# Patient Record
Sex: MALE | Race: WHITE | NOT HISPANIC OR LATINO | Employment: FULL TIME | URBAN - METROPOLITAN AREA
[De-identification: names, ages, dates, MRNs, and addresses within clinical notes are randomized per-mention and may not be internally consistent; named-entity substitution may affect disease eponyms.]

---

## 2021-05-21 ENCOUNTER — OFFICE VISIT (OUTPATIENT)
Dept: URGENT CARE | Facility: CLINIC | Age: 56
End: 2021-05-21
Payer: COMMERCIAL

## 2021-05-21 VITALS
TEMPERATURE: 99 F | BODY MASS INDEX: 24.34 KG/M2 | HEART RATE: 73 BPM | HEIGHT: 70 IN | WEIGHT: 170 LBS | OXYGEN SATURATION: 98 % | RESPIRATION RATE: 18 BRPM

## 2021-05-21 DIAGNOSIS — H18.822 CORNEAL ABRASION OF LEFT EYE DUE TO CONTACT LENS: Primary | ICD-10-CM

## 2021-05-21 PROCEDURE — 99203 OFFICE O/P NEW LOW 30 MIN: CPT | Performed by: NURSE PRACTITIONER

## 2021-05-21 RX ORDER — TOBRAMYCIN AND DEXAMETHASONE 3; 1 MG/ML; MG/ML
1 SUSPENSION/ DROPS OPHTHALMIC
Qty: 5 ML | Refills: 0 | Status: SHIPPED | OUTPATIENT
Start: 2021-05-21 | End: 2021-08-31 | Stop reason: HOSPADM

## 2021-05-21 RX ORDER — METOPROLOL SUCCINATE 50 MG/1
1 TABLET, EXTENDED RELEASE ORAL DAILY
COMMUNITY
Start: 2014-03-10 | End: 2021-08-31 | Stop reason: HOSPADM

## 2021-05-21 RX ORDER — LISINOPRIL 40 MG/1
1 TABLET ORAL DAILY
COMMUNITY
Start: 2013-05-14 | End: 2021-08-31 | Stop reason: HOSPADM

## 2021-05-21 RX ORDER — HYDROCHLOROTHIAZIDE 25 MG/1
1 TABLET ORAL DAILY
COMMUNITY
Start: 2013-05-13 | End: 2021-08-31 | Stop reason: HOSPADM

## 2021-05-21 RX ORDER — TOBRAMYCIN AND DEXAMETHASONE 3; 1 MG/ML; MG/ML
1 SUSPENSION/ DROPS OPHTHALMIC
Qty: 5 ML | Refills: 0 | Status: SHIPPED | OUTPATIENT
Start: 2021-05-21 | End: 2021-05-21

## 2021-05-21 NOTE — PATIENT INSTRUCTIONS
Use the eye drops as ordered for 7-10 days  Keep your contacts out at least the first 7 days and until your eye feels better  If the discomfort is not resolved, you should follow-up with your eye doctor  Corneal Abrasion   AMBULATORY CARE:   A corneal abrasion  is a scratch on the cornea of your eye  The cornea is the clear layer that covers the front of your eye  A small scratch may heal in 1 to 2 days  Deeper or larger scratches may take longer to heal  Do not touch or rub your eye while it is healing  Common signs and symptoms:   · Pain    · More tears than usual    · Redness    · A feeling that you have something in your eye    · Blurred vision    · Sensitivity to bright light    · Headache    Call your healthcare provider or ophthalmologist if:   · Your eye pain or vision gets worse  · You have yellow or green drainage from your eye  · You have questions or concerns about your condition or care  Treatment for a corneal abrasion  may include antibiotic eyedrops or ointment to help prevent an eye infection  You may also be given eyedrops to decrease pain  Care for your eyes:   · Get regular eye exams  Get your eyes checked at least every year  · Eat healthy foods  Fresh fruits and vegetables that are rich in vitamins A and C may help with your vision  Foods such as sweet potatoes, apricots, and carrots are rich in good nutrients for the eyes  · Take care of your contacts or glasses  Store, clean, and use your contacts or glasses as directed  Replace your glasses or contact lenses as often as your healthcare provider suggests  · Decrease eye strain  Rest your eyes, especially after you read or sew for long periods of time  Get plenty of sleep at night  Use lights that reduce glare in your home, school, or workplace  · Wear dark sunglasses  This will help prevent pain and light sensitivity  Make sure the sunglasses have UVA and UVB protection   This will protect your eyes when you go outside  · Use eyedrops safely  If your treatment plan includes eyedrops, it is important to use them as directed  Your provider may give you detailed instructions to follow  The eyedrops may also come with safety instructions  Follow all instructions to help prevent an infection  Do not touch the tip of the bottle to your eye  Germs from your eye can spread to the medicine bottle  Prevent corneal abrasions:   · Remove your contact lenses if your eyes feel dry or irritated  · Wash your hands if you need to touch your eyes or your face  · Trim your child's fingernails so he or she cannot scratch his or her eye  · Wear protective eyewear when you work with chemicals, wood, dust, or metal     · Wear protective eyewear when you play sports  · Do not wear your contacts for longer than you should  · Do not wear colored lenses or lenses with shapes on them  These lenses may cause eye damage and vision loss  · Do not wear glitter makeup  Glitter can easily get into your eyes and under contact lenses  · Do not sleep with your contacts in  Follow up with your healthcare provider as directed:  Write down your questions so you remember to ask them during your visits  © Copyright [facility] [address] Information is for End User's use only and may not be sold, redistributed or otherwise used for commercial purposes  All illustrations and images included in CareNotes® are the copyrighted property of [employer]  or [employer]  The above information is an  only  It is not intended as medical advice for individual conditions or treatments  Talk to your doctor, nurse or pharmacist before following any medical regimen to see if it is safe and effective for you

## 2021-05-23 NOTE — PROGRESS NOTES
[facility]        NAME: Shana Sebastian is a 54 y o  male  : 1965    MRN: [id number]  DATE: May 23, 2021  TIME: 3:40 PM      Assessment and Plan     Corneal abrasion of left eye due to contact lens [H18 822]  1  Corneal abrasion of left eye due to contact lens  tobramycin-dexamethasone (TOBRADEX) ophthalmic suspension    DISCONTINUED: tobramycin-dexamethasone (TOBRADEX) ophthalmic suspension         Patient Instructions     Patient Instructions     Use the eye drops as ordered for 7-10 days  Keep your contacts out at least the first 7 days and until your eye feels better  If the discomfort is not resolved, you should follow-up with your eye doctor  Corneal Abrasion   AMBULATORY CARE:   A corneal abrasion  is a scratch on the cornea of your eye  The cornea is the clear layer that covers the front of your eye  A small scratch may heal in 1 to 2 days  Deeper or larger scratches may take longer to heal  Do not touch or rub your eye while it is healing  Common signs and symptoms:   · Pain    · More tears than usual    · Redness    · A feeling that you have something in your eye    · Blurred vision    · Sensitivity to bright light    · Headache    Call your healthcare provider or ophthalmologist if:   · Your eye pain or vision gets worse  · You have yellow or green drainage from your eye  · You have questions or concerns about your condition or care  Treatment for a corneal abrasion  may include antibiotic eyedrops or ointment to help prevent an eye infection  You may also be given eyedrops to decrease pain  Care for your eyes:   · Get regular eye exams  Get your eyes checked at least every year  · Eat healthy foods  Fresh fruits and vegetables that are rich in vitamins A and C may help with your vision  Foods such as sweet potatoes, apricots, and carrots are rich in good nutrients for the eyes  · Take care of your contacts or glasses    Store, clean, and use your contacts or glasses as directed  Replace your glasses or contact lenses as often as your healthcare provider suggests  · Decrease eye strain  Rest your eyes, especially after you read or sew for long periods of time  Get plenty of sleep at night  Use lights that reduce glare in your home, school, or workplace  · Wear dark sunglasses  This will help prevent pain and light sensitivity  Make sure the sunglasses have UVA and UVB protection  This will protect your eyes when you go outside  · Use eyedrops safely  If your treatment plan includes eyedrops, it is important to use them as directed  Your provider may give you detailed instructions to follow  The eyedrops may also come with safety instructions  Follow all instructions to help prevent an infection  Do not touch the tip of the bottle to your eye  Germs from your eye can spread to the medicine bottle  Prevent corneal abrasions:   · Remove your contact lenses if your eyes feel dry or irritated  · Wash your hands if you need to touch your eyes or your face  · Trim your child's fingernails so he or she cannot scratch his or her eye  · Wear protective eyewear when you work with chemicals, wood, dust, or metal     · Wear protective eyewear when you play sports  · Do not wear your contacts for longer than you should  · Do not wear colored lenses or lenses with shapes on them  These lenses may cause eye damage and vision loss  · Do not wear glitter makeup  Glitter can easily get into your eyes and under contact lenses  · Do not sleep with your contacts in  Follow up with your healthcare provider as directed:  Write down your questions so you remember to ask them during your visits  © Copyright [address] Information is for End User's use only and may not be sold, redistributed or otherwise used for commercial purposes   All illustrations and images included in CareNotes® are the copyrighted property of [employer]  or [employer]  The above information is an  only  It is not intended as medical advice for individual conditions or treatments  Talk to your doctor, nurse or pharmacist before following any medical regimen to see if it is safe and effective for you  Follow up with PCP in 3-5 days  Proceed to  ER if symptoms worsen  Chief Complaint     Chief Complaint   Patient presents with    Eye Problem     left eye painful and swollen for 2 days, difficulty getting contact out of eye         History of Present Illness     Patient reports left eye discomfort, pain, watering, trouble removing contact lenses x 2 days  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  Nothing got in his eye that he knows of  Patient declines BP and has not taken his BP meds in several years  He refuses BP for nurse and myself  Does not have a current PCP and declines list of area PCPs  Risks of untreated hypertension discussed in depth with patient  While wearing contacts, left eye 20/25, right eye 20/25, both eyes 20/25  Review of Systems     Review of Systems   Eyes: Positive for pain and discharge (watering)  Negative for photophobia, redness, itching and visual disturbance  All other systems reviewed and are negative          Current Medications       Current Outpatient Medications:     hydrochlorothiazide (HYDRODIURIL) 25 mg tablet, Take 1 tablet by mouth daily, Disp: , Rfl:     lisinopril (ZESTRIL) 40 mg tablet, Take 1 tablet by mouth daily, Disp: , Rfl:     metoprolol succinate (TOPROL-XL) 50 mg 24 hr tablet, Take 1 tablet by mouth daily, Disp: , Rfl:     tobramycin-dexamethasone (TOBRADEX) ophthalmic suspension, Administer 1 drop into the left eye every 4 (four) hours while awake for 7 days, Disp: 5 mL, Rfl: 0    Current Allergies     Allergies as of 05/21/2021 - Reviewed 05/21/2021   Allergen Reaction Noted    Penicillins Other (See Comments) 02/20/2013              The following portions of the patient's history were reviewed and updated as appropriate: allergies, current medications, past family history, past medical history, past social history, past surgical history and problem list      History reviewed  No pertinent past medical history  History reviewed  No pertinent surgical history  History reviewed  No pertinent family history  Medications have been verified  Objective     Pulse 73   Temp 99 °F (37 2 °C) (Tympanic)   Resp 18   Ht 5' 10" (1 778 m)   Wt 77 1 kg (170 lb)   SpO2 98%   BMI 24 39 kg/m²   No LMP for male patient  Physical Exam     Physical Exam  Vitals signs and nursing note reviewed  Constitutional:       General: He is not in acute distress  Appearance: Normal appearance  He is well-developed  He is not ill-appearing, toxic-appearing or diaphoretic  HENT:      Head: Normocephalic and atraumatic  Eyes:      General: Lids are normal  Lids are everted, no foreign bodies appreciated  Vision grossly intact  Gaze aligned appropriately  No visual field deficit  Left eye: Discharge (watery, tears) present  Extraocular Movements: Extraocular movements intact  Conjunctiva/sclera:      Left eye: Left conjunctiva is injected (slight)  Chemosis present  No exudate or hemorrhage  Pupils: Pupils are equal, round, and reactive to light  Left eye: Corneal abrasion present  Neck:      Musculoskeletal: Normal range of motion and neck supple  Pulmonary:      Effort: Pulmonary effort is normal  No respiratory distress  Abdominal:      General: There is no distension  Palpations: Abdomen is soft  Musculoskeletal: Normal range of motion  Skin:     General: Skin is warm and dry  Capillary Refill: Capillary refill takes less than 2 seconds  Neurological:      General: No focal deficit present  Mental Status: He is alert and oriented to person, place, and time     Psychiatric:         Mood and Affect: Mood normal  Behavior: Behavior normal          Thought Content:  Thought content normal          Judgment: Judgment normal

## 2021-08-28 ENCOUNTER — HOSPITAL ENCOUNTER (EMERGENCY)
Facility: HOSPITAL | Age: 56
End: 2021-08-29
Attending: INTERNAL MEDICINE | Admitting: INTERNAL MEDICINE
Payer: COMMERCIAL

## 2021-08-28 DIAGNOSIS — R33.9 URINARY RETENTION: Primary | ICD-10-CM

## 2021-08-28 DIAGNOSIS — N39.0 UTI (URINARY TRACT INFECTION): ICD-10-CM

## 2021-08-28 DIAGNOSIS — R31.0 GROSS HEMATURIA: ICD-10-CM

## 2021-08-28 LAB
ALBUMIN SERPL BCP-MCNC: 4.4 G/DL (ref 3.5–5.7)
ALP SERPL-CCNC: 54 U/L (ref 40–150)
ALT SERPL W P-5'-P-CCNC: 15 U/L (ref 7–52)
ANION GAP SERPL CALCULATED.3IONS-SCNC: 9 MMOL/L (ref 4–13)
AST SERPL W P-5'-P-CCNC: 22 U/L (ref 13–39)
BACTERIA UR QL AUTO: ABNORMAL /HPF
BASOPHILS # BLD AUTO: 0.1 THOUSANDS/ΜL (ref 0–0.1)
BASOPHILS NFR BLD AUTO: 1 % (ref 0–2)
BILIRUB SERPL-MCNC: 0.5 MG/DL (ref 0.2–1)
BILIRUB UR QL STRIP: ABNORMAL
BUN SERPL-MCNC: 18 MG/DL (ref 7–25)
CALCIUM SERPL-MCNC: 8.9 MG/DL (ref 8.6–10.5)
CHLORIDE SERPL-SCNC: 102 MMOL/L (ref 98–107)
CLARITY UR: ABNORMAL
CO2 SERPL-SCNC: 25 MMOL/L (ref 21–31)
COLOR UR: ABNORMAL
CREAT SERPL-MCNC: 1.08 MG/DL (ref 0.7–1.3)
EOSINOPHIL # BLD AUTO: 0.2 THOUSAND/ΜL (ref 0–0.61)
EOSINOPHIL NFR BLD AUTO: 2 % (ref 0–5)
ERYTHROCYTE [DISTWIDTH] IN BLOOD BY AUTOMATED COUNT: 14.1 % (ref 11.5–14.5)
GFR SERPL CREATININE-BSD FRML MDRD: 77 ML/MIN/1.73SQ M
GLUCOSE SERPL-MCNC: 93 MG/DL (ref 65–99)
GLUCOSE UR STRIP-MCNC: ABNORMAL MG/DL
HCT VFR BLD AUTO: 45.4 % (ref 42–47)
HGB BLD-MCNC: 15.4 G/DL (ref 14–18)
HGB UR QL STRIP.AUTO: ABNORMAL
KETONES UR STRIP-MCNC: ABNORMAL MG/DL
LEUKOCYTE ESTERASE UR QL STRIP: ABNORMAL
LYMPHOCYTES # BLD AUTO: 2.5 THOUSANDS/ΜL (ref 0.6–4.47)
LYMPHOCYTES NFR BLD AUTO: 17 % (ref 21–51)
MCH RBC QN AUTO: 30.1 PG (ref 26–34)
MCHC RBC AUTO-ENTMCNC: 34 G/DL (ref 31–37)
MCV RBC AUTO: 89 FL (ref 81–99)
MONOCYTES # BLD AUTO: 0.9 THOUSAND/ΜL (ref 0.17–1.22)
MONOCYTES NFR BLD AUTO: 6 % (ref 2–12)
NEUTROPHILS # BLD AUTO: 11 THOUSANDS/ΜL (ref 1.4–6.5)
NEUTS SEG NFR BLD AUTO: 74 % (ref 42–75)
NITRITE UR QL STRIP: ABNORMAL
NON-SQ EPI CELLS URNS QL MICRO: ABNORMAL /HPF
OTHER STN SPEC: ABNORMAL
PH UR STRIP.AUTO: 6.5 [PH]
PLATELET # BLD AUTO: 287 THOUSANDS/UL (ref 149–390)
PMV BLD AUTO: 7.4 FL (ref 8.6–11.7)
POTASSIUM SERPL-SCNC: 3.9 MMOL/L (ref 3.5–5.5)
PROT SERPL-MCNC: 6.8 G/DL (ref 6.4–8.9)
PROT UR STRIP-MCNC: ABNORMAL MG/DL
RBC # BLD AUTO: 5.12 MILLION/UL (ref 4.3–5.9)
RBC #/AREA URNS AUTO: ABNORMAL /HPF
SODIUM SERPL-SCNC: 136 MMOL/L (ref 134–143)
SP GR UR STRIP.AUTO: 1.01 (ref 1–1.03)
UROBILINOGEN UR QL STRIP.AUTO: ABNORMAL E.U./DL
WBC # BLD AUTO: 14.9 THOUSAND/UL (ref 4.8–10.8)
WBC #/AREA URNS AUTO: ABNORMAL /HPF

## 2021-08-28 PROCEDURE — 85025 COMPLETE CBC W/AUTO DIFF WBC: CPT | Performed by: INTERNAL MEDICINE

## 2021-08-28 PROCEDURE — 99284 EMERGENCY DEPT VISIT MOD MDM: CPT

## 2021-08-28 PROCEDURE — 80053 COMPREHEN METABOLIC PANEL: CPT | Performed by: INTERNAL MEDICINE

## 2021-08-28 PROCEDURE — 99284 EMERGENCY DEPT VISIT MOD MDM: CPT | Performed by: INTERNAL MEDICINE

## 2021-08-28 PROCEDURE — 36415 COLL VENOUS BLD VENIPUNCTURE: CPT | Performed by: INTERNAL MEDICINE

## 2021-08-28 PROCEDURE — 87086 URINE CULTURE/COLONY COUNT: CPT | Performed by: INTERNAL MEDICINE

## 2021-08-28 PROCEDURE — 81001 URINALYSIS AUTO W/SCOPE: CPT | Performed by: INTERNAL MEDICINE

## 2021-08-28 RX ORDER — SULFAMETHOXAZOLE AND TRIMETHOPRIM 800; 160 MG/1; MG/1
1 TABLET ORAL 2 TIMES DAILY
Qty: 14 TABLET | Refills: 0 | Status: SHIPPED | OUTPATIENT
Start: 2021-08-28 | End: 2021-08-31 | Stop reason: HOSPADM

## 2021-08-28 RX ORDER — TAMSULOSIN HYDROCHLORIDE 0.4 MG/1
0.4 CAPSULE ORAL ONCE
Status: COMPLETED | OUTPATIENT
Start: 2021-08-28 | End: 2021-08-28

## 2021-08-28 RX ORDER — SULFAMETHOXAZOLE AND TRIMETHOPRIM 800; 160 MG/1; MG/1
1 TABLET ORAL ONCE
Status: COMPLETED | OUTPATIENT
Start: 2021-08-28 | End: 2021-08-28

## 2021-08-28 RX ORDER — TAMSULOSIN HYDROCHLORIDE 0.4 MG/1
0.4 CAPSULE ORAL
Qty: 7 CAPSULE | Refills: 0 | Status: SHIPPED | OUTPATIENT
Start: 2021-08-28 | End: 2021-08-31 | Stop reason: HOSPADM

## 2021-08-28 RX ORDER — TAMSULOSIN HYDROCHLORIDE 0.4 MG/1
0.4 CAPSULE ORAL
Qty: 7 CAPSULE | Refills: 0 | Status: SHIPPED | OUTPATIENT
Start: 2021-08-28 | End: 2021-11-04

## 2021-08-28 RX ADMIN — SULFAMETHOXAZOLE AND TRIMETHOPRIM 1 TABLET: 800; 160 TABLET ORAL at 22:35

## 2021-08-28 RX ADMIN — TAMSULOSIN HYDROCHLORIDE 0.4 MG: 0.4 CAPSULE ORAL at 22:35

## 2021-08-28 NOTE — Clinical Note
Case was discussed withPA  and the patient's admission status was agreed to be to the service of Leslee Pagan consulted

## 2021-08-29 ENCOUNTER — HOSPITAL ENCOUNTER (INPATIENT)
Facility: HOSPITAL | Age: 56
LOS: 2 days | Discharge: HOME HEALTH | DRG: 669 | End: 2021-08-31
Attending: INTERNAL MEDICINE | Admitting: INTERNAL MEDICINE
Payer: COMMERCIAL

## 2021-08-29 ENCOUNTER — APPOINTMENT (INPATIENT)
Dept: RADIOLOGY | Facility: HOSPITAL | Age: 56
DRG: 669 | End: 2021-08-29
Payer: COMMERCIAL

## 2021-08-29 VITALS
OXYGEN SATURATION: 98 % | RESPIRATION RATE: 17 BRPM | HEART RATE: 65 BPM | SYSTOLIC BLOOD PRESSURE: 150 MMHG | TEMPERATURE: 97.8 F | DIASTOLIC BLOOD PRESSURE: 66 MMHG

## 2021-08-29 DIAGNOSIS — D49.4 BLADDER TUMOR: ICD-10-CM

## 2021-08-29 DIAGNOSIS — C67.0 MALIGNANT NEOPLASM OF TRIGONE OF URINARY BLADDER (HCC): ICD-10-CM

## 2021-08-29 DIAGNOSIS — R33.8 ACUTE URINARY RETENTION: Primary | ICD-10-CM

## 2021-08-29 PROBLEM — R31.0 GROSS HEMATURIA: Status: ACTIVE | Noted: 2021-08-29

## 2021-08-29 PROBLEM — R03.0 ELEVATED BLOOD PRESSURE READING: Status: ACTIVE | Noted: 2021-08-29

## 2021-08-29 PROBLEM — IMO0001 SMOKING: Status: ACTIVE | Noted: 2021-08-29

## 2021-08-29 PROBLEM — R00.0 TACHYCARDIA: Status: ACTIVE | Noted: 2021-08-29

## 2021-08-29 PROBLEM — R65.10 SIRS OF NON-INFECTIOUS ORIGIN W/O ACUTE ORGAN DYSFUNCTION (HCC): Status: ACTIVE | Noted: 2021-08-29

## 2021-08-29 PROBLEM — F17.200 SMOKING: Status: ACTIVE | Noted: 2021-08-29

## 2021-08-29 PROBLEM — N39.0 URINARY TRACT INFECTION: Status: ACTIVE | Noted: 2021-08-29

## 2021-08-29 PROBLEM — I16.0 HYPERTENSIVE URGENCY: Status: ACTIVE | Noted: 2021-08-29

## 2021-08-29 LAB
ALBUMIN SERPL BCP-MCNC: 3.8 G/DL (ref 3.5–5.7)
ALP SERPL-CCNC: 47 U/L (ref 40–150)
ALT SERPL W P-5'-P-CCNC: 14 U/L (ref 7–52)
ANION GAP SERPL CALCULATED.3IONS-SCNC: 7 MMOL/L (ref 4–13)
AST SERPL W P-5'-P-CCNC: 18 U/L (ref 13–39)
BILIRUB SERPL-MCNC: 0.4 MG/DL (ref 0.2–1)
BUN SERPL-MCNC: 25 MG/DL (ref 7–25)
CALCIUM SERPL-MCNC: 8.2 MG/DL (ref 8.6–10.5)
CHLORIDE SERPL-SCNC: 104 MMOL/L (ref 98–107)
CO2 SERPL-SCNC: 23 MMOL/L (ref 21–31)
CREAT SERPL-MCNC: 0.88 MG/DL (ref 0.7–1.3)
ERYTHROCYTE [DISTWIDTH] IN BLOOD BY AUTOMATED COUNT: 14.5 % (ref 11.5–14.5)
GFR SERPL CREATININE-BSD FRML MDRD: 97 ML/MIN/1.73SQ M
GLUCOSE SERPL-MCNC: 98 MG/DL (ref 65–99)
HCT VFR BLD AUTO: 41.5 % (ref 42–47)
HCT VFR BLD AUTO: 44.3 % (ref 36.5–49.3)
HGB BLD-MCNC: 13.9 G/DL (ref 14–18)
HGB BLD-MCNC: 15.2 G/DL (ref 12–17)
MCH RBC QN AUTO: 29.9 PG (ref 26–34)
MCHC RBC AUTO-ENTMCNC: 33.6 G/DL (ref 31–37)
MCV RBC AUTO: 89 FL (ref 81–99)
PLATELET # BLD AUTO: 222 THOUSANDS/UL (ref 149–390)
PMV BLD AUTO: 7.3 FL (ref 8.6–11.7)
POTASSIUM SERPL-SCNC: 3.8 MMOL/L (ref 3.5–5.5)
PROT SERPL-MCNC: 6.3 G/DL (ref 6.4–8.9)
RBC # BLD AUTO: 4.65 MILLION/UL (ref 4.3–5.9)
SODIUM SERPL-SCNC: 134 MMOL/L (ref 134–143)
WBC # BLD AUTO: 12.6 THOUSAND/UL (ref 4.8–10.8)

## 2021-08-29 PROCEDURE — 36415 COLL VENOUS BLD VENIPUNCTURE: CPT | Performed by: NURSE PRACTITIONER

## 2021-08-29 PROCEDURE — 74178 CT ABD&PLV WO CNTR FLWD CNTR: CPT

## 2021-08-29 PROCEDURE — 51702 INSERT TEMP BLADDER CATH: CPT | Performed by: PHYSICIAN ASSISTANT

## 2021-08-29 PROCEDURE — 0TBB8ZZ EXCISION OF BLADDER, VIA NATURAL OR ARTIFICIAL OPENING ENDOSCOPIC: ICD-10-PCS | Performed by: UROLOGY

## 2021-08-29 PROCEDURE — 99254 IP/OBS CNSLTJ NEW/EST MOD 60: CPT | Performed by: UROLOGY

## 2021-08-29 PROCEDURE — G0379 DIRECT REFER HOSPITAL OBSERV: HCPCS

## 2021-08-29 PROCEDURE — G1004 CDSM NDSC: HCPCS

## 2021-08-29 PROCEDURE — 80053 COMPREHEN METABOLIC PANEL: CPT | Performed by: NURSE PRACTITIONER

## 2021-08-29 PROCEDURE — 0T2BX0Z CHANGE DRAINAGE DEVICE IN BLADDER, EXTERNAL APPROACH: ICD-10-PCS | Performed by: UROLOGY

## 2021-08-29 PROCEDURE — 85027 COMPLETE CBC AUTOMATED: CPT | Performed by: NURSE PRACTITIONER

## 2021-08-29 PROCEDURE — 85014 HEMATOCRIT: CPT | Performed by: STUDENT IN AN ORGANIZED HEALTH CARE EDUCATION/TRAINING PROGRAM

## 2021-08-29 PROCEDURE — 85018 HEMOGLOBIN: CPT | Performed by: STUDENT IN AN ORGANIZED HEALTH CARE EDUCATION/TRAINING PROGRAM

## 2021-08-29 PROCEDURE — 0TJB8ZZ INSPECTION OF BLADDER, VIA NATURAL OR ARTIFICIAL OPENING ENDOSCOPIC: ICD-10-PCS | Performed by: UROLOGY

## 2021-08-29 RX ORDER — NICOTINE 21 MG/24HR
21 PATCH, TRANSDERMAL 24 HOURS TRANSDERMAL DAILY
Status: DISCONTINUED | OUTPATIENT
Start: 2021-08-29 | End: 2021-08-31 | Stop reason: HOSPADM

## 2021-08-29 RX ORDER — NICOTINE 21 MG/24HR
21 PATCH, TRANSDERMAL 24 HOURS TRANSDERMAL ONCE
Status: DISCONTINUED | OUTPATIENT
Start: 2021-08-29 | End: 2021-08-29 | Stop reason: HOSPADM

## 2021-08-29 RX ORDER — LISINOPRIL 20 MG/1
40 TABLET ORAL DAILY
Status: DISCONTINUED | OUTPATIENT
Start: 2021-08-29 | End: 2021-08-29 | Stop reason: HOSPADM

## 2021-08-29 RX ORDER — TAMSULOSIN HYDROCHLORIDE 0.4 MG/1
0.4 CAPSULE ORAL
Status: DISCONTINUED | OUTPATIENT
Start: 2021-08-29 | End: 2021-08-31 | Stop reason: HOSPADM

## 2021-08-29 RX ORDER — HYDROCHLOROTHIAZIDE 25 MG/1
25 TABLET ORAL DAILY
Status: DISCONTINUED | OUTPATIENT
Start: 2021-08-29 | End: 2021-08-29 | Stop reason: HOSPADM

## 2021-08-29 RX ORDER — HYDROMORPHONE HCL/PF 1 MG/ML
1 SYRINGE (ML) INJECTION ONCE
Status: COMPLETED | OUTPATIENT
Start: 2021-08-29 | End: 2021-08-29

## 2021-08-29 RX ORDER — OXYCODONE HYDROCHLORIDE 5 MG/1
5 TABLET ORAL EVERY 6 HOURS PRN
Status: DISCONTINUED | OUTPATIENT
Start: 2021-08-29 | End: 2021-08-29

## 2021-08-29 RX ORDER — TOBRAMYCIN AND DEXAMETHASONE 3; 1 MG/ML; MG/ML
1 SUSPENSION/ DROPS OPHTHALMIC
Status: DISCONTINUED | OUTPATIENT
Start: 2021-08-29 | End: 2021-08-29 | Stop reason: HOSPADM

## 2021-08-29 RX ORDER — OXYCODONE HYDROCHLORIDE 10 MG/1
10 TABLET ORAL EVERY 6 HOURS PRN
Status: DISCONTINUED | OUTPATIENT
Start: 2021-08-29 | End: 2021-08-31 | Stop reason: HOSPADM

## 2021-08-29 RX ORDER — LIDOCAINE HYDROCHLORIDE 20 MG/ML
1 JELLY TOPICAL ONCE
Status: COMPLETED | OUTPATIENT
Start: 2021-08-29 | End: 2021-08-29

## 2021-08-29 RX ORDER — OXYCODONE HYDROCHLORIDE 5 MG/1
5 TABLET ORAL EVERY 6 HOURS PRN
Status: DISCONTINUED | OUTPATIENT
Start: 2021-08-29 | End: 2021-08-31 | Stop reason: HOSPADM

## 2021-08-29 RX ORDER — METOPROLOL SUCCINATE 50 MG/1
50 TABLET, EXTENDED RELEASE ORAL DAILY
Status: DISCONTINUED | OUTPATIENT
Start: 2021-08-29 | End: 2021-08-29 | Stop reason: HOSPADM

## 2021-08-29 RX ORDER — METOPROLOL SUCCINATE 50 MG/1
50 TABLET, EXTENDED RELEASE ORAL DAILY
Status: DISCONTINUED | OUTPATIENT
Start: 2021-08-29 | End: 2021-08-29

## 2021-08-29 RX ADMIN — LIDOCAINE HYDROCHLORIDE 1 APPLICATION: 20 JELLY TOPICAL at 18:49

## 2021-08-29 RX ADMIN — TAMSULOSIN HYDROCHLORIDE 0.4 MG: 0.4 CAPSULE ORAL at 16:53

## 2021-08-29 RX ADMIN — CEFTRIAXONE 1000 MG: 1 INJECTION, POWDER, FOR SOLUTION INTRAMUSCULAR; INTRAVENOUS at 13:23

## 2021-08-29 RX ADMIN — NICOTINE 21 MG: 21 PATCH, EXTENDED RELEASE TRANSDERMAL at 00:48

## 2021-08-29 RX ADMIN — OXYCODONE HYDROCHLORIDE 10 MG: 10 TABLET ORAL at 18:47

## 2021-08-29 RX ADMIN — HYDROMORPHONE HYDROCHLORIDE 1 MG: 1 INJECTION, SOLUTION INTRAMUSCULAR; INTRAVENOUS; SUBCUTANEOUS at 02:53

## 2021-08-29 RX ADMIN — IOHEXOL 100 ML: 350 INJECTION, SOLUTION INTRAVENOUS at 11:13

## 2021-08-29 RX ADMIN — NICOTINE 21 MG: 21 PATCH, EXTENDED RELEASE TRANSDERMAL at 09:00

## 2021-08-29 NOTE — ASSESSMENT & PLAN NOTE
· Present on admission as evidence by systolic blood pressure of 199  · Denies an prior history however notably takes antihypertensives   · Continue PTA medication regimen   · Likely elevated in the ED secondary to acute pain in the setting of urinary retention  · Ensure adequate pain control  · Monitor with routine vitals for now

## 2021-08-29 NOTE — ASSESSMENT & PLAN NOTE
Patient has blood pressures in the 190s over 100s consultation, pulse in the 100s  Based on patient's home medications, he has history of hypertension  Plan  Pain control with Dilaudid  P r n   Labetalol and hydralazine

## 2021-08-29 NOTE — PROCEDURES
Called to exchange three way moreland that had clotted off and was not able to run CBI despite manual irrigation  Pt asked for pain medication prior to procedure  Removed previous catheter and placed urojet to numb area  Using standard sterile technique, placed 3 way moreland, urine returned back and balloon inflated with 30 ml of sterile water  Irrigated moreland and removed several large blood clots, irrigated moreland until urine pink tinged and then CBI restarted  Pt tolerated procedure but had some pain   Pt for surgery in am

## 2021-08-29 NOTE — CONSULTS
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, enhancing and suspicious for TCC  [place] Heavy tobacco abuse history  Urine does not appear infected  He is on Rocephin  Plan:   TURBT tomorrow- I told pt this is most likely bladder CA, and why the operation is needed, how it is done and the risks of bleeding, infection, need for additonal procedures, damage to bladder, possible need for moreland afterwards    History of Present Illness   Attending: Mariely Love MD  Reason for Consult / Principal Problem:  Gross hematuria, urinary retention  HPI: Hung Bro is a 54y o  year old male transferred from [facility] yesterday evening-he presented in the evening complaining of urinary retention  He said he had not been able urinate since 3:00 a m  In the afternoon  He had several beers yesterday and when he tried to urinate only some blood would,  He has noticed blood in his urine intermittently for the past 3 years and he had some gross hematuria yesterday  Has not been to a doctor in years  No known urologic history  He smokes 2 packs cigarettes per day, and drinks a 12 pack on weekends  He has hypertension  He has been afebrile but hypertensive  Slightly tachycardic with heart rate 109 on presentation to the emergency department  Urinalysis showed innumerable red blood cells, 10-20 white blood cells per high-powered field  No bacteria seen  Creatinine normal at 1 0 rate  White blood count elevated 14,900, platelets [id number], hemoglobin normal at 15 4 grams/deciliter  He had Moreland catheter placed in the emergency department there and had gross hematuria which was clotting off the catheter    He has had no imaging up until now- I ordered a CT scan and it shows normal kidneys, no lymphadenopathy, but an enhancing 5 4cm bladder mass right posterior wall, suspicious for cancer- I personally reviewed the films         Inpatient consult to Urology  Consult performed by: Adriano Dunn MD  Consult ordered by: Juju Dudley DO          Review of Systems   Genitourinary: Positive for difficulty urinating and hematuria  Historical Information   No past medical history on file  No past surgical history on file  Social History   Social History     Substance and Sexual Activity   Alcohol Use Yes     @DRUGHX  E-Cigarette/Vaping     E-Cigarette/Vaping Substances     Social History     Tobacco Use   Smoking Status Never Smoker   Smokeless Tobacco Never Used     Family History: non-contributory    Meds/Allergies   all current active meds have been reviewed, current meds:   Current Facility-Administered Medications   Medication Dose Route Frequency    cefTRIAXone (ROCEPHIN) 1,000 mg in dextrose 5 % 50 mL IVPB  1,000 mg Intravenous Q24H    nicotine (NICODERM CQ) 21 mg/24 hr TD 24 hr patch 21 mg  21 mg Transdermal Daily    oxyCODONE (ROXICODONE) immediate release tablet 10 mg  10 mg Oral Q6H PRN    oxyCODONE (ROXICODONE) IR tablet 5 mg  5 mg Oral Q6H PRN    tamsulosin (FLOMAX) capsule 0 4 mg  0 4 mg Oral Daily With Dinner    and PTA meds:   Prior to Admission Medications   Prescriptions Last Dose Informant Patient Reported?  Taking?   hydrochlorothiazide (HYDRODIURIL) 25 mg tablet   Yes No   Sig: Take 1 tablet by mouth daily   lisinopril (ZESTRIL) 40 mg tablet   Yes No   Sig: Take 1 tablet by mouth daily   metoprolol succinate (TOPROL-XL) 50 mg 24 hr tablet   Yes No   Sig: Take 1 tablet by mouth daily   sulfamethoxazole-trimethoprim (BACTRIM DS) 800-160 mg per tablet   No No   Sig: Take 1 tablet by mouth 2 (two) times a day for 7 days smx-tmp DS (BACTRIM) 800-160 mg tabs (1tab q12 D10)   sulfamethoxazole-trimethoprim (BACTRIM DS) 800-160 mg per tablet   No No   Sig: Take 1 tablet by mouth 2 (two) times a day for 7 days smx-tmp DS (BACTRIM) 800-160 mg tabs (1tab q12 D10)   tamsulosin (FLOMAX) 0 4 mg   No No   Sig: Take 1 capsule (0 4 mg total) by mouth daily with dinner   tamsulosin (FLOMAX) 0 4 mg   No No   Sig: Take 1 capsule (0 4 mg total) by mouth daily with dinner   tobramycin-dexamethasone (TOBRADEX) ophthalmic suspension   No No   Sig: Administer 1 drop into the left eye every 4 (four) hours while awake for 7 days      Facility-Administered Medications: None     Allergies   Allergen Reactions    Penicillins Other (See Comments)       Objective   Vitals: Blood pressure 138/79, pulse 73, temperature 98 2 °F (36 8 °C), temperature source Oral, resp  rate 16, height 5' 10" (1 778 m), weight 76 7 kg (169 lb 1 5 oz), SpO2 97 %  No intake/output data recorded  Invasive Devices     Peripheral Intravenous Line            Peripheral IV 08/28/21 Left Antecubital <1 day          Drain            Urethral Catheter Three way 24 Fr  <1 day                Physical Exam  Vitals reviewed  Constitutional:       Appearance: Normal appearance  He is normal weight  HENT:      Head: Normocephalic and atraumatic  Eyes:      Extraocular Movements: Extraocular movements intact  Pulmonary:      Effort: Pulmonary effort is normal    Musculoskeletal:         General: Normal range of motion  Cervical back: Normal range of motion  Skin:     Coloration: Skin is not jaundiced or pale  Neurological:      General: No focal deficit present  Mental Status: He is alert and oriented to person, place, and time  Psychiatric:         Mood and Affect: Mood normal          Behavior: Behavior normal          Thought Content:  Thought content normal          Judgment: Judgment normal          Lab Results:   CBC:   Lab Results   Component Value Date    WBC 12 60 (H) 08/29/2021    HGB 13 9 (L) 08/29/2021    HCT 41 5 (L) 08/29/2021    MCV 89 08/29/2021     08/29/2021    MCH 29 9 08/29/2021    MCHC 33 6 08/29/2021    RDW 14 5 08/29/2021    MPV 7 3 (L) 08/29/2021     CMP:   Lab Results   Component Value Date    SODIUM 134 08/29/2021     08/29/2021    CO2 23 08/29/2021    BUN 25 08/29/2021    CREATININE 0 88 08/29/2021    CALCIUM 8 2 (L) 08/29/2021    AST 18 08/29/2021    ALT 14 08/29/2021    ALKPHOS 47 08/29/2021    EGFR 97 08/29/2021     Urinalysis:   Lab Results   Component Value Date    COLORU Red (A) 08/28/2021    CLARITYU Cloudy (A) 08/28/2021    SPECGRAV 1 010 08/28/2021    PHUR 6 5 08/28/2021    LEUKOCYTESUR 2+ (A) 08/28/2021    NITRITE Interference- unable to analyze 08/28/2021    GLUCOSEU Trace (A) 08/28/2021    KETONESU 15 (1+) (A) 08/28/2021    BILIRUBINUR Interference- unable to analyze (A) 08/28/2021    BLOODU 3+ (A) 08/28/2021     Urine Culture: No results found for: URINECX  Imaging Studies: Nothing available yet  EKG, Pathology, and Other Studies: Nothing pertinent  VTE Prophylaxis: Sequential compression device (Venodyne)     Code Status: Level 1 - Full Code  Advance Directive and Living Will:      Power of :    POLST:      ounseling / Coordination of Care  Total floor / unit time spent today 30 minutes  Greater than 50% of total time was spent with the patient and / or family counseling and / or coordination of care  A description of the counseling / coordination of care: reviewing CT, deciding on treatment

## 2021-08-29 NOTE — ASSESSMENT & PLAN NOTE
Patient is complaining of urinary urgency and retention  Tachycardic in 100s on presentation  No fever or tachypnea  Innumerable rbc's and 10-20 wbc's on urine microscopy   Leukocytosis of 14 9    Plan  Follow up urine cultures  Continue Empiric IV Ceftriaxone  See assessment and plan for urinary retention

## 2021-08-29 NOTE — ASSESSMENT & PLAN NOTE
Patient reports having intermittent gross hematuria over the past 3 years  Patient producing bright red urine today, clotted off Pedro catheter  Patient has extensive smoking history  Hemoglobin 15 2       Plan  See assessment plan for urinary retention

## 2021-08-29 NOTE — H&P
INTERNAL MEDICINE RESIDENCY ADMISSION H&P     Name: Antonio Marks   Age & Sex: 54 y o  male   MRN: [id number]  Unit/Bed#: [facility] 307-01   Encounter: [id number]  Primary Care Provider: No primary care provider on file  Code Status: Level 1 - Full Code  Admission Status: INPATIENT    Disposition: Patient requires Med/Surg    Admit to team: SOD Team B     ASSESSMENT/PLAN     Principal Problem:    Acute urinary retention  Active Problems:    Urinary tract infection    Gross hematuria    Smoking      Smoking  Assessment & Plan  Patient smokes 2 packs per day  Continue 21 mg nicotine patch    Gross hematuria  Assessment & Plan  Patient reports having intermittent gross hematuria over the past 3 years  Patient producing bright red urine today, clotted off Pedro catheter  Patient has extensive smoking history  Hemoglobin 15 2  Plan  Urology consult  Follow-up CT scan  Pedro catheterization and bladder irrigation  See assessment plan for urinary retention    Urinary tract infection  Assessment & Plan  Patient is complaining of urinary urgency and retention  Tachycardic in 100s on presentation  No fever or tachypnea  Innumerable rbc's and 10-20 wbc's on urine microscopy  Leukocytosis of 14 9  Given this patient's pyuria and leukocytosis, urinary tract infection is likely  Plan  Urine culture  Patient given a dose of Bactrim in the ED  Empiric IV Ceftriaxone  See assessment and plan for urinary retention    Hypertensive urgency  Assessment & Plan  Patient has blood pressures in the 190s over 100s consultation, pulse in the 100s  Based on patient's home medications, he has history of hypertension  Plan  Pain control with Dilaudid  P r n  Labetalol and hydralazine  Continue home HCTZ, lisinopril, and metoprolol    * Acute urinary retention  Assessment & Plan  43-year-old male with history of hypertension complaining of urinary retention starting 8/28 afternoon    Patient has been experiencing strong urge to urinate but only puts out a small amount of urine  Patient spontaneously urinating bright red urine today during attempts to place Pedro catheter  Pedro catheter clotted off by bloody urine  Patient has had intermittent hematuria over the past 3 years, extensive smoking history  In the ED, Patient is afebrile, tachycardic in the 100s with blood pressure in the 190s over 100s  No fevers, chills, rigors, CVA tenderness  U/A: Innumerable RBCs on urine microscopy, 10-20 WBC  Leukocytosis of 14 9  Hemoglobin 15 2    Plan  · Urology consult, appreciate recommendations   · Follow-up CT scan  · NPO prior to possible urologic intervention  · Bladder scan  · Flomax  · Pedro catheterization and bladder irrigation      VTE Pharmacologic Prophylaxis: Sequential compression device (Venodyne) , gross hematuria holding pharmacologic prophylaxis at this time  VTE Mechanical Prophylaxis: sequential compression device    CHIEF COMPLAINT   No chief complaint on file  HISTORY OF PRESENT ILLNESS     This is a 80-year-old male with history of hypertension and smoking 2 packs day complaining of urinary retention that started on the afternoon of 8/28  Reports experiencing strong urge to urinate, but when he attempts to urinate only a small amount of blood comes out  He has noted intermittent bloody urine over the past 3 years, and he has not seen a doctor in several years  He denies any fevers, chills, CVA tenderness, or rigors  With worsening painful urinary retention, he presented to [address] [place], [place] [address]Th [facility] ED  In the ED he was found to be tachycardic in the 100s and hypertensive in the 190s over 100s  Urine microscopy demonstrates innumerable RBCs and 10-20 wbc's  His WBC is 12 6; HGB 15 2  He is afebrile  He was given a dose of Bactrim in the ED  While attempting Pedro catheter placement, the patient was noted to have spontaneous bright red urine  After Pedro catheter was placed, was clotted off then replaced  He was given Dilaudid for pain, and given his home antihypertensives:  Metoprolol, lisinopril, and hydrochlorothiazide  He was transferred to [facility] for urology consultation  REVIEW OF SYSTEMS       Review of Systems   Constitutional: Negative for chills and fever  HENT: Negative for ear pain and sore throat  Eyes: Negative for pain and visual disturbance  Respiratory: Negative for cough and shortness of breath  Cardiovascular: Negative for chest pain and palpitations  Gastrointestinal: Negative for abdominal pain and vomiting  Genitourinary: Positive for difficulty urinating, hematuria and urgency  Negative for dysuria and flank pain  Musculoskeletal: Negative for arthralgias and back pain  Skin: Negative for color change and rash  Neurological: Negative for seizures and syncope  All other systems reviewed and are negative  OBJECTIVE     Vitals:    21 0700 21 0853   BP: 152/69 138/79   BP Location: Right arm Left arm   Pulse: 96 73   Resp: 16    Temp: 98 2 °F (36 8 °C)    TempSrc: Oral    SpO2: 97%    Weight: 76 7 kg (169 lb 1 5 oz)    Height: 5' 10" (1 778 m)       Temperature:   Temp (24hrs), Av °F (36 7 °C), Min:97 8 °F (36 6 °C), Max:98 2 °F (36 8 °C)    Temperature: 98 2 °F (36 8 °C)  Intake & Output:  I/O     None        Weights:   IBW (Ideal Body Weight): 73 kg    Body mass index is 24 26 kg/m²  Weight (last 2 days)     Date/Time   Weight    21 0700   76 7 (169 09)              Physical Exam  Vitals and nursing note reviewed  Constitutional:       Appearance: He is well-developed  Comments: Patient is visibly uncomfortable   HENT:      Head: Normocephalic and atraumatic  Nose: Nose normal       Mouth/Throat:      Mouth: Mucous membranes are moist       Pharynx: Oropharynx is clear  Eyes:      Extraocular Movements: Extraocular movements intact        Conjunctiva/sclera: Conjunctivae normal       Pupils: Pupils are equal, round, and reactive to light  Cardiovascular:      Rate and Rhythm: Normal rate and regular rhythm  Pulses: Normal pulses  Heart sounds: Normal heart sounds  No murmur heard  Pulmonary:      Effort: Pulmonary effort is normal  No respiratory distress  Breath sounds: Normal breath sounds  Abdominal:      Palpations: Abdomen is soft  Tenderness: There is no abdominal tenderness  There is no right CVA tenderness or left CVA tenderness  Genitourinary:     Comments: Pedro catheter in place  Musculoskeletal:      Cervical back: Neck supple  Right lower leg: No edema  Left lower leg: No edema  Skin:     General: Skin is warm and dry  Capillary Refill: Capillary refill takes less than 2 seconds  Neurological:      General: No focal deficit present  Mental Status: He is alert and oriented to person, place, and time  Psychiatric:         Mood and Affect: Mood normal          Behavior: Behavior normal          Thought Content: Thought content normal       Comments: Patient is visibly anxious       PAST MEDICAL HISTORY   No past medical history on file  PAST SURGICAL HISTORY   No past surgical history on file  SOCIAL & FAMILY HISTORY     Social History     Substance and Sexual Activity   Alcohol Use Yes     Substance and Sexual Activity   Alcohol Use Yes        Substance and Sexual Activity   Drug Use Not Currently     Social History     Tobacco Use   Smoking Status Never Smoker   Smokeless Tobacco Never Used     No family history on file  LABORATORY DATA     Labs: I have personally reviewed pertinent reports      Results from last 7 days   Lab Units 08/29/21  0915 08/29/21  0450 08/28/21  2112   WBC Thousand/uL  --  12 60* 14 90*   HEMOGLOBIN g/dL 15 2 13 9* 15 4   HEMATOCRIT % 44 3 41 5* 45 4   PLATELETS Thousands/uL  --  222 287   NEUTROS PCT %  --   --  74   MONOS PCT %  --   --  6      Results from last 7 days   Lab Units 08/29/21  0450 08/28/21  2112   POTASSIUM mmol/L 3 8 3 9   CHLORIDE mmol/L 104 102   CO2 mmol/L 23 25   BUN mg/dL 25 18   CREATININE mg/dL 0 88 1 08   CALCIUM mg/dL 8 2* 8 9   ALK PHOS U/L 47 54   ALT U/L 14 15   AST U/L 18 22                          Micro:  No results found for: BLOODCX, URINECX, WOUNDCULT, SPUTUMCULTUR  IMAGING & DIAGNOSTIC TESTS     Imaging: I have personally reviewed pertinent reports  No results found  EKG, Pathology, and Other Studies: I have personally reviewed pertinent reports  ALLERGIES     Allergies   Allergen Reactions    Penicillins Other (See Comments)     MEDICATIONS PRIOR TO ARRIVAL     Prior to Admission medications    Medication Sig Start Date End Date Taking?  Authorizing Provider   hydrochlorothiazide (HYDRODIURIL) 25 mg tablet Take 1 tablet by mouth daily 5/13/13   Historical Provider, MD   lisinopril (ZESTRIL) 40 mg tablet Take 1 tablet by mouth daily 5/14/13   Historical Provider, MD   metoprolol succinate (TOPROL-XL) 50 mg 24 hr tablet Take 1 tablet by mouth daily 3/10/14   Historical Provider, MD   sulfamethoxazole-trimethoprim (BACTRIM DS) 800-160 mg per tablet Take 1 tablet by mouth 2 (two) times a day for 7 days smx-tmp DS (BACTRIM) 800-160 mg tabs (1tab q12 D10) 8/28/21 9/4/21  Jesus Rao MD   sulfamethoxazole-trimethoprim (BACTRIM DS) 800-160 mg per tablet Take 1 tablet by mouth 2 (two) times a day for 7 days smx-tmp DS (BACTRIM) 800-160 mg tabs (1tab q12 D10) 8/28/21 9/4/21  Jesus Rao MD   tamsulosin [facility]) 0 4 mg Take 1 capsule (0 4 mg total) by mouth daily with dinner 8/28/21   Jesus Rao MD   tamsulosin [facility]) 0 4 mg Take 1 capsule (0 4 mg total) by mouth daily with dinner 8/28/21   Jesus Rao MD   tobramycin-dexamethasone [facility]KA) ophthalmic suspension Administer 1 drop into the left eye every 4 (four) hours while awake for 7 days 5/21/21 5/28/21  MARY Ham     MEDICATIONS ADMINISTERED IN LAST 24 HOURS     Medication Administration - last 24 hours from 08/28/2021 1103 to 08/29/2021 1103       Date/Time Order Dose Route Action Action by     08/29/2021 0900 nicotine (NICODERM CQ) 21 mg/24 hr TD 24 hr patch 21 mg 21 mg Transdermal Medication Applied Per Casas RN        CURRENT MEDICATIONS     Current Facility-Administered Medications   Medication Dose Route Frequency Provider Last Rate    cefTRIAXone  1,000 mg Intravenous Q24H Ned Wil, DO      nicotine  21 mg Transdermal Daily Lawrence Vollaro, DO      oxyCODONE  10 mg Oral Q6H PRN Ned Wil, DO      oxyCODONE  5 mg Oral Q6H PRN Gardner Wil, DO      tamsulosin  0 4 mg Oral Daily With 5 [address]              Admission Time  I spent 45 admitting the patient  This involved direct patient contact where I performed a full history and physical, reviewing previous records, and reviewing laboratory and other diagnostic studies  Portions of the record may have been created with voice recognition software  Occasional wrong word or "sound a like" substitutions may have occurred due to the inherent limitations of voice recognition software    Read the chart carefully and recognize, using context, where substitutions have occurred     ==  Cynthia Babin MD  [address]  Internal Medicine Residency PGY-1

## 2021-08-29 NOTE — ASSESSMENT & PLAN NOTE
· Present on admission as evidence by tachycardia and leukocytosis  · Tachycardia resolved status post catheterization  · Leukocytosis possibly reactive  · Will hold off on antibiotic therapy at this time  · UA with leukocytes however primarily with red blood cells and no bacteria seen

## 2021-08-29 NOTE — DISCHARGE INSTRUCTIONS
Recommended drinking plenty of water  Cranberry juice to sit if I the urine  Following up with Urology on Monday  Also following up with his wife's PCP who was at [facility]

## 2021-08-29 NOTE — EMTALA/ACUTE CARE TRANSFER
[facility]  [address] [id number] [phone]  Dept: [phone]      EMTALA TRANSFER CONSENT    NAME Ina Mackay                                         1965                              MRN [id number]    I have been informed of my rights regarding examination, treatment, and transfer   by Dr Chelly Valdivia MD    Benefits: Specialized equipment and/or services available at the receiving facility (Include comment)________________________    Risks: Potential for delay in receiving treatment      Consent for Transfer:  I acknowledge that my medical condition has been evaluated and explained to me by the emergency department physician or other qualified medical person and/or my attending physician, who has recommended that I be transferred to the service of  Accepting Physician: Supa Kevin at [address] Name, [address] : [facility]   The above potential benefits of such transfer, the potential risks associated with such transfer, and the probable risks of not being transferred have been explained to me, and I fully understand them  The doctor has explained that, in my case, the benefits of transfer outweigh the risks  I agree to be transferred  I authorize the performance of emergency medical procedures and treatments upon me in both transit and upon arrival at the receiving facility  Additionally, I authorize the release of any and all medical records to the receiving facility and request they be transported with me, if possible  I understand that the safest mode of transportation during a medical emergency is an ambulance and that the Hospital advocates the use of this mode of transport  Risks of traveling to the receiving facility by car, including absence of medical control, life sustaining equipment, such as oxygen, and medical personnel has been explained to me and I fully understand them      ([place] BELOW)  [  ]  I consent to the stated transfer and to be transported by ambulance/helicopter  [  ]  I consent to the stated transfer, but refuse transportation by ambulance and accept full responsibility for my transportation by car  I understand the risks of non-ambulance transfers and I exonerate the Hospital and its staff from any deterioration in my condition that results from this refusal     X___________________________________________    DATE  21  TIME________  Signature of patient or legally responsible individual signing on patient behalf           RELATIONSHIP TO PATIENT_________________________          Provider Certification    NAME Jorge Alberto Christian                                         1965                              MRN [id number]    A medical screening exam was performed on the above named patient  Based on the examination:    Condition Necessitating Transfer The primary encounter diagnosis was Urinary retention  Diagnoses of Gross hematuria and UTI (urinary tract infection) were also pertinent to this visit      Patient Condition: The patient has been stabilized such that within reasonable medical probability, no material deterioration of the patient condition or the condition of the unborn child(dahlia) is likely to result from the transfer    Reason for Transfer: Level of Care needed not available at this facility (Urology )    Transfer Requirements: [facility] [address]    · Space available and qualified personnel available for treatment as acknowledged by    · Agreed to accept transfer and to provide appropriate medical treatment as acknowledged by       Jeff  · Appropriate medical records of the examination and treatment of the patient are provided at the time of transfer   [address], [address] _______  · Transfer will be performed by qualified personnel from [facility]  and appropriate transfer equipment as required, including the use of necessary and appropriate life support measures  Provider Certification: I have examined the patient and explained the following risks and benefits of being transferred/refusing transfer to the patient/family:  General risk, such as traffic hazards, adverse weather conditions, rough terrain or turbulence, possible failure of equipment (including vehicle or aircraft), or consequences of actions of persons outside the control of the transport personnel      Based on these reasonable risks and benefits to the patient and/or the unborn child(dahlia), and based upon the information available at the time of the patients examination, I certify that the medical benefits reasonably to be expected from the provision of appropriate medical treatments at another medical facility outweigh the increasing risks, if any, to the individuals medical condition, and in the case of labor to the unborn child, from effecting the transfer      X____________________________________________ DATE 08/29/21        TIME_______      ORIGINAL - SEND TO MEDICAL RECORDS   COPY - SEND WITH PATIENT DURING TRANSFER

## 2021-08-29 NOTE — ED NOTES
While attempting to place the [place] pt began to spontaniously urinate bright red urine  No clots were noted in the urine  Provider informed  Updated post void bladder scan charted        [facility]  08/28/21 [id number]

## 2021-08-29 NOTE — ED PROVIDER NOTES
History  Chief Complaint   Patient presents with    Urinary Retention     Patient reports trouble urinating since 3pm today  Patient reports blood in his urine on and off for the last 3 years  63-year-old male accompanied by his wife presents emergency room with chief complaint of urinary retention     The patient states he has not been able urinate since 3-330 this afternoon   The patient states he has had several beers today feels he has a tremendous urge to urinate when he does try to urinate only some blood presents  This is not the 1st occurrence for this patient for this issue however he has never sought medical attention for this patient has noted blood in his urine intermittently for the past 3 years roughly  He has not been to a doctor in years  As far as he knows he has no medical problems  He is a smoker he smokes about 2 packs cigarettes per day, and drinks a 12 pack on the weekends but denies alcohol use during the week  Denies any fever chills rigors CVA tenderness  He has had no chest pain chest pressure shortness of breath lightheadedness dizziness  Prior to Admission Medications   Prescriptions Last Dose Informant Patient Reported? Taking?   hydrochlorothiazide (HYDRODIURIL) 25 mg tablet   Yes No   Sig: Take 1 tablet by mouth daily   lisinopril (ZESTRIL) 40 mg tablet   Yes No   Sig: Take 1 tablet by mouth daily   metoprolol succinate (TOPROL-XL) 50 mg 24 hr tablet   Yes No   Sig: Take 1 tablet by mouth daily   tobramycin-dexamethasone (TOBRADEX) ophthalmic suspension   No No   Sig: Administer 1 drop into the left eye every 4 (four) hours while awake for 7 days      Facility-Administered Medications: None       History reviewed  No pertinent past medical history  History reviewed  No pertinent surgical history  History reviewed  No pertinent family history  I have reviewed and agree with the history as documented      E-Cigarette/Vaping     E-Cigarette/Vaping Substances Social History     Tobacco Use    Smoking status: Never Smoker    Smokeless tobacco: Never Used   Substance Use Topics    Alcohol use: Yes    Drug use: Not Currently       Review of Systems   Constitutional: Negative  Respiratory: Negative  Cardiovascular: Negative  Gastrointestinal: Negative  Genitourinary: Positive for decreased urine volume, difficulty urinating and hematuria  Musculoskeletal: Negative  Skin: Negative  Neurological: Negative  Hematological: Negative  Psychiatric/Behavioral: The patient is nervous/anxious  Physical Exam  Physical Exam  Vitals and nursing note reviewed  Exam conducted with a chaperone present  Constitutional:       General: He is not in acute distress  Appearance: Normal appearance  He is not ill-appearing, toxic-appearing or diaphoretic  Comments: Patient non distressed beers obviously uncomfortable   HENT:      Head: Normocephalic and atraumatic  Eyes:      Extraocular Movements: Extraocular movements intact  Cardiovascular:      Rate and Rhythm: Normal rate and regular rhythm  Pulses: Normal pulses  Heart sounds: Normal heart sounds  Pulmonary:      Effort: Pulmonary effort is normal       Breath sounds: Normal breath sounds  Abdominal:      General: Abdomen is flat  Bowel sounds are normal       Palpations: Abdomen is soft  Genitourinary:     Penis: Normal        Testes: Normal    Musculoskeletal:         General: Normal range of motion  Cervical back: Normal range of motion and neck supple  Skin:     General: Skin is warm and dry  Capillary Refill: Capillary refill takes less than 2 seconds  Neurological:      General: No focal deficit present  Mental Status: He is alert and oriented to person, place, and time  Psychiatric:         Mood and Affect: Mood normal          Thought Content:  Thought content normal          Judgment: Judgment normal          Vital Signs  ED Triage Vitals [08/28/21 2047]   Temperature Pulse Respirations Blood Pressure SpO2   97 8 °F (36 6 °C) (!) 109 18 (!) 199/103 98 %      Temp Source Heart Rate Source Patient Position - Orthostatic VS BP Location FiO2 (%)   Temporal -- Lying Left arm --      Pain Score       --           Vitals:    08/28/21 2047   BP: (!) 199/103   Pulse: (!) 109   Patient Position - Orthostatic VS: Lying         Visual Acuity      ED Medications  Medications   sulfamethoxazole-trimethoprim (BACTRIM DS) 800-160 mg per tablet 1 tablet (1 tablet Oral Given 8/28/21 2235)   tamsulosin (FLOMAX) capsule 0 4 mg (0 4 mg Oral Given 8/28/21 2235)       Diagnostic Studies  Results Reviewed     Procedure Component Value Units Date/Time    Urine Microscopic [[id number]]  (Abnormal) Collected: 08/28/21 2147    Lab Status: Final result Specimen: Urine, Clean Catch Updated: 08/28/21 2213     RBC, UA Innumerable /hpf      WBC, UA 10-20 /hpf      Epithelial Cells Occasional /hpf      Bacteria, UA None Seen /hpf      OTHER OBSERVATIONS WBCs Clumped    Urine culture [[id number]] Collected: 08/28/21 2147    Lab Status:  In process Specimen: Urine, Clean Catch Updated: 08/28/21 2212    UA w Reflex to Microscopic w Reflex to Culture [[id number]]  (Abnormal) Collected: 08/28/21 2147    Lab Status: Final result Specimen: Urine, Clean Catch Updated: 08/28/21 2206     Color, UA Red     Clarity, UA Cloudy     Specific Buckner, UA 1 010     pH, UA 6 5     Leukocytes, UA 2+     Nitrite, UA Interference- unable to analyze     Protein, UA       Interference- unable to analyze     mg/dl     Glucose, UA Trace mg/dl      Ketones, UA 15 (1+) mg/dl      Urobilinogen, UA       Interference-unable to analyze     E U /dl     Bilirubin, UA Interference- unable to analyze     Blood, UA 3+    Comprehensive metabolic panel [[id number]] Collected: 08/28/21 2112    Lab Status: Final result Specimen: Blood from Arm, Left Updated: 08/28/21 2138     Sodium 136 mmol/L      Potassium 3 9 mmol/L Chloride 102 mmol/L      CO2 25 mmol/L      ANION GAP 9 mmol/L      BUN 18 mg/dL      Creatinine 1 08 mg/dL      Glucose 93 mg/dL      Calcium 8 9 mg/dL      AST 22 U/L      ALT 15 U/L      Alkaline Phosphatase 54 U/L      Total Protein 6 8 g/dL      Albumin 4 4 g/dL      Total Bilirubin 0 50 mg/dL      eGFR 77 ml/min/1 73sq m     Narrative:      National Kidney Disease Foundation guidelines for Chronic Kidney Disease (CKD):     Stage 1 with normal or high GFR (GFR > 90 mL/min/1 73 square meters)    Stage 2 Mild CKD (GFR = 60-89 mL/min/1 73 square meters)    Stage 3A Moderate CKD (GFR = 45-59 mL/min/1 73 square meters)    Stage 3B Moderate CKD (GFR = 30-44 mL/min/1 73 square meters)    Stage 4 Severe CKD (GFR = 15-29 mL/min/1 73 square meters)    Stage 5 End Stage CKD (GFR <15 mL/min/1 73 square meters)  Note: GFR calculation is accurate only with a steady state creatinine    CBC and differential [[id number]]  (Abnormal) Collected: 08/28/21 2112    Lab Status: Final result Specimen: Blood from Arm, Left Updated: 08/28/21 2125     WBC 14 90 Thousand/uL      RBC 5 12 Million/uL      Hemoglobin 15 4 g/dL      Hematocrit 45 4 %      MCV 89 fL      MCH 30 1 pg      MCHC 34 0 g/dL      RDW 14 1 %      MPV 7 4 fL      Platelets 599 Thousands/uL      Neutrophils Relative 74 %      Lymphocytes Relative 17 %      Monocytes Relative 6 %      Eosinophils Relative 2 %      Basophils Relative 1 %      Neutrophils Absolute 11 00 Thousands/µL      Lymphocytes Absolute 2 50 Thousands/µL      Monocytes Absolute 0 90 Thousand/µL      Eosinophils Absolute 0 20 Thousand/µL      Basophils Absolute 0 10 Thousands/µL                  No orders to display              Procedures  Procedures         ED Course                             SBIRT 22yo+      Most Recent Value   SBIRT (22 yo +)   In order to provide better care to our patients, we are screening all of our patients for alcohol and drug use   Would it be okay to ask you these screening questions? Yes Filed at: 08/28/2021 2117   Initial Alcohol Screen: US AUDIT-C    1  How often do you have a drink containing alcohol?  0 Filed at: 08/28/2021 2117   2  How many drinks containing alcohol do you have on a typical day you are drinking? 0 Filed at: 08/28/2021 2117   3a  Male UNDER 65: How often do you have five or more drinks on one occasion? 0 Filed at: 08/28/2021 2117   3b  FEMALE Any Age, or MALE 65+: How often do you have 4 or more drinks on one occassion? 0 Filed at: 08/28/2021 2117   Audit-C Score  0 Filed at: 08/28/2021 2117   HOANG: How many times in the past year have you    Used an illegal drug or used a prescription medication for non-medical reasons? Never Filed at: 08/28/2021 2117                    MDM  Number of Diagnoses or Management Options  Gross hematuria: established and worsening  Urinary retention: established and worsening  UTI (urinary tract infection): new and requires workup  Diagnosis management comments: Patient originally refused Pedro catheter  Was being discharged for follow-up as an outpatient however I a request that the patient wait and see if he could urinate before went home and drink some water  After about 45 minutes he cannot drink watery agree to Pedro catheterization  He had gross hematuria which was clotting off the Pedro catheter had to be replaced  I discussed the case with Dr Yasmine Pagan and patient to be admitted and Dr Yasmine Pagan on constant consult        Disposition  Final diagnoses:   Urinary retention   Gross hematuria   UTI (urinary tract infection)     Time reflects when diagnosis was documented in both MDM as applicable and the Disposition within this note     Time User Action Codes Description Comment    8/28/2021 10:57 PM Cam Lizet Add [R33 9] Urinary retention     8/28/2021 10:57 PM Cam Lizet Add [R31 0] Gross hematuria     8/28/2021 10:57 PM Cam Lizet Add [N39 0] UTI (urinary tract infection)       ED Disposition ED Disposition Condition Date/Time Comment    Discharge Stable Sat Aug 28, 2021 10:57 PM Miguel Romero discharge to home/self care  Follow-up Information     Follow up With Specialties Details Why Jorge Sauceda MD Urology In 2 days  [address] [place] [address]  [phone]            Patient's Medications   Discharge Prescriptions    SULFAMETHOXAZOLE-TRIMETHOPRIM (BACTRIM DS) 800-160 MG PER TABLET    Take 1 tablet by mouth 2 (two) times a day for 7 days smx-tmp DS (BACTRIM) 800-160 mg tabs (1tab q12 D10)       Start Date: 8/28/2021 End Date: 9/4/2021       Order Dose: 1 tablet       Quantity: 14 tablet    Refills: 0    SULFAMETHOXAZOLE-TRIMETHOPRIM (BACTRIM DS) 800-160 MG PER TABLET    Take 1 tablet by mouth 2 (two) times a day for 7 days smx-tmp DS (BACTRIM) 800-160 mg tabs (1tab q12 D10)       Start Date: 8/28/2021 End Date: 9/4/2021       Order Dose: 1 tablet       Quantity: 14 tablet    Refills: 0    TAMSULOSIN (FLOMAX) 0 4 MG    Take 1 capsule (0 4 mg total) by mouth daily with dinner       Start Date: 8/28/2021 End Date: --       Order Dose: 0 4 mg       Quantity: 7 capsule    Refills: 0    TAMSULOSIN (FLOMAX) 0 4 MG    Take 1 capsule (0 4 mg total) by mouth daily with dinner       Start Date: 8/28/2021 End Date: --       Order Dose: 0 4 mg       Quantity: 7 capsule    Refills: 0     No discharge procedures on file      PDMP Review     None          ED Provider  Electronically Signed by           David Hsu MD  08/28/21 2592       David Hsu MD  08/29/21 2262

## 2021-08-29 NOTE — PROGRESS NOTES
INTERNAL MEDICINE RESIDENCY SENIOR ADMISSION NOTE     Name: Jossie Proctor   Age & Sex: 54 y o  male   MRN: [id number]  Unit/Bed#: [facility] 307-01   Encounter: [id number]  Primary Care Provider: No primary care provider on file  Admit to team: SOD Team B     Patient seen and examined  Reviewed H&P per Dr Felisha Cabello   Agree with the assessment and plan with any exception/addition as noted below:    Principal Problem:    Acute urinary retention  Active Problems:    Urinary tract infection    Gross hematuria    Smoking    Patient presented to [facility] with gross hematuria, acute urinary retention  CT imaging shows clot retention due to 5 4 cm right posterior wall bladder tumor, suspicious for bladder cancer  OR tomorrow with urology, NPO midnight, continue Pedro catheterization with CBI  Vital stable      Code Status: Level 1 - Full Code  Admission Status: INPATIENT   Disposition: Patient requires Med/Surg  Expected Length of Stay:  Greater than [facility] [place] [address] Internal Medicine PGY-2

## 2021-08-29 NOTE — ASSESSMENT & PLAN NOTE
Background:  Presented to the ED with inability to urinate  Did require Pedro catheter placement in the emergency department with significant amount of hematuria  He did report history of hematuria over the past 3 years however    · Likely in the setting of BPH  · ED staff spoke to [address] [place] [address] who will evaluate patient on 08/29 formally  · CBI in the meantime with 3 way Pedro placed  · Flomax ordered  · Monitor hemoglobin serially  · Intake and output  · Urology consulted

## 2021-08-29 NOTE — ED NOTES
[facility] P3 room 307  [id number] with Kathy Elias  Accepting Dr Dillan Lo  Report to [id number]  08/29/21 6706

## 2021-08-29 NOTE — ASSESSMENT & PLAN NOTE
80-year-old male with history of hypertension complaining of urinary retention starting 8/28 afternoon  Patient had been experiencing strong urge to urinate but only putting out a small amount of urine  Pedro catheter was placed and has been draining pink urine  Catheter has had to be removed, flushed, and replaced 3x due to clotting  CT scan found 5 4cm right posterior wall bladder tumor, enhancing and suspicious for TCC     TURBT POD #1    Plan  · Continue Flomax  · Continue Pedro catheterization and bladder irrigation  · Appreciate consult from urology

## 2021-08-29 NOTE — ASSESSMENT & PLAN NOTE
· Present on admission as evidence by heart rate of 109  · Likely secondary to acute pain in the setting of urinary retention  · Resolved with heart rate of 87 following catheterization  · Monitor with routine vitals

## 2021-08-30 ENCOUNTER — ANESTHESIA EVENT (INPATIENT)
Dept: PERIOP | Facility: HOSPITAL | Age: 56
DRG: 669 | End: 2021-08-30
Payer: COMMERCIAL

## 2021-08-30 ENCOUNTER — ANESTHESIA (INPATIENT)
Dept: PERIOP | Facility: HOSPITAL | Age: 56
DRG: 669 | End: 2021-08-30
Payer: COMMERCIAL

## 2021-08-30 PROBLEM — N32.89 BLADDER MASS: Status: ACTIVE | Noted: 2021-08-30

## 2021-08-30 LAB
BACTERIA UR CULT: NORMAL
ERYTHROCYTE [DISTWIDTH] IN BLOOD BY AUTOMATED COUNT: 13.9 % (ref 11.6–15.1)
HCT VFR BLD AUTO: 42.5 % (ref 36.5–49.3)
HGB BLD-MCNC: 14.4 G/DL (ref 12–17)
MCH RBC QN AUTO: 30.7 PG (ref 26.8–34.3)
MCHC RBC AUTO-ENTMCNC: 33.9 G/DL (ref 31.4–37.4)
MCV RBC AUTO: 91 FL (ref 82–98)
PLATELET # BLD AUTO: 209 THOUSANDS/UL (ref 149–390)
PMV BLD AUTO: 9.3 FL (ref 8.9–12.7)
RBC # BLD AUTO: 4.69 MILLION/UL (ref 3.88–5.62)
WBC # BLD AUTO: 13.55 THOUSAND/UL (ref 4.31–10.16)

## 2021-08-30 PROCEDURE — 88307 TISSUE EXAM BY PATHOLOGIST: CPT | Performed by: PATHOLOGY

## 2021-08-30 PROCEDURE — 85027 COMPLETE CBC AUTOMATED: CPT | Performed by: UROLOGY

## 2021-08-30 PROCEDURE — 52240 CYSTOSCOPY AND TREATMENT: CPT | Performed by: UROLOGY

## 2021-08-30 RX ORDER — PROMETHAZINE HYDROCHLORIDE 25 MG/ML
25 INJECTION, SOLUTION INTRAMUSCULAR; INTRAVENOUS ONCE AS NEEDED
Status: DISCONTINUED | OUTPATIENT
Start: 2021-08-30 | End: 2021-08-30 | Stop reason: HOSPADM

## 2021-08-30 RX ORDER — MIDAZOLAM HYDROCHLORIDE 2 MG/2ML
INJECTION, SOLUTION INTRAMUSCULAR; INTRAVENOUS AS NEEDED
Status: DISCONTINUED | OUTPATIENT
Start: 2021-08-30 | End: 2021-08-30

## 2021-08-30 RX ORDER — ONDANSETRON 2 MG/ML
INJECTION INTRAMUSCULAR; INTRAVENOUS AS NEEDED
Status: DISCONTINUED | OUTPATIENT
Start: 2021-08-30 | End: 2021-08-30

## 2021-08-30 RX ORDER — PHENAZOPYRIDINE HYDROCHLORIDE 100 MG/1
200 TABLET, FILM COATED ORAL EVERY 8 HOURS PRN
Status: DISCONTINUED | OUTPATIENT
Start: 2021-08-30 | End: 2021-08-31 | Stop reason: HOSPADM

## 2021-08-30 RX ORDER — SODIUM CHLORIDE, SODIUM LACTATE, POTASSIUM CHLORIDE, CALCIUM CHLORIDE 600; 310; 30; 20 MG/100ML; MG/100ML; MG/100ML; MG/100ML
INJECTION, SOLUTION INTRAVENOUS CONTINUOUS PRN
Status: DISCONTINUED | OUTPATIENT
Start: 2021-08-30 | End: 2021-08-30

## 2021-08-30 RX ORDER — MAGNESIUM HYDROXIDE 1200 MG/15ML
LIQUID ORAL AS NEEDED
Status: DISCONTINUED | OUTPATIENT
Start: 2021-08-30 | End: 2021-08-30 | Stop reason: HOSPADM

## 2021-08-30 RX ORDER — MAGNESIUM HYDROXIDE 1200 MG/15ML
3000 LIQUID ORAL CONTINUOUS
Status: DISCONTINUED | OUTPATIENT
Start: 2021-08-30 | End: 2021-08-31 | Stop reason: HOSPADM

## 2021-08-30 RX ORDER — GLYCINE 1.5 G/100ML
SOLUTION IRRIGATION AS NEEDED
Status: DISCONTINUED | OUTPATIENT
Start: 2021-08-30 | End: 2021-08-30 | Stop reason: HOSPADM

## 2021-08-30 RX ORDER — OXYBUTYNIN CHLORIDE 5 MG/1
5 TABLET ORAL 3 TIMES DAILY PRN
Status: DISCONTINUED | OUTPATIENT
Start: 2021-08-30 | End: 2021-08-31 | Stop reason: HOSPADM

## 2021-08-30 RX ORDER — DEXAMETHASONE SODIUM PHOSPHATE 10 MG/ML
INJECTION, SOLUTION INTRAMUSCULAR; INTRAVENOUS AS NEEDED
Status: DISCONTINUED | OUTPATIENT
Start: 2021-08-30 | End: 2021-08-30

## 2021-08-30 RX ORDER — KETAMINE HCL IN NACL, ISO-OSM 100MG/10ML
SYRINGE (ML) INJECTION AS NEEDED
Status: DISCONTINUED | OUTPATIENT
Start: 2021-08-30 | End: 2021-08-30

## 2021-08-30 RX ORDER — GLYCOPYRROLATE 0.2 MG/ML
INJECTION INTRAMUSCULAR; INTRAVENOUS AS NEEDED
Status: DISCONTINUED | OUTPATIENT
Start: 2021-08-30 | End: 2021-08-30

## 2021-08-30 RX ORDER — ALBUTEROL SULFATE 2.5 MG/3ML
2.5 SOLUTION RESPIRATORY (INHALATION) ONCE AS NEEDED
Status: DISCONTINUED | OUTPATIENT
Start: 2021-08-30 | End: 2021-08-30 | Stop reason: HOSPADM

## 2021-08-30 RX ORDER — NEOSTIGMINE METHYLSULFATE 1 MG/ML
INJECTION INTRAVENOUS AS NEEDED
Status: DISCONTINUED | OUTPATIENT
Start: 2021-08-30 | End: 2021-08-30

## 2021-08-30 RX ORDER — PROPOFOL 10 MG/ML
INJECTION, EMULSION INTRAVENOUS AS NEEDED
Status: DISCONTINUED | OUTPATIENT
Start: 2021-08-30 | End: 2021-08-30

## 2021-08-30 RX ORDER — SODIUM CHLORIDE, SODIUM LACTATE, POTASSIUM CHLORIDE, CALCIUM CHLORIDE 600; 310; 30; 20 MG/100ML; MG/100ML; MG/100ML; MG/100ML
50 INJECTION, SOLUTION INTRAVENOUS CONTINUOUS
Status: DISCONTINUED | OUTPATIENT
Start: 2021-08-30 | End: 2021-08-31 | Stop reason: HOSPADM

## 2021-08-30 RX ORDER — CEFTRIAXONE 1 G/1
INJECTION, POWDER, FOR SOLUTION INTRAMUSCULAR; INTRAVENOUS AS NEEDED
Status: DISCONTINUED | OUTPATIENT
Start: 2021-08-30 | End: 2021-08-30

## 2021-08-30 RX ORDER — FENTANYL CITRATE 50 UG/ML
INJECTION, SOLUTION INTRAMUSCULAR; INTRAVENOUS AS NEEDED
Status: DISCONTINUED | OUTPATIENT
Start: 2021-08-30 | End: 2021-08-30

## 2021-08-30 RX ORDER — HYDROMORPHONE HCL/PF 1 MG/ML
0.5 SYRINGE (ML) INJECTION
Status: DISCONTINUED | OUTPATIENT
Start: 2021-08-30 | End: 2021-08-30 | Stop reason: HOSPADM

## 2021-08-30 RX ORDER — ROCURONIUM BROMIDE 10 MG/ML
INJECTION, SOLUTION INTRAVENOUS AS NEEDED
Status: DISCONTINUED | OUTPATIENT
Start: 2021-08-30 | End: 2021-08-30

## 2021-08-30 RX ADMIN — DEXAMETHASONE SODIUM PHOSPHATE 5 MG: 10 INJECTION, SOLUTION INTRAMUSCULAR; INTRAVENOUS at 10:25

## 2021-08-30 RX ADMIN — FENTANYL CITRATE 25 MCG: 50 INJECTION INTRAMUSCULAR; INTRAVENOUS at 10:58

## 2021-08-30 RX ADMIN — NICOTINE 21 MG: 21 PATCH, EXTENDED RELEASE TRANSDERMAL at 13:10

## 2021-08-30 RX ADMIN — SODIUM CHLORIDE, SODIUM LACTATE, POTASSIUM CHLORIDE, AND CALCIUM CHLORIDE: .6; .31; .03; .02 INJECTION, SOLUTION INTRAVENOUS at 10:19

## 2021-08-30 RX ADMIN — ROCURONIUM BROMIDE 15 MG: 50 INJECTION, SOLUTION INTRAVENOUS at 11:04

## 2021-08-30 RX ADMIN — Medication 20 MG: at 10:40

## 2021-08-30 RX ADMIN — GLYCOPYRROLATE 0.8 MG: 0.2 INJECTION, SOLUTION INTRAMUSCULAR; INTRAVENOUS at 11:29

## 2021-08-30 RX ADMIN — SODIUM CHLORIDE FOR IRRIGATION 3000 ML: 0.9 SOLUTION IRRIGATION at 13:11

## 2021-08-30 RX ADMIN — CEFTRIAXONE 1000 MG: 1 INJECTION, POWDER, FOR SOLUTION INTRAMUSCULAR; INTRAVENOUS at 10:28

## 2021-08-30 RX ADMIN — FENTANYL CITRATE 50 MCG: 50 INJECTION INTRAMUSCULAR; INTRAVENOUS at 10:24

## 2021-08-30 RX ADMIN — PROPOFOL 50 MG: 10 INJECTION, EMULSION INTRAVENOUS at 10:26

## 2021-08-30 RX ADMIN — ONDANSETRON 4 MG: 2 INJECTION INTRAMUSCULAR; INTRAVENOUS at 11:29

## 2021-08-30 RX ADMIN — PROPOFOL 100 MG: 10 INJECTION, EMULSION INTRAVENOUS at 10:25

## 2021-08-30 RX ADMIN — PROPOFOL 50 MG: 10 INJECTION, EMULSION INTRAVENOUS at 10:27

## 2021-08-30 RX ADMIN — HYDROMORPHONE HYDROCHLORIDE 0.5 MG: 1 INJECTION, SOLUTION INTRAMUSCULAR; INTRAVENOUS; SUBCUTANEOUS at 12:11

## 2021-08-30 RX ADMIN — FENTANYL CITRATE 25 MCG: 50 INJECTION INTRAMUSCULAR; INTRAVENOUS at 10:55

## 2021-08-30 RX ADMIN — MIDAZOLAM 2 MG: 1 INJECTION INTRAMUSCULAR; INTRAVENOUS at 10:19

## 2021-08-30 RX ADMIN — CEFTRIAXONE 1000 MG: 1 INJECTION, POWDER, FOR SOLUTION INTRAMUSCULAR; INTRAVENOUS at 13:16

## 2021-08-30 RX ADMIN — NEOSTIGMINE METHYLSULFATE 4 MG: 1 INJECTION INTRAVENOUS at 11:29

## 2021-08-30 RX ADMIN — TAMSULOSIN HYDROCHLORIDE 0.4 MG: 0.4 CAPSULE ORAL at 17:14

## 2021-08-30 RX ADMIN — SODIUM CHLORIDE, SODIUM LACTATE, POTASSIUM CHLORIDE, AND CALCIUM CHLORIDE 50 ML/HR: .6; .31; .03; .02 INJECTION, SOLUTION INTRAVENOUS at 17:15

## 2021-08-30 NOTE — ANESTHESIA POSTPROCEDURE EVALUATION
Post-Op Assessment Note    CV Status:  Stable    Pain management: adequate  Multimodal analgesia used between 6 hours prior to anesthesia start to PACU discharge    Mental Status:  Alert and awake   Hydration Status:  Euvolemic   PONV Controlled:  Controlled   Airway Patency:  Patent      Post Op Vitals Reviewed: Yes            There were no known complications for this encounter      BP   148/78   Temp   36 3C   Pulse  79   Resp   14   SpO2   96% RA

## 2021-08-30 NOTE — CASE MANAGEMENT
CM met with the Pt, explained CM program/CM role  LOS- 1 day   Bundle- No    Unplanned readmission color- (8, green low)  30 Day readmit- No      Pt lives with his wife who is a CNA in a 1STH with 3STE to enter  Pt's bedroom is located on the 1st level  Pt has no assigned caregiver in the home  Pt is independent  Pt drives  Pt does not have PCP at this time, and declined [employer] card when offered  Pt's pharmacy provider is [facility]/Phone: [phone] Fax: [phone]  Pt is able to afford all medications  Pt has no hx of HHC or IP rehab at [facility]  Pt does not use any DME  Pt has no MH issues or D&A  Pt is employed  Pt's Medical POA is his wife, Nunu Michelle [phone] (H)  Pt has no LW completed at this time  Pt's wife will transport via car at DC  DC plan is tbd- pending therapy recs  CM will continue to follow the Pt until medically cleared for DC  CM reviewed d/c planning process including the following: identifying help at home, patient preference for d/c planning needs, Discharge Lounge, [facility] Meds to Bed program, availability of treatment team to discuss questions or concerns patient and/or family may have regarding understanding medications and recognizing signs and symptoms once discharged  CM also encouraged patient to follow up with all recommended appointments after discharge  Patient advised of importance for patient and family to participate in managing patients medical well being

## 2021-08-30 NOTE — QUICK NOTE
Very large bladder tumor resected from trigone and inferior posterior wall  Also had a fairly sizable tumor of the anterior wall that was resected  All in all about 8-9 cm of tumor  There was perforation of the right lateral trigone  He has a 25 [employer] hematuria catheter to CBI  He will need to keep the catheter in for at least a week  Further treatment based on pathology report

## 2021-08-30 NOTE — QUICK NOTE
In the recovery room, I noticed an enlargement of the patient's right medial clavicle near the manubrium  I palpated this and there is a firm mass there  The patient says it has not been there for years but maybe in the past couple of months    For this reason I have ordered a CT scan of the chest with contrast

## 2021-08-30 NOTE — ASSESSMENT & PLAN NOTE
S/p TURBT w/ urology on 8/30/2021   · 8-9 cm of mass was removed  There was perforation of the right lateral trigone  · Pedro catheter will need to stay in place for at least 1 week per urology  · Pending biopsy results  · Will follow up with urology outpatient

## 2021-08-30 NOTE — PROGRESS NOTES
INTERNAL MEDICINE RESIDENCY PROGRESS NOTE     Name: Eva Ling   Age & Sex: 54 y o  male   MRN: [id number]  Unit/Bed#: OR POOL   Encounter: [id number]  Team: SOD Team B     PATIENT INFORMATION     Name: Eva Ling   Age & Sex: 54 y o  male   MRN: [id number]  Hospital Stay Days: 1    ASSESSMENT/PLAN     Principal Problem:    Acute urinary retention  Active Problems:    Urinary tract infection    Gross hematuria    Smoking    Bladder mass      Bladder mass  Assessment & Plan  · S/p TURBT w/ urology on 8/30/2021   · 8-9 cm of mass was removed  There was perforation of the right lateral trigone  · Pedro catheter will need to stay in place for at least 1 week per urology  · Pending biopsy results  · Will follow up with urology outpatient  Smoking  Assessment & Plan  Patient smokes 2 packs per day  Continue 21 mg nicotine patch    Gross hematuria  Assessment & Plan  Patient reports having intermittent gross hematuria over the past 3 years  Patient producing bright red urine today, clotted off Pedro catheter  Patient has extensive smoking history  Hemoglobin 15 2  Plan  See assessment plan for urinary retention    Urinary tract infection  Assessment & Plan  Patient is complaining of urinary urgency and retention  Tachycardic in 100s on presentation  No fever or tachypnea  Innumerable rbc's and 10-20 wbc's on urine microscopy  Leukocytosis of 14 9    Plan  Follow up urine cultures  Continue Empiric IV Ceftriaxone  See assessment and plan for urinary retention    Hypertensive urgency  Assessment & Plan  Patient has blood pressures in the 190s over 100s consultation, pulse in the 100s  Based on patient's home medications, he has history of hypertension  Plan  Pain control with Dilaudid  P r n   Labetalol and hydralazine  Continue home HCTZ, lisinopril, and metoprolol    * Acute urinary retention  Assessment & Plan  68-year-old male with history of hypertension complaining of urinary retention starting  afternoon  Patient had been experiencing strong urge to urinate but only putting out a small amount of urine  Pedro catheter was placed and has been draining pink urine  Catheter has had to be removed, flushed, and replaced 3x due to clotting  CT scan found 5 4cm right posterior wall bladder tumor, enhancing and suspicious for TCC  Plan  · TURBT POD #0  · Continue Flomax  · Continue Pedro catheterization and bladder irrigation      Disposition: Inpatient pending above; possible D/C tomorrow    SUBJECTIVE     Patient seen and examined  No acute events overnight  Patient continues to have hematuria  He has no complaints otherwise  12 point system ROS reviewed and negative unless stated otherwise above    OBJECTIVE     Vitals:    21 1509 21 2000 21 2347 21 0700   BP: 142/67  134/63 155/73   BP Location: Left arm   Left arm   Pulse: 62  66 60   Resp: 16  18 18   Temp: 98 2 °F (36 8 °C)  98 3 °F (36 8 °C) 98 °F (36 7 °C)   TempSrc: Oral   Oral   SpO2: 98% 98% 98% 95%   Weight:       Height:          Temperature:   Temp (24hrs), Av 2 °F (36 8 °C), Min:98 °F (36 7 °C), Max:98 3 °F (36 8 °C)    Temperature: 98 °F (36 7 °C)  Intake & Output:  I/O       701 -  07 -  -  07    P  O   118     IV Piggyback  50     Total Intake(mL/kg)  168 (2 2)     Urine (mL/kg/hr)   1200 (3 3)    Total Output   1200    Net  -188 -1200               Weights:   IBW (Ideal Body Weight): 73 kg    Body mass index is 24 26 kg/m²  Weight (last 2 days)     Date/Time   Weight    21 0700   76 7 (169 09)            Physical Exam  Constitutional:       Appearance: Normal appearance  HENT:      Head: Normocephalic and atraumatic  Nose: Nose normal       Mouth/Throat:      Mouth: Mucous membranes are moist       Pharynx: Oropharynx is clear  Eyes:      Extraocular Movements: Extraocular movements intact        Conjunctiva/sclera: Conjunctivae normal    Cardiovascular:      Rate and Rhythm: Normal rate and regular rhythm  Heart sounds: No murmur heard  No friction rub  No gallop  Pulmonary:      Effort: Pulmonary effort is normal  No respiratory distress  Breath sounds: Normal breath sounds  No stridor  No wheezing, rhonchi or rales  Chest:      Chest wall: No tenderness  Abdominal:      General: Bowel sounds are normal  There is no distension  Palpations: Abdomen is soft  There is no mass  Tenderness: There is no abdominal tenderness  There is no guarding or rebound  Hernia: No hernia is present  Musculoskeletal:         General: Normal range of motion  Cervical back: Normal range of motion  Skin:     General: Skin is warm and dry  Neurological:      General: No focal deficit present  Mental Status: He is alert and oriented to person, place, and time  Psychiatric:         Mood and Affect: Mood normal        LABORATORY DATA     Labs: I have personally reviewed pertinent reports  Results from last 7 days   Lab Units 08/29/21  0915 08/29/21  0450 08/28/21  2112   WBC Thousand/uL  --  12 60* 14 90*   HEMOGLOBIN g/dL 15 2 13 9* 15 4   HEMATOCRIT % 44 3 41 5* 45 4   PLATELETS Thousands/uL  --  222 287   NEUTROS PCT %  --   --  74   MONOS PCT %  --   --  6      Results from last 7 days   Lab Units 08/29/21  0450 08/28/21  2112   POTASSIUM mmol/L 3 8 3 9   CHLORIDE mmol/L 104 102   CO2 mmol/L 23 25   BUN mg/dL 25 18   CREATININE mg/dL 0 88 1 08   CALCIUM mg/dL 8 2* 8 9   ALK PHOS U/L 47 54   ALT U/L 14 15   AST U/L 18 22                            IMAGING & DIAGNOSTIC TESTING     Radiology Results: I have personally reviewed pertinent reports    CT abdomen pelvis w wo contrast    Result Date: 8/29/2021  Impression: No solid renal mass No evidence of ureteric or urinary tract obstruction 5 4 cm filling defect noted within the urinary bladder with suggestion of enhancement suspicious for a bladder mass, correlation with cystoscopy suggested No pelvic sidewall lymphadenopathy Significant finding notification has been created Workstation performed: [id number]     Other Diagnostic Testing: I have personally reviewed pertinent reports  ACTIVE MEDICATIONS     Current Facility-Administered Medications   Medication Dose Route Frequency    albuterol inhalation solution 2 5 mg  2 5 mg Nebulization Once PRN    cefTRIAXone (ROCEPHIN) 1,000 mg in dextrose 5 % 50 mL IVPB  1,000 mg Intravenous Q24H    HYDROmorphone (DILAUDID) injection 0 5 mg  0 5 mg Intravenous Q10 Min PRN    ipratropium (ATROVENT) 0 02 % inhalation solution 0 5 mg  0 5 mg Nebulization Once PRN    lactated ringers infusion  50 mL/hr Intravenous Continuous    nicotine (NICODERM CQ) 21 mg/24 hr TD 24 hr patch 21 mg  21 mg Transdermal Daily    oxybutynin (DITROPAN) tablet 5 mg  5 mg Oral TID PRN    oxyCODONE (ROXICODONE) immediate release tablet 10 mg  10 mg Oral Q6H PRN    oxyCODONE (ROXICODONE) IR tablet 5 mg  5 mg Oral Q6H PRN    phenazopyridine (PYRIDIUM) tablet 200 mg  200 mg Oral Q8H PRN    promethazine (PHENERGAN) injection 25 mg  25 mg Intravenous Once PRN    tamsulosin (FLOMAX) capsule 0 4 mg  0 4 mg Oral Daily With Dinner       Portions of the record may have been created with voice recognition software  Occasional wrong word or "sound a like" substitutions may have occurred due to the inherent limitations of voice recognition software    Read the chart carefully and recognize, using context, where substitutions have occurred   ==  Donato Ward MD  [facility]  Internal Medicine Residency PGY-3

## 2021-08-30 NOTE — OP NOTE
OPERATIVE REPORT  PATIENT NAME: Antonio Marks    :  1965  MRN: [id number]  Pt Location: [facility] CYSTO ROOM 01    SURGERY DATE: 2021    Surgeon(s) and Role:     * Franklin Finnegan MD - Primary    Preop Diagnosis:  Bladder tumor [D49 4]    Post-Op Diagnosis Codes: * Bladder tumor [D49 4]    Procedure(s) (LRB):  TRANSURETHRAL RESECTION OF BLADDER TUMOR (TURBT) (N/A)  CYSTOSCOPY (N/A)-approximately 9 cm of tumor resected, anterior wall and trigone    Specimen(s):  ID Type Source Tests Collected by Time Destination   1 : Bladder Tumor, trigone, anterior wal Tissue Urinary Bladder TISSUE EXAM Franklin Finnegan MD 2021 1046        Estimated Blood Loss:   Minimal    Drains:  Urethral Catheter Three way 24 Fr  (Active)   Reasons to continue Urinary Catheter  Post-operative urological requirements 21 1215   Site Assessment Clean;Skin intact; Patent 21 1215   Collection Container Standard drainage bag 21 1215   Securement Method Securing device (Describe) 21 1215   Irrigant Normal saline 21 1215   Number of days: 0       Anesthesia Type:   General    Operative Indications:  Bladder tumor [D49 4]  9 cm total, trigone and anterior wall, gross hematuria with clot retention    Operative Findings:  Very large papillary tumors, the largest in the center trigone extending to the posterior wall-about 7 cm for the trigonal tumor, and 2 cm for the anterior wall tumor  Both resected deeply, with perforation of the trigone into the perivesical space  Complications:   None    Procedure and Technique:  31-year-old man with 5 years of gross hematuria who does not like to see doctors  He also was found to have severe hypertension  He has been admitted with urinary retention and gross hematuria Pedro catheter has been placed  CT scan was ordered by me which shows normal kidneys but a large bladder mass    Because of this I discussed this with the patient and given that he has heavy smoker I told him he most likely has bladder cancer and he needs TURBT to stop the bleeding and to treat the disease  The risks of bleeding infection damage to the urinary tract possible perforation and need for open surgery and need for additional procedures were explained he gives informed consent  Patient was brought to the operating room identified properly  LMA was induced the patient was prepped and draped in dorsal lithotomy position usual fashion  Time-out was performed  Cystoscopy was carried out with a 22 Turkmen cystoscopy sheath and 30 degree lens  Urethra was normal without stricture  The prostate was non occlusive but did have some friable vessels and some of the tissue looked a little suspicious  No laura tumor in the prostatic urethra  In the bladder itself, there were 2 very large lesions, 1 approximately 7 cm in the trigone in the posterior wall inferior portion itself, then a 2 cm tumor in the anterior wall  These were papillary lesions  There was a small amount of clot  Using the 32 Turkmen resectoscope sheath, I accessed the bladder with the visual obturator then exchanged this for the resectoscope  I performed complete resection of both tumors deeply, and perivesical fat was seen in the right trigone  The right orifice did appear to be intact at the end of the procedure and the left did not appear to be involved either  Hemostasis was achieved electrocautery an Ellik evacuator was used to evacuate the fragments  Due to the extensiveness of the extension in the perforation in the right trigone, decided leave a catheter so I left a 24 Turkmen rib hematuria catheter 3 way and this was connected to CBI  The irrigant was clear at the end of the procedure     I was present for the entire procedure and A qualified resident physician was not available    Patient Disposition:  PACU  and extubated and stable    SIGNATURE: Sriram Cain MD  DATE: August 30, 2021  TIME: 1:13 PM

## 2021-08-30 NOTE — PROGRESS NOTES
INTERNAL MEDICINE RESIDENCY PROGRESS NOTE     Name: Orion Todd   Age & Sex: 54 y o  male   MRN: [id number]  Unit/Bed#: OR POOL   Encounter: [id number]  Team: SOD Team B     PATIENT INFORMATION     Name: Orion Todd   Age & Sex: 54 y o  male   MRN: [id number]  Hospital Stay Days: 1    ASSESSMENT/PLAN     Principal Problem:    Acute urinary retention  Active Problems:    Urinary tract infection    Gross hematuria    Smoking      Smoking  Assessment & Plan  Patient smokes 2 packs per day  Continue 21 mg nicotine patch    Gross hematuria  Assessment & Plan  Patient reports having intermittent gross hematuria over the past 3 years  Patient producing bright red urine today, clotted off Pedro catheter  Patient has extensive smoking history  Hemoglobin 15 2  Plan  See assessment plan for urinary retention    Urinary tract infection  Assessment & Plan  Patient is complaining of urinary urgency and retention  Tachycardic in 100s on presentation  No fever or tachypnea  Innumerable rbc's and 10-20 wbc's on urine microscopy  Leukocytosis of 14 9    Plan  Follow up urine cultures  Continue Empiric IV Ceftriaxone  See assessment and plan for urinary retention    Hypertensive urgency  Assessment & Plan  Patient has blood pressures in the 190s over 100s consultation, pulse in the 100s  Based on patient's home medications, he has history of hypertension  Plan  Pain control with Dilaudid  P r n  Labetalol and hydralazine  Continue home HCTZ, lisinopril, and metoprolol    * Acute urinary retention  Assessment & Plan  59-year-old male with history of hypertension complaining of urinary retention starting 8/28 afternoon  Patient had been experiencing strong urge to urinate but only putting out a small amount of urine  Pedro catheter was placed and has been draining pink urine  Catheter has had to be removed, flushed, and replaced 3x due to clotting   CT scan found 5 4cm right posterior wall bladder tumor, enhancing and suspicious for TCC  Plan  · TURBT scheduled for today  · NPO prior to surgery  · Continue Flomax  · Continue Moreland catheterization and bladder irrigation      Disposition: Surgical procedure and observation  SUBJECTIVE     Patient seen and examined  No acute events overnight  Patient endorsed feeling well other than having had moreland catheter replaced due to blood clotting  Patient asked about having a new nicotine patch placed as it was approaching 24 hours since last new patch  Patient denied nausea, vomiting, diarrhea; has not had bowel movement since admission  OBJECTIVE     Vitals:    21 1509 21 2347 21 0700   BP: 142/67  134/63 155/73   BP Location: Left arm   Left arm   Pulse: 62  66 60   Resp: 16  18 18   Temp: 98 2 °F (36 8 °C)  98 3 °F (36 8 °C) 98 °F (36 7 °C)   TempSrc: Oral   Oral   SpO2: 98% 98% 98% 95%   Weight:       Height:          Temperature:   Temp (24hrs), Av 2 °F (36 8 °C), Min:98 °F (36 7 °C), Max:98 3 °F (36 8 °C)    Temperature: 98 °F (36 7 °C)  Intake & Output:  I/O       701 -  07 07 -  07 -  07    P  O   118     IV Piggyback  50     Total Intake(mL/kg)  168 (2 2)     Urine (mL/kg/hr)   1200 (4)    Total Output   1200    Net  -188 -1200               Weights:   IBW (Ideal Body Weight): 73 kg    Body mass index is 24 26 kg/m²  Weight (last 2 days)     Date/Time   Weight    21 0700   76 7 (169 09)            Physical Exam  Constitutional:       Appearance: Normal appearance  HENT:      Head: Normocephalic and atraumatic  Mouth/Throat:      Mouth: Mucous membranes are moist       Pharynx: Oropharynx is clear  Cardiovascular:      Rate and Rhythm: Normal rate and regular rhythm  Heart sounds: Normal heart sounds  Pulmonary:      Effort: Pulmonary effort is normal       Breath sounds: Normal breath sounds  Abdominal:      General: Abdomen is flat  Palpations: Abdomen is soft  Tenderness: There is no abdominal tenderness  There is no right CVA tenderness or left CVA tenderness  Musculoskeletal:      Right lower leg: No edema  Left lower leg: No edema  Feet:      Right foot:      Skin integrity: Skin integrity normal       Left foot:      Skin integrity: Skin integrity normal    Neurological:      Mental Status: He is alert and oriented to person, place, and time  Psychiatric:         Mood and Affect: Mood normal         LABORATORY DATA     Labs: I have personally reviewed pertinent reports  Results from last 7 days   Lab Units 08/29/21  0915 08/29/21  0450 08/28/21  2112   WBC Thousand/uL  --  12 60* 14 90*   HEMOGLOBIN g/dL 15 2 13 9* 15 4   HEMATOCRIT % 44 3 41 5* 45 4   PLATELETS Thousands/uL  --  222 287   NEUTROS PCT %  --   --  74   MONOS PCT %  --   --  6      Results from last 7 days   Lab Units 08/29/21  0450 08/28/21  2112   POTASSIUM mmol/L 3 8 3 9   CHLORIDE mmol/L 104 102   CO2 mmol/L 23 25   BUN mg/dL 25 18   CREATININE mg/dL 0 88 1 08   CALCIUM mg/dL 8 2* 8 9   ALK PHOS U/L 47 54   ALT U/L 14 15   AST U/L 18 22                            IMAGING & DIAGNOSTIC TESTING     Radiology Results: I have personally reviewed pertinent reports  CT abdomen pelvis w wo contrast    Result Date: 8/29/2021  Impression: No solid renal mass No evidence of ureteric or urinary tract obstruction 5 4 cm filling defect noted within the urinary bladder with suggestion of enhancement suspicious for a bladder mass, correlation with cystoscopy suggested No pelvic sidewall lymphadenopathy Significant finding notification has been created Workstation performed: [id number]     Other Diagnostic Testing: I have personally reviewed pertinent reports      ACTIVE MEDICATIONS     Current Facility-Administered Medications   Medication Dose Route Frequency    [MAR Hold] cefTRIAXone (ROCEPHIN) 1,000 mg in dextrose 5 % 50 mL IVPB  1,000 mg Intravenous Q24H    [MAR Hold] nicotine (NICODERM CQ) 21 mg/24 hr TD 24 hr patch 21 mg  21 mg Transdermal Daily    [MAR Hold] oxyCODONE (ROXICODONE) immediate release tablet 10 mg  10 mg Oral Q6H PRN    [MAR Hold] oxyCODONE (ROXICODONE) IR tablet 5 mg  5 mg Oral Q6H PRN    sterile water irrigation solution    PRN    [MAR Hold] tamsulosin (FLOMAX) capsule 0 4 mg  0 4 mg Oral Daily With Dinner     Facility-Administered Medications Ordered in Other Encounters   Medication Dose Route Frequency    cefTRIAXone (ROCEPHIN) injection   Intravenous PRN    dexamethasone (PF) (DECADRON) injection   Intravenous PRN    fentanyl citrate (PF) 100 MCG/2ML   Intravenous PRN    Ketamine HCl   Intravenous PRN    lactated ringers infusion   Intravenous Continuous PRN    midazolam (VERSED) injection   Intravenous PRN    phenylephrine bolus from bag   Intravenous PRN    propofol (DIPRIVAN) 200 MG/20ML bolus injection   Intravenous PRN    ROCuronium (ZEMURON) injection   Intravenous PRN       Portions of the record may have been created with voice recognition software  Occasional wrong word or "sound a like" substitutions may have occurred due to the inherent limitations of voice recognition software    Read the chart carefully and recognize, using context, where substitutions have occurred   ==  [facility] [address]  Internal Medicine Residency MS3

## 2021-08-30 NOTE — ANESTHESIA PREPROCEDURE EVALUATION
Procedure:  TRANSURETHRAL RESECTION OF BLADDER TUMOR (TURBT) (N/A Bladder)    Relevant Problems   CARDIO   (+) Hypertensive urgency      PULMONARY   (+) Smoking      He smokes 2 packs cigarettes per day, and drinks a 12 pack on weekends  Physical Exam    Airway    Mallampati score: III  TM Distance: >3 FB  Neck ROM: full     Dental   Comment: Poor denition  Multiple loose teeth,     Cardiovascular  Rhythm: regular, Rate: normal,     Pulmonary  Breath sounds clear to auscultation,     Other Findings      NPO since 8pm  Drank water at 7am    Anesthesia Plan  ASA Score- 3     Anesthesia Type- general with ASA Monitors  Additional Monitors:   Airway Plan: ETT and LMA  Comment: Given that the patient has multiple loose teeth we discussed the possibility of the teeth becoming dislodged in the airway which would require a bronchoscopy  He acknowldeges he could wake up with teeth missing  All questions were addressed  Plan Factors-Exercise tolerance (METS): <4 METS  Chart reviewed  Patient summary reviewed  Patient is a current smoker  Patient did not smoke on day of surgery  Induction- intravenous  Postoperative Plan- Plan for postoperative opioid use  Informed Consent- Anesthetic plan and risks discussed with patient  I personally reviewed this patient with the CRNA  Discussed and agreed on the Anesthesia Plan with the CRNA  Zoila Britton

## 2021-08-31 ENCOUNTER — APPOINTMENT (INPATIENT)
Dept: RADIOLOGY | Facility: HOSPITAL | Age: 56
DRG: 669 | End: 2021-08-31
Payer: COMMERCIAL

## 2021-08-31 ENCOUNTER — TELEPHONE (OUTPATIENT)
Dept: OTHER | Facility: HOSPITAL | Age: 56
End: 2021-08-31

## 2021-08-31 VITALS
RESPIRATION RATE: 20 BRPM | DIASTOLIC BLOOD PRESSURE: 77 MMHG | HEIGHT: 70 IN | OXYGEN SATURATION: 99 % | WEIGHT: 169.09 LBS | HEART RATE: 57 BPM | SYSTOLIC BLOOD PRESSURE: 120 MMHG | TEMPERATURE: 97.5 F | BODY MASS INDEX: 24.21 KG/M2

## 2021-08-31 LAB
ANION GAP SERPL CALCULATED.3IONS-SCNC: 5 MMOL/L (ref 4–13)
BUN SERPL-MCNC: 16 MG/DL (ref 5–25)
CALCIUM SERPL-MCNC: 8.1 MG/DL (ref 8.3–10.1)
CHLORIDE SERPL-SCNC: 108 MMOL/L (ref 100–108)
CO2 SERPL-SCNC: 27 MMOL/L (ref 21–32)
CREAT SERPL-MCNC: 0.75 MG/DL (ref 0.6–1.3)
GFR SERPL CREATININE-BSD FRML MDRD: 103 ML/MIN/1.73SQ M
GLUCOSE SERPL-MCNC: 98 MG/DL (ref 65–140)
POTASSIUM SERPL-SCNC: 4.4 MMOL/L (ref 3.5–5.3)
SODIUM SERPL-SCNC: 140 MMOL/L (ref 136–145)

## 2021-08-31 PROCEDURE — 99232 SBSQ HOSP IP/OBS MODERATE 35: CPT | Performed by: PHYSICIAN ASSISTANT

## 2021-08-31 PROCEDURE — G1004 CDSM NDSC: HCPCS

## 2021-08-31 PROCEDURE — 71260 CT THORAX DX C+: CPT

## 2021-08-31 PROCEDURE — 80048 BASIC METABOLIC PNL TOTAL CA: CPT | Performed by: STUDENT IN AN ORGANIZED HEALTH CARE EDUCATION/TRAINING PROGRAM

## 2021-08-31 RX ORDER — OXYBUTYNIN CHLORIDE 5 MG/1
5 TABLET ORAL 3 TIMES DAILY PRN
Qty: 30 TABLET | Refills: 0 | Status: SHIPPED | OUTPATIENT
Start: 2021-08-31 | End: 2021-08-31

## 2021-08-31 RX ORDER — OXYBUTYNIN CHLORIDE 5 MG/1
5 TABLET ORAL 3 TIMES DAILY PRN
Qty: 30 TABLET | Refills: 0 | Status: SHIPPED | OUTPATIENT
Start: 2021-08-31 | End: 2021-11-04

## 2021-08-31 RX ADMIN — IOHEXOL 85 ML: 350 INJECTION, SOLUTION INTRAVENOUS at 11:43

## 2021-08-31 RX ADMIN — NICOTINE 21 MG: 21 PATCH, EXTENDED RELEASE TRANSDERMAL at 09:49

## 2021-08-31 RX ADMIN — CEFTRIAXONE 1000 MG: 1 INJECTION, POWDER, FOR SOLUTION INTRAMUSCULAR; INTRAVENOUS at 12:01

## 2021-08-31 NOTE — UTILIZATION REVIEW
Initial Clinical Review    Admission: Date/Time/Statement:   Admission Orders (From admission, onward)     Ordered        08/29/21 0825  Inpatient Admission  Once                   Orders Placed This Encounter   Procedures    Inpatient Admission     Standing Status:   Standing     Number of Occurrences:   1     Order Specific Question:   Level of Care     Answer:   Med Surg [16]     Order Specific Question:   Estimated length of stay     Answer:   More than 2 Midnights     Order Specific Question:   Certification     Answer:   I certify that inpatient services are medically necessary for this patient for a duration of greater than two midnights  See H&P and MD Progress Notes for additional information about the patient's course of treatment  Initial Presentation: 55 y/o male ADMITTED INPATIENT to M/S/ unit with ACUTE URINARY RETENTION, UTI AND GROSS HEMATURIA  Pt initially presented to [facility] ED with c/o urinary retention starting afternoon of 8/28  Reports experiencing strong urge to urinate, but when he attempts to urinate only a small amount of blood comes out  He has noted intermittent bloody urine over the past 3 years, and he has not seen a doctor in several years  IN [facility] ED he was noted to tachycardic and hypertensive in the 190's over 100's  UA (+) innumerable RBC's and 10-20 WBC's  WBC 12 6, Hgb 15 2  Bactrin given in ED  While attempting Pedro cath placement, the patient was noted to have spontaneous bright red urine  After Pedro catheter was placed, was clotted off then replaced  He was given Dilaudid for pain, and given his home antihypertensives:  Metoprolol, lisinopril, and hydrochlorothiazide and tx'd to [facility] for Urology eval and further tx  Continue pain control  Npo  Bladder scan  Flomax  Pedro cath with cont bladder irrigation  CT pending  Urology consulted with plan for TURBT tomorrow        OPERATIVE REPORT  SURGERY DATE: 8/30/2021  Procedure(s) (LRB):  TRANSURETHRAL RESECTION OF BLADDER TUMOR (TURBT) (N/A)  CYSTOSCOPY (N/A)-approximately 9 cm of tumor resected, anterior wall and trigone  Anesthesia Type:   General   Operative Findings:  Very large papillary tumors, the largest in the center trigone extending to the posterior wall-about 7 cm for the trigonal tumor, and 2 cm for the anterior wall tumor  Both resected deeply, with perforation of the trigone into the perivesical space  8/30  S/p TURBT -- moreland cath to stay in place at least 1 wk per urology  Hgb 15 2  f/u Ucx  Continue IV ceftriaxone  Analgesics prn  Continue home BP meds  Flomax  Reg diet  SCD's    OOB      ED Triage Vitals   Temperature Pulse Respirations Blood Pressure SpO2   08/29/21 0700 08/29/21 0700 08/29/21 0700 08/29/21 0700 08/29/21 0700   98 2 °F (36 8 °C) 96 16 152/69 97 %      Temp Source Heart Rate Source Patient Position - Orthostatic VS BP Location FiO2 (%)   08/29/21 0700 08/29/21 0700 08/29/21 0700 08/29/21 0700 --   Oral Monitor Lying Right arm       Pain Score       08/29/21 0855       No Pain          Wt Readings from Last 1 Encounters:   08/29/21 76 7 kg (169 lb 1 5 oz)     Additional Vital Signs:   Date/Time  Temp  Pulse  Resp  BP  MAP (mmHg)  SpO2  O2 Flow Rate (L/min)  O2 Device  Patient Position - Orthostatic VS   08/30/21 1500  98 1 °F (36 7 °C)  77  16  122/65  --  --  --  --  --   08/30/21 1241  97 8 °F (36 6 °C)  65  18  142/85  100  --  --  --  Lying   08/30/21 1215  99 °F (37 2 °C)  70  17  --  --  95 %  --  None (Room air)  --   08/30/21 1200  --  70  17  119/76  --  95 %  --  --  --   08/30/21 1145  --  84  20  123/85  --  97 %  --  None (Room air)  --   08/30/21 1142  99 1 °F (37 3 °C)  85  20  146/72  --  96 %  6 L/min  Simple mask  --   08/30/21 0700  98 °F (36 7 °C)  60  18  155/73  105  95 %  --  None (Room air)  Lying   08/29/21 2347  98 3 °F (36 8 °C)  66  18  134/63  90  98 %  --  --  --   08/29/21 2000  --  --  --  --  --  98 %  --  None (Room air)  --   08/29/21 1509  98 2 °F (36 8 °C)  62  16  142/67  95  98 %  --  None (Room air)  Lying   08/29/21 0853  --  73  --  138/79  102  --  --  --  Lying   08/29/21 0700  98 2 °F (36 8 °C)  96  16  152/69  99  97 %  --  None (Room air)  Lying       Pertinent Labs/Diagnostic Test Results:   CT A/P 8/29 --   No solid renal mass   No evidence of ureteric or urinary tract obstruction   5 4 cm filling defect noted within the urinary bladder with suggestion of enhancement suspicious for a bladder mass, correlation with cystoscopy suggested   No pelvic sidewall lymphadenopathy   Significant finding notification has been created       Results from last 7 days   Lab Units 08/30/21  1604 08/29/21  0915 08/29/21  0450 08/28/21  2112   WBC Thousand/uL 13 55*  --  12 60* 14 90*   HEMOGLOBIN g/dL 14 4 15 2 13 9* 15 4   HEMATOCRIT % 42 5 44 3 41 5* 45 4   PLATELETS Thousands/uL 209  --  222 287   NEUTROS ABS Thousands/µL  --   --   --  11 00*     Results from last 7 days   Lab Units 08/29/21  0450 08/28/21  2112   SODIUM mmol/L 134 136   POTASSIUM mmol/L 3 8 3 9   CHLORIDE mmol/L 104 102   CO2 mmol/L 23 25   ANION GAP mmol/L 7 9   BUN mg/dL 25 18   CREATININE mg/dL 0 88 1 08   EGFR ml/min/1 73sq m 97 77   CALCIUM mg/dL 8 2* 8 9     Results from last 7 days   Lab Units 08/29/21  0450 08/28/21  2112   AST U/L 18 22   ALT U/L 14 15   ALK PHOS U/L 47 54   TOTAL PROTEIN g/dL 6 3* 6 8   ALBUMIN g/dL 3 8 4 4   TOTAL BILIRUBIN mg/dL 0 40 0 50     Results from last 7 days   Lab Units 08/29/21  0450 08/28/21 2112   GLUCOSE RANDOM mg/dL 98 93     Results from last 7 days   Lab Units 08/28/21 2147   CLARITY UA  Cloudy*   COLOR UA  Red*   SPEC GRAV UA  1 010   PH UA  6 5   GLUCOSE UA mg/dl Trace*   KETONES UA mg/dl 15 (1+)*   BLOOD UA  3+*   PROTEIN UA mg/dl Interference- unable to analyze   NITRITE UA  Interference- unable to analyze   BILIRUBIN UA  Interference- unable to analyze*   UROBILINOGEN UA E U /dl Interference-unable to analyze   LEUKOCYTES UA  2+*   WBC UA /hpf 10-20*   RBC UA /hpf Innumerable*   BACTERIA UA /hpf None Seen   EPITHELIAL CELLS WET PREP /hpf Occasional     Results from last 7 days   Lab Units 08/28/21 2147   URINE CULTURE  No Growth <1000 cfu/mL       History reviewed  No pertinent past medical history  Present on Admission:   Acute urinary retention   Urinary tract infection   Gross hematuria   Smoking   Bladder mass      Admitting Diagnosis: Acute urinary retention [R33 8]  Age/Sex: 54 y o  male  Admission Orders:  Scheduled Medications:  cefTRIAXone, 1,000 mg, Intravenous, Q24H  nicotine, 21 mg, Transdermal, Daily  tamsulosin, 0 4 mg, Oral, Daily With Dinner    Continuous IV Infusions:  lactated ringers, 50 mL/hr, Intravenous, Continuous  sodium chloride, 3,000 mL, Irrigation, Continuous    PRN Meds:  oxybutynin, 5 mg, Oral, TID PRN  oxyCODONE, 10 mg, Oral, Q6H PRN 8/29 x1  oxyCODONE, 5 mg, Oral, Q6H PRN  phenazopyridine, 200 mg, Oral, Q8H PRN        Network Utilization Review Department  ATTENTION: Please call with any questions or concerns to [phone] and carefully listen to the prompts so that you are directed to the right person  All voicemails are confidential   Avelina Cowden all requests for admission clinical reviews, approved or denied determinations and any other requests to dedicated fax number below belonging to the campus where the patient is receiving treatment   List of dedicated fax numbers for the Facilities:  [address] DENIALS (Administrative/Medical Necessity) [phone]   [address] (Maternity/NICU/Pediatrics) [phone]   [address] [phone]   [address] Brigido Lawrence 0[id number] [facility] [phone] 187 [facility] Enrique Keshav Milligan [address] [place] [address] [phone]

## 2021-08-31 NOTE — TELEPHONE ENCOUNTER
53 y/o male s/p TURBT with Dr Lan Verdugo  He will require moreland catheter for 1 week  Moreland removal in am  Next Tuesday preferably  he will also require a follow up appointment with MD to discuss pathology in 2 weeks  Patient lives in [place] but can travel  Please help coordinate  Thank you       Roger Love PA-C

## 2021-08-31 NOTE — DISCHARGE SUMMARY
INTERNAL MEDICINE RESIDENCY DISCHARGE SUMMARY     Jossie Proctor   54 y o  male  MRN: [id number]  Room/Bed: [facility] 307/[facility] 307-01     [address] [place] [address]   Encounter: [id number]    Principal Problem:    Acute urinary retention  Active Problems:    Urinary tract infection    Gross hematuria    Smoking    Bladder mass      Bladder mass  Assessment & Plan  S/p TURBT w/ urology on 8/30/2021   · 8-9 cm of mass was removed  There was perforation of the right lateral trigone  · Pedro catheter will need to stay in place for at least 1 week per urology  · Pending biopsy results  · Will follow up with urology outpatient  Smoking  Assessment & Plan  Patient smokes 2 packs per day  Continue 21 mg nicotine patch    Gross hematuria  Assessment & Plan  Patient reports having intermittent gross hematuria over the past 3 years  Patient producing bright red urine today, clotted off Pedro catheter  Patient has extensive smoking history  Hemoglobin 15 2  Plan  See assessment plan for urinary retention    Urinary tract infection  Assessment & Plan  Patient is complaining of urinary urgency and retention  Tachycardic in 100s on presentation  No fever or tachypnea  Innumerable rbc's and 10-20 wbc's on urine microscopy  Leukocytosis of 14 9    Plan  Follow up urine cultures  Continue Empiric IV Ceftriaxone  See assessment and plan for urinary retention    Hypertensive urgency  Assessment & Plan  Patient has blood pressures in the 190s over 100s consultation, pulse in the 100s  Based on patient's home medications, he has history of hypertension  Plan  Pain control with Dilaudid  P r n  Labetalol and hydralazine      * Acute urinary retention  Assessment & Plan  63-year-old male with history of hypertension complaining of urinary retention starting 8/28 afternoon  Patient had been experiencing strong urge to urinate but only putting out a small amount of urine    Pedro catheter was placed and has been draining pink urine  Catheter has had to be removed, flushed, and replaced 3x due to clotting  CT scan found 5 4cm right posterior wall bladder tumor, enhancing and suspicious for TCC  TURBT POD #1    Plan  · Continue Flomax  · Continue Pedro catheterization and bladder irrigation  · Appreciate consult from urology        [address] [place] [address] Ave     19-year-old male with past medical history significant for smoking 2 packs a day for 20 years and poor dentition presented to [facility] Emergency Department 8/28 with a 6 hour history of an inability to pass urine in spite of feeling the need to go  When uresis was attempted only blood passed  Empiric antibiotics Bactrim and IV ceftriaxone were initiated with concern for infection  8/29 CT demonstrated a 5 4 cm filling defect suspicious for a mass on the posterior bladder wall  8/30 transurethral resection of bladder tumor was performed without incident  A 7 cm trigonal mass and a 2 cm anterior wall mass were resected and sent for pathological analysis  These were apparently papillary lesions  The patient is stable post surgery  A postop physical exam demonstrated an incidental mass at the right sternoclavicular joint  CT will be performed for further investigation  The patient is currently medically stable and will follow-up as outpatient  Review of Systems   Cardiovascular: Negative  Gastrointestinal: Negative  Genitourinary: Negative  Musculoskeletal: Negative  Neurological: Negative  Physical Exam  Vitals and nursing note reviewed  Constitutional:       General: He is not in acute distress  Appearance: Normal appearance  He is normal weight  He is not ill-appearing, toxic-appearing or diaphoretic  HENT:      Head: Normocephalic  Nose: Nose normal    Eyes:      General: No scleral icterus  Right eye: No discharge  Left eye: No discharge  Cardiovascular:      Rate and Rhythm: Normal rate and regular rhythm  Pulses: Normal pulses  Heart sounds: Normal heart sounds  No murmur heard  No friction rub  No gallop  Pulmonary:      Effort: Pulmonary effort is normal  No respiratory distress  Breath sounds: Normal breath sounds  No stridor  No wheezing, rhonchi or rales  Abdominal:      General: Abdomen is flat  Bowel sounds are normal  There is no distension  Palpations: Abdomen is soft  Tenderness: There is no abdominal tenderness  There is no guarding  Skin:     General: Skin is warm and dry  Coloration: Skin is not jaundiced  Neurological:      Mental Status: He is alert  Psychiatric:         Mood and Affect: Mood normal          Behavior: Behavior normal                  DISCHARGE INFORMATION     PCP at Discharge: none on file    Admitting Provider: Kadi Du MD  Admission Date: 8/29/2021    Discharge Provider: Kadi Du MD  Discharge Date: 8/31/2021    [facility]  Discharge Condition: good  Discharge with Lines: no    Discharge Diet: regular diet  Activity Restrictions: none  Test Results Pending at Discharge:  Pathology report for resected bladder masses, radiology report of CT chest    Discharge Diagnoses:  Principal Problem:    Acute urinary retention  Active Problems:    Urinary tract infection    Gross hematuria    Smoking    Bladder mass  Resolved Problems:    * No resolved hospital problems   *      Consulting Providers:      Diagnostic & Therapeutic Procedures Performed:  CT abdomen pelvis w wo contrast    Result Date: 8/29/2021  Impression: No solid renal mass No evidence of ureteric or urinary tract obstruction 5 4 cm filling defect noted within the urinary bladder with suggestion of enhancement suspicious for a bladder mass, correlation with cystoscopy suggested No pelvic sidewall lymphadenopathy Significant finding notification has been created Workstation performed: [id number]       Code Status: Level 1 - Full Code  Advance Directive & Living Will: <no information>  Power of :    POLST:      Medications:  Current Discharge Medication List        Current Discharge Medication List        Current Discharge Medication List      CONTINUE these medications which have NOT CHANGED    Details   hydrochlorothiazide (HYDRODIURIL) 25 mg tablet Take 1 tablet by mouth daily      lisinopril (ZESTRIL) 40 mg tablet Take 1 tablet by mouth daily      metoprolol succinate (TOPROL-XL) 50 mg 24 hr tablet Take 1 tablet by mouth daily      !! sulfamethoxazole-trimethoprim (BACTRIM DS) 800-160 mg per tablet Take 1 tablet by mouth 2 (two) times a day for 7 days smx-tmp DS (BACTRIM) 800-160 mg tabs (1tab q12 D10)  Qty: 14 tablet, Refills: 0    Associated Diagnoses: Urinary retention; Gross hematuria; UTI (urinary tract infection)      !! sulfamethoxazole-trimethoprim (BACTRIM DS) 800-160 mg per tablet Take 1 tablet by mouth 2 (two) times a day for 7 days smx-tmp DS (BACTRIM) 800-160 mg tabs (1tab q12 D10)  Qty: 14 tablet, Refills: 0    Associated Diagnoses: Urinary retention; Gross hematuria; UTI (urinary tract infection)      ! ! tamsulosin (FLOMAX) 0 4 mg Take 1 capsule (0 4 mg total) by mouth daily with dinner  Qty: 7 capsule, Refills: 0    Associated Diagnoses: Urinary retention; Gross hematuria; UTI (urinary tract infection)      ! ! tamsulosin (FLOMAX) 0 4 mg Take 1 capsule (0 4 mg total) by mouth daily with dinner  Qty: 7 capsule, Refills: 0    Associated Diagnoses: Urinary retention; Gross hematuria; UTI (urinary tract infection)      tobramycin-dexamethasone (TOBRADEX) ophthalmic suspension Administer 1 drop into the left eye every 4 (four) hours while awake for 7 days  Qty: 5 mL, Refills: 0    Comments: May substitute plain tobramycin is the tobradex is cost prohibitive (but they appear to have the same $10 copay from my end of things)  Associated Diagnoses: Corneal abrasion of left eye due to contact lens       !! - Potential duplicate medications found  Please discuss with provider  Allergies: Allergies   Allergen Reactions    Penicillins Other (See Comments)       FOLLOW-UP     PCP Outpatient Follow-up:  Patient given information for primary care practice and advised to schedule an appointment soon    Consulting Providers Follow-up:  Follow up with Urology     Active Issues Requiring Follow-up:   Analysis and pathology report for resected bladder masses and radiographic findings of CT    Discharge Statement:   I spent 15 minutes minutes discharging the patient  This time was spent on the day of discharge  I had direct contact with the patient on the day of discharge  Additional documentation is required if more than 30 minutes were spent on discharge  Portions of the record may have been created with voice recognition software  Occasional wrong word or "sound a like" substitutions may have occurred due to the inherent limitations of voice recognition software    Read the chart carefully and recognize, using context, where substitutions have occurred     ==  [address] [employer] student year 4

## 2021-08-31 NOTE — CASE MANAGEMENT
Updated Handoff note  Pt is discharging home today w wife  Wife is HHA and support for PT/PT  No needs from CM indicated  Pt will transport via car w/ wife

## 2021-08-31 NOTE — DISCHARGE INSTR - AVS FIRST PAGE
Please follow up with urology  Follow up with PCP in 1-2 weeks    Please call [facility] to find a PCP in your area     You can also call [facility] for a PCP

## 2021-08-31 NOTE — PROGRESS NOTES
Progress Note - urology  Roosevelt Bush 54 y o  male MRN: [id number]  Unit/Bed#: [facility] 307-01 Encounter: [id number]    Assessment & Plan:    Bladder mass:  -postop day 1 transurethral resection of bladder tumor with Dr Christian Vega, progressing well  -patient to have outpatient follow-up to discuss pathology  -physical examination revealed an enlargement of the patient's right medial clavicle near the manubrium,  attending ordered CT of chest with contrast for evaluation  -urethral Pedro catheter in place attached to CBI running pink colored urine  Manual irrigation revealed light pink color  CBI clamped  Will re-evaluate later this afternoon/early morning   -patient is currently clear for discharge at this time  He may follow-up with CT imaging and pathology   -Pedro catheter remain in place for a week  Urology to set up follow-up  Subjective/Objective   Chief Complaint:  None    Subjective:   Patient currently reporting that he is doing well  He denies any nausea, vomiting, fevers, chills or pain  Denies any abdominal pain or flank pain  His urethral Pedro catheter is in place denying any spasms or any discomfort at this time  Patient is currently asking when he would be able to go home  Objective:     Blood pressure 120/77, pulse 57, temperature 97 5 °F (36 4 °C), temperature source Oral, resp  rate 20, height 5' 10" (1 778 m), weight 76 7 kg (169 lb 1 5 oz), SpO2 99 %  ,Body mass index is 24 26 kg/m²  Intake/Output Summary (Last 24 hours) at 8/31/2021 0943  Last data filed at 8/31/2021 0501  Gross per 24 hour   Intake 1294 33 ml   Output 15146 ml   Net -95864 67 ml       Invasive Devices     Peripheral Intravenous Line            Peripheral IV 08/28/21 Left Antecubital 2 days    Peripheral IV 08/30/21 Dorsal (posterior); Right Forearm <1 day          Drain            Urethral Catheter Three way 24 Fr  <1 day              Physical Exam  Constitutional:       General: He is not in acute distress  Appearance: He is normal weight  He is not ill-appearing, toxic-appearing or diaphoretic  HENT:      Head: Normocephalic and atraumatic  Right Ear: External ear normal       Left Ear: External ear normal       Nose: Nose normal       Mouth/Throat:      Pharynx: Oropharynx is clear  Eyes:      General: No scleral icterus  Conjunctiva/sclera: Conjunctivae normal    Neck:      Comments: Palpable mass on right clavicle  Cardiovascular:      Rate and Rhythm: Normal rate  Pulses: Normal pulses  Heart sounds: No murmur heard  No friction rub  No gallop  Pulmonary:      Effort: Pulmonary effort is normal  No respiratory distress  Breath sounds: No wheezing, rhonchi or rales  Abdominal:      General: Bowel sounds are normal  There is no distension  Palpations: Abdomen is soft  Tenderness: There is no abdominal tenderness  There is no right CVA tenderness or left CVA tenderness  Genitourinary:     Comments: Urethral Pedro catheter in place draining light pink colored urine attached to CBI  Manual irrigation reveals light pink urine  Musculoskeletal:         General: Normal range of motion  Skin:     General: Skin is warm and dry  Neurological:      General: No focal deficit present  Mental Status: He is alert and oriented to person, place, and time  Psychiatric:         Mood and Affect: Mood normal          Behavior: Behavior normal          Thought Content: Thought content normal          Judgment: Judgment normal          Lab, Imaging and other studies:I have personally reviewed pertinent lab results      Lab Results   Component Value Date    WBC 13 55 (H) 08/30/2021    HGB 14 4 08/30/2021    HCT 42 5 08/30/2021    MCV 91 08/30/2021     08/30/2021     Lab Results   Component Value Date    SODIUM 140 08/31/2021    K 4 4 08/31/2021     08/31/2021    CO2 27 08/31/2021    BUN 16 08/31/2021    CREATININE 0 75 08/31/2021    GLUC 98 08/31/2021 CALCIUM 8 1 (L) 08/31/2021       VTE Pharmacologic Prophylaxis: Reason for no pharmacologic prophylaxis Recent T you are BT TURBT**  VTE Mechanical Prophylaxis: sequential compression device     Sandi Ramírez PA-C

## 2021-09-01 ENCOUNTER — TELEPHONE (OUTPATIENT)
Dept: UROLOGY | Facility: MEDICAL CENTER | Age: 56
End: 2021-09-01

## 2021-09-01 ENCOUNTER — TELEPHONE (OUTPATIENT)
Dept: UROLOGY | Facility: CLINIC | Age: 56
End: 2021-09-01

## 2021-09-01 DIAGNOSIS — Z91.89 PULMONARY NODULE LESS THAN 1 CM IN DIAMETER WITH MODERATE TO HIGH RISK FOR MALIGNANT NEOPLASM: Primary | ICD-10-CM

## 2021-09-01 DIAGNOSIS — C67.0 MALIGNANT NEOPLASM OF TRIGONE OF URINARY BLADDER (HCC): ICD-10-CM

## 2021-09-01 DIAGNOSIS — R91.1 PULMONARY NODULE LESS THAN 1 CM IN DIAMETER WITH MODERATE TO HIGH RISK FOR MALIGNANT NEOPLASM: Primary | ICD-10-CM

## 2021-09-01 DIAGNOSIS — C67.3 MALIGNANT NEOPLASM OF ANTERIOR WALL OF URINARY BLADDER (HCC): ICD-10-CM

## 2021-09-01 RX ORDER — LEVOFLOXACIN 5 MG/ML
750 INJECTION, SOLUTION INTRAVENOUS ONCE
Status: CANCELLED | OUTPATIENT
Start: 2021-09-01 | End: 2021-09-01

## 2021-09-01 NOTE — TELEPHONE ENCOUNTER
I called the patient and I reviewed his pathology with him  He had approximately 9 cm tumor total, with about 7 cm tumor of the trigone and a 2 cm anterior wall tumor  According to the pathology, muscle was present in the specimen and was uninvolved with tumor  He has high-grade papillary TCC  There was a trigonal perforation during the case  He had a firm mass on his right clavicular junction with the manubrium so I obtained a CT scan which shows no bony mass, but he does have a 3 mm nodule in his lung and radiology suggested a 3 month CT scan follow-up for this  We will set him up for that  In the form of chest abdomen and pelvis CT  Also we will place a case request for cystoscopy and bladder biopsies in the operating room  This is to make sure everything is cleaned up and there was no further tumor, at least superficially due to the extent of the tumor, and then the patient will likely need BCG or mitomycin  Pedro catheter come out next Monday  Office staff has been contacted to make the arrangements

## 2021-09-01 NOTE — TELEPHONE ENCOUNTER
Pt's wife calling in regards to medications,she questioning if Bactrim is to be continued and Oxybutynin could not be dispensed until 9/8 please review and contact her directly

## 2021-09-01 NOTE — TELEPHONE ENCOUNTER
----- Message from Chuy Ramos MD sent at 9/1/2021  1:56 PM EDT -----  Please call pt to elizabeth moreland early Monday AM, and Tyrese Cortez please set him up for cysto, bladder biopsies in [facility]

## 2021-09-01 NOTE — TELEPHONE ENCOUNTER
Patient returned call  Please call him back  If he does not answer phone    Call his wife at [phone]

## 2021-09-01 NOTE — TELEPHONE ENCOUNTER
Called and spoke with patient  Patient scheduled 09/07/21 at 9:00 am for moreland removal  Address given to patient

## 2021-09-01 NOTE — TELEPHONE ENCOUNTER
Call placed to patient to schedule him for Pedro removal  No answer and voicemail box not currently set up  Will attempt later

## 2021-09-03 NOTE — TELEPHONE ENCOUNTER
Called patient to schedule Appointment for surgery and patient refused to schedule until after stent is removed on 09/07/2021  I will call back after his visit  to schedule

## 2021-09-05 ENCOUNTER — HOSPITAL ENCOUNTER (OUTPATIENT)
Facility: HOSPITAL | Age: 56
Setting detail: OBSERVATION
Discharge: HOME | End: 2021-09-06
Attending: EMERGENCY MEDICINE | Admitting: INTERNAL MEDICINE
Payer: COMMERCIAL

## 2021-09-05 ENCOUNTER — APPOINTMENT (EMERGENCY)
Dept: NON INVASIVE DIAGNOSTICS | Facility: HOSPITAL | Age: 56
End: 2021-09-05
Payer: COMMERCIAL

## 2021-09-05 DIAGNOSIS — I16.0 HYPERTENSIVE URGENCY: ICD-10-CM

## 2021-09-05 DIAGNOSIS — I82.433 ACUTE BILATERAL DEEP VEIN THROMBOSIS (DVT) OF POPLITEAL VEINS (HCC): Primary | ICD-10-CM

## 2021-09-05 PROBLEM — I82.409 DVT (DEEP VENOUS THROMBOSIS) (HCC): Status: ACTIVE | Noted: 2021-09-05

## 2021-09-05 LAB
ALBUMIN SERPL BCP-MCNC: 3.9 G/DL (ref 3.5–5.7)
ALP SERPL-CCNC: 59 U/L (ref 40–150)
ALT SERPL W P-5'-P-CCNC: 10 U/L (ref 7–52)
ANION GAP SERPL CALCULATED.3IONS-SCNC: 9 MMOL/L (ref 4–13)
APTT PPP: 29 SECONDS (ref 23–37)
APTT PPP: 79 SECONDS (ref 23–37)
AST SERPL W P-5'-P-CCNC: 16 U/L (ref 13–39)
BASOPHILS # BLD AUTO: 0.1 THOUSANDS/ΜL (ref 0–0.1)
BASOPHILS NFR BLD AUTO: 1 % (ref 0–2)
BILIRUB SERPL-MCNC: 0.5 MG/DL (ref 0.2–1)
BUN SERPL-MCNC: 16 MG/DL (ref 7–25)
CALCIUM SERPL-MCNC: 8.6 MG/DL (ref 8.6–10.5)
CHLORIDE SERPL-SCNC: 103 MMOL/L (ref 98–107)
CO2 SERPL-SCNC: 25 MMOL/L (ref 21–31)
CREAT SERPL-MCNC: 0.91 MG/DL (ref 0.7–1.3)
EOSINOPHIL # BLD AUTO: 0.2 THOUSAND/ΜL (ref 0–0.61)
EOSINOPHIL NFR BLD AUTO: 2 % (ref 0–5)
ERYTHROCYTE [DISTWIDTH] IN BLOOD BY AUTOMATED COUNT: 14.2 % (ref 11.5–14.5)
GFR SERPL CREATININE-BSD FRML MDRD: 95 ML/MIN/1.73SQ M
GLUCOSE SERPL-MCNC: 93 MG/DL (ref 65–99)
HCT VFR BLD AUTO: 40.2 % (ref 42–47)
HGB BLD-MCNC: 13.8 G/DL (ref 14–18)
INR PPP: 0.9 (ref 0.84–1.19)
LYMPHOCYTES # BLD AUTO: 1.5 THOUSANDS/ΜL (ref 0.6–4.47)
LYMPHOCYTES NFR BLD AUTO: 18 % (ref 21–51)
MCH RBC QN AUTO: 30.6 PG (ref 26–34)
MCHC RBC AUTO-ENTMCNC: 34.3 G/DL (ref 31–37)
MCV RBC AUTO: 89 FL (ref 81–99)
MONOCYTES # BLD AUTO: 0.7 THOUSAND/ΜL (ref 0.17–1.22)
MONOCYTES NFR BLD AUTO: 8 % (ref 2–12)
NEUTROPHILS # BLD AUTO: 6.2 THOUSANDS/ΜL (ref 1.4–6.5)
NEUTS SEG NFR BLD AUTO: 71 % (ref 42–75)
PLATELET # BLD AUTO: 201 THOUSANDS/UL (ref 149–390)
PMV BLD AUTO: 7.4 FL (ref 8.6–11.7)
POTASSIUM SERPL-SCNC: 4 MMOL/L (ref 3.5–5.5)
PROT SERPL-MCNC: 6.4 G/DL (ref 6.4–8.9)
PROTHROMBIN TIME: 12.3 SECONDS (ref 11.6–14.5)
RBC # BLD AUTO: 4.51 MILLION/UL (ref 4.3–5.9)
SODIUM SERPL-SCNC: 137 MMOL/L (ref 134–143)
WBC # BLD AUTO: 8.7 THOUSAND/UL (ref 4.8–10.8)

## 2021-09-05 PROCEDURE — 85730 THROMBOPLASTIN TIME PARTIAL: CPT | Performed by: INTERNAL MEDICINE

## 2021-09-05 PROCEDURE — 99285 EMERGENCY DEPT VISIT HI MDM: CPT

## 2021-09-05 PROCEDURE — 96365 THER/PROPH/DIAG IV INF INIT: CPT

## 2021-09-05 PROCEDURE — 99285 EMERGENCY DEPT VISIT HI MDM: CPT | Performed by: EMERGENCY MEDICINE

## 2021-09-05 PROCEDURE — 99220 PR INITIAL OBSERVATION CARE/DAY 70 MINUTES: CPT | Performed by: INTERNAL MEDICINE

## 2021-09-05 PROCEDURE — 96375 TX/PRO/DX INJ NEW DRUG ADDON: CPT

## 2021-09-05 PROCEDURE — 80053 COMPREHEN METABOLIC PANEL: CPT | Performed by: EMERGENCY MEDICINE

## 2021-09-05 PROCEDURE — 93970 EXTREMITY STUDY: CPT | Performed by: SURGERY

## 2021-09-05 PROCEDURE — 36415 COLL VENOUS BLD VENIPUNCTURE: CPT | Performed by: EMERGENCY MEDICINE

## 2021-09-05 PROCEDURE — 93970 EXTREMITY STUDY: CPT

## 2021-09-05 PROCEDURE — 85730 THROMBOPLASTIN TIME PARTIAL: CPT | Performed by: EMERGENCY MEDICINE

## 2021-09-05 PROCEDURE — 85610 PROTHROMBIN TIME: CPT | Performed by: EMERGENCY MEDICINE

## 2021-09-05 PROCEDURE — 85025 COMPLETE CBC W/AUTO DIFF WBC: CPT | Performed by: EMERGENCY MEDICINE

## 2021-09-05 RX ORDER — TAMSULOSIN HYDROCHLORIDE 0.4 MG/1
0.4 CAPSULE ORAL
Status: DISCONTINUED | OUTPATIENT
Start: 2021-09-05 | End: 2021-09-06 | Stop reason: HOSPADM

## 2021-09-05 RX ORDER — HEPARIN SODIUM 10000 [USP'U]/100ML
3-30 INJECTION, SOLUTION INTRAVENOUS
Status: DISCONTINUED | OUTPATIENT
Start: 2021-09-05 | End: 2021-09-06 | Stop reason: HOSPADM

## 2021-09-05 RX ORDER — OXYBUTYNIN CHLORIDE 5 MG/1
5 TABLET ORAL 3 TIMES DAILY PRN
Status: DISCONTINUED | OUTPATIENT
Start: 2021-09-05 | End: 2021-09-06 | Stop reason: HOSPADM

## 2021-09-05 RX ORDER — LABETALOL 20 MG/4 ML (5 MG/ML) INTRAVENOUS SYRINGE
10 EVERY 6 HOURS PRN
Status: DISCONTINUED | OUTPATIENT
Start: 2021-09-05 | End: 2021-09-06 | Stop reason: HOSPADM

## 2021-09-05 RX ORDER — NICOTINE 21 MG/24HR
1 PATCH, TRANSDERMAL 24 HOURS TRANSDERMAL DAILY
Status: DISCONTINUED | OUTPATIENT
Start: 2021-09-05 | End: 2021-09-06 | Stop reason: HOSPADM

## 2021-09-05 RX ORDER — HEPARIN SODIUM 1000 [USP'U]/ML
6000 INJECTION, SOLUTION INTRAVENOUS; SUBCUTANEOUS ONCE
Status: COMPLETED | OUTPATIENT
Start: 2021-09-05 | End: 2021-09-05

## 2021-09-05 RX ORDER — CHLORTHALIDONE 25 MG/1
25 TABLET ORAL DAILY
Status: DISCONTINUED | OUTPATIENT
Start: 2021-09-06 | End: 2021-09-06 | Stop reason: HOSPADM

## 2021-09-05 RX ORDER — NICOTINE 21 MG/24HR
1 PATCH, TRANSDERMAL 24 HOURS TRANSDERMAL DAILY
Status: DISCONTINUED | OUTPATIENT
Start: 2021-09-06 | End: 2021-09-05

## 2021-09-05 RX ADMIN — HEPARIN SODIUM 6000 UNITS: 1000 INJECTION INTRAVENOUS; SUBCUTANEOUS at 15:48

## 2021-09-05 RX ADMIN — HEPARIN SODIUM 18 UNITS/KG/HR: 10000 INJECTION, SOLUTION INTRAVENOUS at 15:51

## 2021-09-05 RX ADMIN — NICOTINE 1 PATCH: 21 PATCH, EXTENDED RELEASE TRANSDERMAL at 18:15

## 2021-09-05 NOTE — ASSESSMENT & PLAN NOTE
Patient presents following hospitalization with right lower extremity pain and swelling  Lower extremity Dopplers with bilateral extensive deep venous thromboses  Initiated on heparin drip in the ED  No concerning signs or symptoms for pulmonary embolism  Likely provoked in the setting of recent immobilization during hospitalization and likely not receiving DVT prophylaxis in the setting of gross hematuria  Hemoglobin stable    · Continue heparin drip  · Transition to PO anticoagulation in the AM  · Outpatient follow-up with PCP  · Would recommend 3-6 months of anticoagulation as DVT appears to be provoked by recent hospitalization

## 2021-09-05 NOTE — ASSESSMENT & PLAN NOTE
Secondary to bladder mass which is now status post biopsy  Patient has extensive smoking history  Pedro catheter in place as TURBT was complicated by bladder perforation  Hemoglobin stable    · Maintain Pedro catheter  · Trend CBC  · Monitor for signs of bleeding  · Outpatient follow-up with Urology

## 2021-09-05 NOTE — ASSESSMENT & PLAN NOTE
SBP 180s on presentation    · Will start patient on chlorthalidone 25 mg PO daily  · Labetalol 10 mg IV q6 hours PRN SBP > 180 mmHg  · Monitor blood pressure

## 2021-09-05 NOTE — H&P
[address] [facility]  RAFFY&Sherly Foster 1965, 54 y o  male MRN: [id number]  Unit/Bed#: ED 02 Encounter: [id number]  Primary Care Provider: No primary care provider on file  Date and time admitted to hospital: 9/5/2021 12:10 PM    * DVT (deep venous thrombosis) [facility])  Assessment & Plan  Patient presents following hospitalization with right lower extremity pain and swelling  Lower extremity Dopplers with bilateral extensive deep venous thromboses  Initiated on heparin drip in the ED  No concerning signs or symptoms for pulmonary embolism  Likely provoked in the setting of recent immobilization during hospitalization and likely not receiving DVT prophylaxis in the setting of gross hematuria  Hemoglobin stable  · Continue heparin drip  · Transition to PO anticoagulation in the AM  · Outpatient follow-up with PCP  · Would recommend 3-6 months of anticoagulation as DVT appears to be provoked by recent hospitalization    Hypertensive urgency  Assessment & Plan  SBP 180s on presentation  · Will start patient on chlorthalidone 25 mg PO daily  · Labetalol 10 mg IV q6 hours PRN SBP > 180 mmHg  · Monitor blood pressure    Gross hematuria  Assessment & Plan  Secondary to bladder mass which is now status post biopsy  Patient has extensive smoking history  Pedro catheter in place as TURBT was complicated by bladder perforation  Hemoglobin stable  · Maintain Pedro catheter  · Trend CBC  · Monitor for signs of bleeding  · Outpatient follow-up with Urology      VTE Prophylaxis: Heparin gtt  Code Status: Level 1 Full Code     Anticipated Length of Stay:  Patient will be admitted on an Observation basis with an anticipated length of stay of  < 2 midnights  Justification for Hospital Stay: DVT    Total Time for Visit, including Counseling / Coordination of Care: 60 minutes  Greater than 50% of this total time spent on direct patient counseling and coordination of care      Chief Complaint: Calf Pain    History of Present Illness:    Butch De Dios is a 54 y o  male with a past medical history significant for bladder mass status post TURBT complicated by bladder perforation, hypertension who presents with complaints of calf pain and swelling  The patient was recently hospitalized for gross hematuria secondary to bladder mass which is pending pathology  Patient underwent TURBT which was complicated by bladder perforation  Hematuria resolved and the patient was discharged in stable condition with Pedro catheter in place and instructions to follow up with Urology in the outpatient setting  Today, the patient noticed that his bilateral legs felt more swollen and painful than usual   In the ED, all vital signs were found to be stable  Venous Dopplers lower extremity remarkable for bilateral deep venous thromboses  Urology was contacted by ED physician who deemed it safe for anticoagulation as hematuria has resolved and hemoglobin is stable  Vascular Surgery was also contacted in the ED and stated that no thrombectomy is indicated at this time as the deep venous thromboses are distal yet extensive  Review of Systems:    Review of Systems   Constitutional: Negative for chills and fever  HENT: Negative for ear pain and sore throat  Eyes: Negative for pain and visual disturbance  Respiratory: Negative for cough and shortness of breath  Cardiovascular: Negative for chest pain and palpitations  Gastrointestinal: Negative for abdominal pain and vomiting  Genitourinary: Negative for dysuria and hematuria  Musculoskeletal: Negative for arthralgias and back pain  Skin: Negative for color change and rash  Neurological: Negative for seizures and syncope  All other systems reviewed and are negative  Past Medical and Surgical History:     History reviewed  No pertinent past medical history      Past Surgical History:   Procedure Laterality Date    CYSTOSCOPY N/A 8/30/2021    Procedure: CYSTOSCOPY;  Surgeon: Chuy Ramos MD;  [facility];  Service: Urology    TRANSURETHRAL RESECTION OF BLADDER TUMOR N/A 8/30/2021    Procedure: TRANSURETHRAL RESECTION OF BLADDER TUMOR (TURBT); Surgeon: Chuy Ramos MD;  [facility];  Service: Urology       Meds/Allergies:    Prior to Admission medications    Medication Sig Start Date End Date Taking? Authorizing Provider   oxybutynin (DITROPAN) 5 mg tablet Take 1 tablet (5 mg total) by mouth 3 (three) times a day as needed (Bladder spasms) 8/31/21   Arturo Fuchs MD   tamsulosin [facility]) 0 4 mg Take 1 capsule (0 4 mg total) by mouth daily with dinner 8/28/21   Alyx Hamm MD       Allergies: Allergies   Allergen Reactions    Penicillins Other (See Comments)       Social History:     Marital Status: /Civil Union   Substance Use History:   Social History     Substance and Sexual Activity   Alcohol Use Yes     Social History     Tobacco Use   Smoking Status Current Every Day Smoker   Smokeless Tobacco Never Used     Social History     Substance and Sexual Activity   Drug Use Not Currently       Family History:    non-contributory    Physical Exam:     Vitals:   Blood Pressure: (!) 180/77 (09/05/21 1214)  Pulse: 64 (09/05/21 1214)  Temperature: 97 7 °F (36 5 °C) (09/05/21 1214)  Respirations: 18 (09/05/21 1214)  Height: 5' 10" (177 8 cm) (09/05/21 1214)  Weight - Scale: 77 1 kg (170 lb) (09/05/21 1214)  SpO2: 100 % (09/05/21 1214)    Physical Exam  Vitals and nursing note reviewed  Constitutional:       Appearance: He is well-developed  HENT:      Head: Normocephalic and atraumatic  Eyes:      Conjunctiva/sclera: Conjunctivae normal    Cardiovascular:      Rate and Rhythm: Normal rate and regular rhythm  Heart sounds: No murmur heard  Pulmonary:      Effort: Pulmonary effort is normal  No respiratory distress  Breath sounds: Normal breath sounds  Abdominal:      Palpations: Abdomen is soft        Tenderness: There is no abdominal tenderness  Musculoskeletal:         General: Swelling and tenderness present  Cervical back: Neck supple  Right lower leg: Edema present  Left lower leg: Edema present  Skin:     General: Skin is warm and dry  Neurological:      Mental Status: He is alert  Additional Data:     Lab Results: I have reviewed pertinent results     Results from last 7 days   Lab Units 09/05/21  1242   WBC Thousand/uL 8 70   HEMOGLOBIN g/dL 13 8*   HEMATOCRIT % 40 2*   PLATELETS Thousands/uL 201   NEUTROS PCT % 71   LYMPHS PCT % 18*   MONOS PCT % 8   EOS PCT % 2     Results from last 7 days   Lab Units 09/05/21  1242   SODIUM mmol/L 137   POTASSIUM mmol/L 4 0   CHLORIDE mmol/L 103   CO2 mmol/L 25   BUN mg/dL 16   CREATININE mg/dL 0 91   ANION GAP mmol/L 9   CALCIUM mg/dL 8 6   ALBUMIN g/dL 3 9   TOTAL BILIRUBIN mg/dL 0 50   ALK PHOS U/L 59   ALT U/L 10   AST U/L 16   GLUCOSE RANDOM mg/dL 93     Results from last 7 days   Lab Units 09/05/21  1242   INR  0 90                   Imaging: I have reviewed pertinent imaging     VAS lower limb venous duplex study, complete bilateral    (Results Pending)       EKG, Pathology, and Other Studies Reviewed on Admission:   · EKG: Reviewed     Allscripts / Epic Records Reviewed    ** Please Note: This note has been constructed using a voice recognition system   **

## 2021-09-05 NOTE — ED PROVIDER NOTES
History  Chief Complaint   Patient presents with    Leg Swelling     pt left lower leg swelling , had hospital stay last week for bladder tumors     HPI      This is a very pleasant 14-year-old male presents emergency department where he recently admitted to the hospital for acute urinary retention and underwent on the 30th August 2021 at [address]  [facility] by Dr Belkis Monge  Patient noted any have bilateral lower extremity swelling worse on the left than the right  Came to the emergency department for further evaluation  Prior to Admission Medications   Prescriptions Last Dose Informant Patient Reported? Taking?   oxybutynin (DITROPAN) 5 mg tablet   No No   Sig: Take 1 tablet (5 mg total) by mouth 3 (three) times a day as needed (Bladder spasms)   tamsulosin (FLOMAX) 0 4 mg   No No   Sig: Take 1 capsule (0 4 mg total) by mouth daily with dinner      Facility-Administered Medications: None       History reviewed  No pertinent past medical history  Past Surgical History:   Procedure Laterality Date    CYSTOSCOPY N/A 8/30/2021    Procedure: CYSTOSCOPY;  Surgeon: Yao Gould MD;  [facility];  Service: Urology    TRANSURETHRAL RESECTION OF BLADDER TUMOR N/A 8/30/2021    Procedure: TRANSURETHRAL RESECTION OF BLADDER TUMOR (TURBT); Surgeon: Yao Gould MD;  [facility];  Service: Urology       History reviewed  No pertinent family history  I have reviewed and agree with the history as documented  E-Cigarette/Vaping     E-Cigarette/Vaping Substances     Social History     Tobacco Use    Smoking status: Current Every Day Smoker    Smokeless tobacco: Never Used   Substance Use Topics    Alcohol use: Yes    Drug use: Not Currently       Review of Systems   Constitutional: Negative  HENT: Negative  Eyes: Negative  Respiratory: Negative  Negative for chest tightness and shortness of breath  Cardiovascular: Negative  Negative for chest pain  Gastrointestinal: Negative      Endocrine: Negative  Genitourinary: Negative  Musculoskeletal: Negative  Lower extremity L sided pain   Skin: Negative  Allergic/Immunologic: Negative  Neurological: Negative  Hematological: Negative  Psychiatric/Behavioral: Negative  Physical Exam  Physical Exam  Vitals and nursing note reviewed  Constitutional:       Appearance: Normal appearance  He is normal weight  HENT:      Head: Normocephalic and atraumatic  Right Ear: External ear normal       Left Ear: External ear normal       Nose: Nose normal       Mouth/Throat:      Mouth: Mucous membranes are moist       Pharynx: Oropharynx is clear  Eyes:      Extraocular Movements: Extraocular movements intact  Conjunctiva/sclera: Conjunctivae normal       Pupils: Pupils are equal, round, and reactive to light  Cardiovascular:      Rate and Rhythm: Normal rate and regular rhythm  Pulses: Normal pulses  Heart sounds: Normal heart sounds  Pulmonary:      Effort: Pulmonary effort is normal       Breath sounds: Normal breath sounds  Abdominal:      General: Abdomen is flat  Bowel sounds are normal    Musculoskeletal:         General: Swelling and tenderness present  Normal range of motion  Cervical back: Normal range of motion  Right lower leg: Edema present  Left lower leg: Edema present  Skin:     General: Skin is warm  Capillary Refill: Capillary refill takes less than 2 seconds  Neurological:      General: No focal deficit present  Mental Status: He is alert and oriented to person, place, and time  Mental status is at baseline  Psychiatric:         Mood and Affect: Mood normal          Behavior: Behavior normal          Thought Content:  Thought content normal          Judgment: Judgment normal          Vital Signs  ED Triage Vitals [09/05/21 1214]   Temperature Pulse Respirations Blood Pressure SpO2   97 7 °F (36 5 °C) 64 18 (!) 180/77 100 %      Temp src Heart Rate Source Patient Position - Orthostatic VS BP Location FiO2 (%)   -- Monitor -- -- --      Pain Score       --           Vitals:    09/05/21 1214   BP: (!) 180/77   Pulse: 64         Visual Acuity      ED Medications  Medications   heparin (porcine) 25,000 units in 0 45% NaCl 250 mL infusion (premix) (18 Units/kg/hr × 75 kg (Order-Specific) Intravenous New Bag 9/5/21 1551)   oxybutynin (DITROPAN) tablet 5 mg (has no administration in time range)   tamsulosin (FLOMAX) capsule 0 4 mg (has no administration in time range)   nicotine (NICODERM CQ) 21 mg/24 hr TD 24 hr patch 1 patch (has no administration in time range)   Labetalol HCl (NORMODYNE) injection 10 mg (has no administration in time range)   chlorthalidone tablet 25 mg (has no administration in time range)   heparin (porcine) injection 6,000 Units (6,000 Units Intravenous Given 9/5/21 1548)       Diagnostic Studies  Results Reviewed     Procedure Component Value Units Date/Time    Protime-INR [[id number]]  (Normal) Collected: 09/05/21 1242    Lab Status: Final result Specimen: Blood from Arm, Right Updated: 09/05/21 1312     Protime 12 3 seconds      INR 0 90    APTT [[id number]]  (Normal) Collected: 09/05/21 1242    Lab Status: Final result Specimen: Blood from Arm, Right Updated: 09/05/21 1312     PTT 29 seconds     Comprehensive metabolic panel [[id number]] Collected: 09/05/21 1242    Lab Status: Final result Specimen: Blood from Arm, Right Updated: 09/05/21 1304     Sodium 137 mmol/L      Potassium 4 0 mmol/L      Chloride 103 mmol/L      CO2 25 mmol/L      ANION GAP 9 mmol/L      BUN 16 mg/dL      Creatinine 0 91 mg/dL      Glucose 93 mg/dL      Calcium 8 6 mg/dL      AST 16 U/L      ALT 10 U/L      Alkaline Phosphatase 59 U/L      Total Protein 6 4 g/dL      Albumin 3 9 g/dL      Total Bilirubin 0 50 mg/dL      eGFR 95 ml/min/1 73sq m     Narrative:      [place] guidelines for Chronic Kidney Disease (CKD):     Stage 1 with normal or high GFR (GFR > 90 mL/min/1 73 square meters)    Stage 2 Mild CKD (GFR = 60-89 mL/min/1 73 square meters)    Stage 3A Moderate CKD (GFR = 45-59 mL/min/1 73 square meters)    Stage 3B Moderate CKD (GFR = 30-44 mL/min/1 73 square meters)    Stage 4 Severe CKD (GFR = 15-29 mL/min/1 73 square meters)    Stage 5 End Stage CKD (GFR <15 mL/min/1 73 square meters)  Note: GFR calculation is accurate only with a steady state creatinine    CBC and differential [[id number]]  (Abnormal) Collected: 09/05/21 1242    Lab Status: Final result Specimen: Blood from Arm, Right Updated: 09/05/21 1255     WBC 8 70 Thousand/uL      RBC 4 51 Million/uL      Hemoglobin 13 8 g/dL      Hematocrit 40 2 %      MCV 89 fL      MCH 30 6 pg      MCHC 34 3 g/dL      RDW 14 2 %      MPV 7 4 fL      Platelets 512 Thousands/uL      Neutrophils Relative 71 %      Lymphocytes Relative 18 %      Monocytes Relative 8 %      Eosinophils Relative 2 %      Basophils Relative 1 %      Neutrophils Absolute 6 20 Thousands/µL      Lymphocytes Absolute 1 50 Thousands/µL      Monocytes Absolute 0 70 Thousand/µL      Eosinophils Absolute 0 20 Thousand/µL      Basophils Absolute 0 10 Thousands/µL                  VAS lower limb venous duplex study, complete bilateral   Final Result by Alvin Patel MD (09/05 1626)                 Procedures  Procedures         ED Course  ED Course as of Sep 05 1652   Glenn Amaral Sep 05, 2021   1411 Reviewed with the vascular ultrasound technician that the patient has a very large clot burden bilaterally  1422 Call placed to [facility]  1455 Case d/w Dr Jodi Sanders, [facility] attending on call, advised due to the large clot burden and please have the vascular surgery team contact in order to follow-up with the study to determine the patient interventional Radiology  [id number] [place] [address] 43 text to 1796 [facility] [address] @ \[facility]\"": Dr Víctor Jewell  Awaiting call back        [facility] with Dr Víctor Jewell, vascular surgery fellow, reviewed the imaging noted that there is no common femoral involvement but there is bilateral significant distal popliteal clot burden requiring anticoagulation  Advised the reach out to Urology  [address] with urology at [facility], Flores Hoffman, [address], on-call for Urology, felt that the patient should be anticoagulated with a 6 presents over a history of having hematuria, patient is currently not experiencing hematuria located deep the patient here for further evaluation and anticoagulation  MDM  Number of Diagnoses or Management Options  Acute bilateral deep vein thrombosis (DVT) of popliteal veins (HCC)  Diagnosis management comments: This is a very pleasant 59-year-old gentleman presents the emergency department with a recent TURP, developed a left-sided and right-sided lower extremity venous thrombosis acute in nature with large amount of clot burden not extending into the common femoral territory, case discussed with vascular, advised patient to be anticoagulated, history of hematuria as of 28th August 2021, case discussed with Urology dorothea Cevallos advised that be okay to proceed with anticoagulation and observed the urine for development of hematuria  Patient started on heparin  Patient be admitted to the hospital to the hospitalist service  Portions of the record may have been created with voice recognition software  Occasional wrong word or "sound a like" substitutions may have occurred due to the inherent limitations of voice recognition software  Read the chart carefully and recognize, using context, where substitutions have occurred           Amount and/or Complexity of Data Reviewed  Clinical lab tests: ordered and reviewed  Tests in the medicine section of CPT®: ordered and reviewed        Disposition  Final diagnoses:   Acute bilateral deep vein thrombosis (DVT) of popliteal veins ([address] )     Time reflects when diagnosis was documented in both MDM as applicable and the Disposition within this note Time User Action Codes Description Comment    9/5/2021  4:42 PM Dang Johnson Add [[id number] [id number]] Acute bilateral deep vein thrombosis (DVT) of popliteal veins [facility])       ED Disposition     None      Follow-up Information    None         Patient's Medications   Discharge Prescriptions    No medications on file     No discharge procedures on file      PDMP Review     None          ED Provider  Electronically Signed by           Melvin Rosario III, DO  09/05/21 6689

## 2021-09-06 VITALS
OXYGEN SATURATION: 98 % | DIASTOLIC BLOOD PRESSURE: 95 MMHG | SYSTOLIC BLOOD PRESSURE: 146 MMHG | HEIGHT: 70 IN | TEMPERATURE: 96.3 F | WEIGHT: 174.6 LBS | HEART RATE: 64 BPM | BODY MASS INDEX: 25 KG/M2 | RESPIRATION RATE: 18 BRPM

## 2021-09-06 LAB
ANION GAP SERPL CALCULATED.3IONS-SCNC: 7 MMOL/L (ref 4–13)
APTT PPP: 74 SECONDS (ref 23–37)
BASOPHILS # BLD AUTO: 0.1 THOUSANDS/ΜL (ref 0–0.1)
BASOPHILS NFR BLD AUTO: 1 % (ref 0–2)
BUN SERPL-MCNC: 20 MG/DL (ref 7–25)
CALCIUM SERPL-MCNC: 8 MG/DL (ref 8.6–10.5)
CHLORIDE SERPL-SCNC: 106 MMOL/L (ref 98–107)
CO2 SERPL-SCNC: 26 MMOL/L (ref 21–31)
CREAT SERPL-MCNC: 0.84 MG/DL (ref 0.7–1.3)
EOSINOPHIL # BLD AUTO: 0.3 THOUSAND/ΜL (ref 0–0.61)
EOSINOPHIL NFR BLD AUTO: 3 % (ref 0–5)
ERYTHROCYTE [DISTWIDTH] IN BLOOD BY AUTOMATED COUNT: 14 % (ref 11.5–14.5)
GFR SERPL CREATININE-BSD FRML MDRD: 99 ML/MIN/1.73SQ M
GLUCOSE P FAST SERPL-MCNC: 103 MG/DL (ref 65–99)
GLUCOSE SERPL-MCNC: 103 MG/DL (ref 65–99)
HCT VFR BLD AUTO: 37.2 % (ref 42–47)
HGB BLD-MCNC: 12.8 G/DL (ref 14–18)
LYMPHOCYTES # BLD AUTO: 2.2 THOUSANDS/ΜL (ref 0.6–4.47)
LYMPHOCYTES NFR BLD AUTO: 24 % (ref 21–51)
MAGNESIUM SERPL-MCNC: 2.1 MG/DL (ref 1.9–2.7)
MCH RBC QN AUTO: 30.7 PG (ref 26–34)
MCHC RBC AUTO-ENTMCNC: 34.4 G/DL (ref 31–37)
MCV RBC AUTO: 89 FL (ref 81–99)
MONOCYTES # BLD AUTO: 0.8 THOUSAND/ΜL (ref 0.17–1.22)
MONOCYTES NFR BLD AUTO: 9 % (ref 2–12)
NEUTROPHILS # BLD AUTO: 5.7 THOUSANDS/ΜL (ref 1.4–6.5)
NEUTS SEG NFR BLD AUTO: 63 % (ref 42–75)
PHOSPHATE SERPL-MCNC: 4.3 MG/DL (ref 3–5.5)
PLATELET # BLD AUTO: 206 THOUSANDS/UL (ref 149–390)
PMV BLD AUTO: 8.3 FL (ref 8.6–11.7)
POTASSIUM SERPL-SCNC: 4 MMOL/L (ref 3.5–5.5)
RBC # BLD AUTO: 4.16 MILLION/UL (ref 4.3–5.9)
SODIUM SERPL-SCNC: 139 MMOL/L (ref 134–143)
WBC # BLD AUTO: 9.1 THOUSAND/UL (ref 4.8–10.8)

## 2021-09-06 PROCEDURE — 84100 ASSAY OF PHOSPHORUS: CPT | Performed by: INTERNAL MEDICINE

## 2021-09-06 PROCEDURE — 83735 ASSAY OF MAGNESIUM: CPT | Performed by: INTERNAL MEDICINE

## 2021-09-06 PROCEDURE — 85025 COMPLETE CBC W/AUTO DIFF WBC: CPT | Performed by: INTERNAL MEDICINE

## 2021-09-06 PROCEDURE — 80048 BASIC METABOLIC PNL TOTAL CA: CPT | Performed by: INTERNAL MEDICINE

## 2021-09-06 PROCEDURE — 85730 THROMBOPLASTIN TIME PARTIAL: CPT | Performed by: INTERNAL MEDICINE

## 2021-09-06 PROCEDURE — 99217 PR OBSERVATION CARE DISCHARGE MANAGEMENT: CPT | Performed by: PHYSICIAN ASSISTANT

## 2021-09-06 RX ORDER — CHLORTHALIDONE 25 MG/1
25 TABLET ORAL DAILY
Qty: 30 TABLET | Refills: 0 | Status: SHIPPED | OUTPATIENT
Start: 2021-09-07 | End: 2021-11-30

## 2021-09-06 RX ADMIN — NICOTINE 1 PATCH: 21 PATCH, EXTENDED RELEASE TRANSDERMAL at 09:26

## 2021-09-06 RX ADMIN — CHLORTHALIDONE 25 MG: 25 TABLET ORAL at 09:26

## 2021-09-06 RX ADMIN — HEPARIN SODIUM 18 UNITS/KG/HR: 10000 INJECTION, SOLUTION INTRAVENOUS at 09:33

## 2021-09-06 RX ADMIN — APIXABAN 10 MG: 5 TABLET, FILM COATED ORAL at 10:43

## 2021-09-06 NOTE — NURSING NOTE
Patient discharged home  IV removed  Verbalizes understanding of discharge instructions  Patient has all belongings   Escorted out by [address]
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
## 2021-09-06 NOTE — ASSESSMENT & PLAN NOTE
Secondary to bladder mass which is now status post biopsy  Patient has extensive smoking history  Pedro catheter in place as TURBT was complicated by bladder perforation  Hemoglobin stable    · Maintain Pedro catheter  · Trend CBC  · Monitor for signs of bleeding  · Outpatient follow-up with Urology scheduled 9/7 for Pedro catheter removal

## 2021-09-06 NOTE — DISCHARGE SUMMARY
[facility]  Discharge- [place] 1965, 54 y o  male MRN: [id number]  Unit/Bed#: -02 Encounter: [id number]  Primary Care Provider: No primary care provider on file  Date and time admitted to hospital: 9/5/2021 12:10 PM    * DVT (deep venous thrombosis) [facility])  Assessment & Plan  Patient presents following hospitalization with right lower extremity pain and swelling  Lower extremity Dopplers with bilateral extensive deep venous thromboses  Initiated on heparin drip in the ED  No concerning signs or symptoms for pulmonary embolism  Likely provoked in the setting of recent immobilization during hospitalization and likely not receiving DVT prophylaxis in the setting of gross hematuria  Hemoglobin stable  · Was therapeutic on his heparin infusion, transition to p o  Eliquis for 3 months  · Outpatient follow-up with PCP, patient was encouraged to follow up and instructed on the importance of continue care with his primary care provider  Gross hematuria  Assessment & Plan  Secondary to bladder mass which is now status post biopsy  Patient has extensive smoking history  Pedro catheter in place as TURBT was complicated by bladder perforation  Hemoglobin stable  · Maintain Pedro catheter  · Trend CBC  · Monitor for signs of bleeding  · Outpatient follow-up with Urology scheduled 9/7 for Pedro catheter removal    Hypertensive urgency  Assessment & Plan  SBP 180s on presentation  · Patient was started on chlorthalidone 25 mg PO daily    Discharging Physician / Practitioner: Kiarra Ceballos PA-C  PCP: No primary care provider on file    Admission Date:   Admission Orders (From admission, onward)     Ordered        09/05/21 1621  Place in Observation  Once                   Discharge Date: 09/06/21    Medical Problems     Resolved Problems  Date Reviewed: 9/5/2021    None                Consultations During Hospital Stay:  · none    Procedures Performed: · none    Significant Findings / Test Results:   · RLE duplex: "There is evidence of acute, non-occlusive deep vein thrombosis of the distal popliteal, proximal, mid and distal peroneal and posterior tibial, and gastrocnemius veins "  · LLE duplex: "There is evidence of acute, non-occlusive deep vein thrombosis of the distal popliteal and proximal to mid peroneal and posterior tibial veins "    Incidental Findings:   · none     Test Results Pending at Discharge (will require follow up):   · none     Outpatient Tests Requested:  · none    Complications:  none    Reason for Admission: DVT    Hospital Course:     Julianne Gutierrez is a 54 y o  male patient who originally presented to the hospital on 9/5/2021 due to calf pain and swelling  Recent hospitalization secondary to gross hematuria secondary to bladder mass, undergoing TURBT which was complicated by bladder perforation  His hematuria had resolved he was discharged with a Pedro catheter in place and had outpatient follow-up on 09/07 for removal   Prior to admission he noticed his legs were more swollen and painful than usual, found to have DVT on lower extremity Dopplers as above  Urology was contacted by the ED, who deemed safe for anticoagulation as his hematuria had resolved  He had no recurrence of hematuria throughout his stay  He was started on IV heparin infusion  Case was discussed with vascular surgery, who stated that no thrombectomy was indicated  The patient was therapeutic on his IV heparin infusion  On 09/06 the patient desired discharge and had been ambulating around the room with significant improvement in pain  He was discharged with p o  Eliquis for 3 months, and he was encouraged to follow up with his primary care provider  I stressed the importance of continued care with his PCP and he reported that he will call to make an appointment this week  Please see above list of diagnoses and related plan for additional information  Condition at Discharge: stable     Discharge Day Visit / Exam:     Subjective:  Patient examined at bedside, has been ambulating around the room and hallways  Reports that he feels slight muscle pain in his legs and is significantly improved  No hemodynamic compromise  Discussed with patient return to ER criteria as well as to avoid strenuous exercise as well as stressed the importance of follow-up with primary care provider  He is agreeable to this, and insistently says that he wants to be discharged today, which I feel would be appropriate with proper follow-up  Reports that he is going to get his Pedro catheter removed tomorrow by Urology  Vitals: Blood Pressure: 146/95 (09/06/21 0636)  Pulse: 64 (09/06/21 0636)  Temperature: (!) 96 3 °F (35 7 °C) (09/06/21 0636)  Temp Source: Tympanic (09/06/21 0636)  Respirations: 18 (09/06/21 0636)  Height: 5' 10" (177 8 cm) (09/05/21 1714)  Weight - Scale: 79 2 kg (174 lb 9 7 oz) (09/05/21 1714)  SpO2: 98 % (09/06/21 0636)  Exam:   Physical Exam  Vitals and nursing note reviewed  Constitutional:       General: He is not in acute distress  Appearance: Normal appearance  HENT:      Head: Normocephalic  Mouth/Throat:      Mouth: Mucous membranes are moist    Eyes:      Pupils: Pupils are equal, round, and reactive to light  Cardiovascular:      Rate and Rhythm: Normal rate and regular rhythm  Heart sounds: No murmur heard  Pulmonary:      Effort: Pulmonary effort is normal  No respiratory distress  Breath sounds: Normal breath sounds  No wheezing, rhonchi or rales  Abdominal:      General: Bowel sounds are normal  There is no distension  Palpations: Abdomen is soft  Tenderness: There is no abdominal tenderness  There is no guarding  Musculoskeletal:         General: No deformity  Cervical back: Normal range of motion  Right lower leg: No edema  Left lower leg: No edema     Skin:     Capillary Refill: Capillary refill takes less than 2 seconds  Neurological:      General: No focal deficit present  Mental Status: He is alert and oriented to person, place, and time  Mental status is at baseline  Discussion with Family: patient declined family update    Discharge instructions/Information to patient and family:   See after visit summary for information provided to patient and family  Provisions for Follow-Up Care:  See after visit summary for information related to follow-up care and any pertinent home health orders  Disposition:     Home    For Discharges to [facility] SNF:   · Not Applicable to this Patient - Not Applicable to this Patient    Planned Readmission: no     Discharge Statement:  I spent 45 minutes discharging the patient  This time was spent on the day of discharge  I had direct contact with the patient on the day of discharge  Greater than 50% of the total time was spent examining patient, answering all patient questions, arranging and discussing plan of care with patient as well as directly providing post-discharge instructions  Additional time then spent on discharge activities  Discharge Medications:  See after visit summary for reconciled discharge medications provided to patient and family        ** Please Note: This note has been constructed using a voice recognition system **

## 2021-09-06 NOTE — NURSING NOTE
Pt 3rd PTT 74  No change in heparin drip rate/concentration  Two therapeutic levels achieved in a row  Will continue to monitor

## 2021-09-06 NOTE — PLAN OF CARE
Problem: PAIN - ADULT  Goal: Verbalizes/displays adequate comfort level or baseline comfort level  Description: Interventions:  - Encourage patient to monitor pain and request assistance  - Assess pain using appropriate pain scale  - Administer analgesics based on type and severity of pain and evaluate response  - Implement non-pharmacological measures as appropriate and evaluate response  - Consider cultural and social influences on pain and pain management  - Notify physician/advanced practitioner if interventions unsuccessful or patient reports new pain  Outcome: Progressing     Problem: HEMATOLOGIC - ADULT  Goal: Maintains hematologic stability  Description: INTERVENTIONS  - Assess for signs and symptoms of bleeding or hemorrhage  - Monitor labs  - Administer supportive blood products/factors as ordered and appropriate  Outcome: Progressing

## 2021-09-07 ENCOUNTER — TELEPHONE (OUTPATIENT)
Dept: UROLOGY | Facility: CLINIC | Age: 56
End: 2021-09-07

## 2021-09-07 ENCOUNTER — PROCEDURE VISIT (OUTPATIENT)
Dept: UROLOGY | Facility: CLINIC | Age: 56
End: 2021-09-07
Payer: COMMERCIAL

## 2021-09-07 VITALS
HEIGHT: 70 IN | WEIGHT: 182 LBS | RESPIRATION RATE: 20 BRPM | SYSTOLIC BLOOD PRESSURE: 160 MMHG | DIASTOLIC BLOOD PRESSURE: 100 MMHG | BODY MASS INDEX: 26.05 KG/M2 | HEART RATE: 88 BPM

## 2021-09-07 DIAGNOSIS — C67.9 MALIGNANT NEOPLASM OF URINARY BLADDER, UNSPECIFIED SITE (HCC): Primary | ICD-10-CM

## 2021-09-07 PROCEDURE — 3008F BODY MASS INDEX DOCD: CPT

## 2021-09-07 PROCEDURE — 99211 OFF/OP EST MAY X REQ PHY/QHP: CPT

## 2021-09-07 PROCEDURE — 1111F DSCHRG MED/CURRENT MED MERGE: CPT

## 2021-09-07 NOTE — TELEPHONE ENCOUNTER
Patient seen today for moreland removal  Patient needs to be scheduled for cystoscopy, bladder biopsies and fulguration  Please note patient admitted to [facility] on 09/05/2021 for bilateral DVT  Patient discharged home on Eliquis  I discussed with patient our surgery scheduler, Kristopher Lima, will reach out to him about a date

## 2021-09-07 NOTE — PROGRESS NOTES
I supervised the Advanced Practitioner  I reviewed the Advanced Practitioner note and agree      Sriram Cain MD 09/07/21

## 2021-09-07 NOTE — PROGRESS NOTES
9/7/2021    Shona Leyden is a 54 y o  male  [id number]    Diagnosis:  Chief Complaint     Bladder Cancer; Moreland Removal          Patient presents for moreland removal s/p TURBT on 08/30/2021 with Dr Johnson Ree:  Patient needs to be scheduled for bladder biopsies and fulguration  Will reach out to surgery scheduler to contact patient    Procedure: Moreland removed after deflation of intact balloon without incident  Patient tolerated procedure well      Vitals:    09/07/21 0905   BP: 160/100   Pulse: 88   Resp: 20   Weight: 82 6 kg (182 lb)   Height: 5' 10" (1 778 m)         Ramon Main, RN

## 2021-09-07 NOTE — TELEPHONE ENCOUNTER
Called patient to schedule procedure, but was unable to contact him and voicemail has not been setup  Will try back

## 2021-09-08 NOTE — TELEPHONE ENCOUNTER
Attempted to contact patient again for the 4th time, but no answer and his voice mail is not set up  I will try again next week

## 2021-09-14 NOTE — TELEPHONE ENCOUNTER
Attempted to contact patient again for the 5th time, but no answer and his voice mail is not set up  I will try again next week

## 2021-09-16 NOTE — TELEPHONE ENCOUNTER
I have been unable to reach patient to schedule and have mailed a letter to patient to contact me to schedule   I have also cancelled case request

## 2021-09-22 ENCOUNTER — PATIENT OUTREACH (OUTPATIENT)
Dept: UROLOGY | Facility: CLINIC | Age: 56
End: 2021-09-22

## 2021-09-22 NOTE — PROGRESS NOTES
Shirley Ribeiro called back  She stated that Mega Sharma is aware that he needed another procedure  He is back to work and cannot take calls  Shirley Ribeiro requested that the surgery scheduler contact her

## 2021-09-22 NOTE — PROGRESS NOTES
Michelle Alonzo (Urology) has been attempting to contact Sun[facility] Jaime to schedule a procedure  Dr Kurtis Lopez surgery scheduler has not been able to leave a VM at [facility] number  She has sent a letter  Amber Byers (wife) who is listed on his communication consent  L/M [phone]  Identified myself, left my direct extension, and requested a call back  Made her aware that Urology has been trying to contact [facility] Ja[facility]

## 2021-09-23 ENCOUNTER — PREP FOR PROCEDURE (OUTPATIENT)
Dept: UROLOGY | Facility: CLINIC | Age: 56
End: 2021-09-23

## 2021-09-23 DIAGNOSIS — C67.0 MALIGNANT NEOPLASM OF TRIGONE OF URINARY BLADDER (HCC): Primary | ICD-10-CM

## 2021-09-23 NOTE — PROGRESS NOTES
Called patient wife and left a voice message for her to return my call to schedule her husbands sx   [phone]

## 2021-09-23 NOTE — PROGRESS NOTES
Called and spoke with patient wife to schedule sx  Patient informed of all PAT's needed prior to sx and advised to stop any anticoagulant medication including Multi-vitamins, Fish oil, Krill oil and NSAID, Patient has also been inform that the hospital will call the day before sx with arrival time and procedure time   Patient also informed to have a  bring them to and from hospital     Sx Date:11/01  Location: [facility]   H&P Date:10/22 with/ pcp  Consent:on admit  Pre-cert: E-mail will be sent to [facility]

## 2021-09-27 NOTE — TELEPHONE ENCOUNTER
Spoke with patient Wife to schedule sx  Patient informed of all PAT's needed prior to sx and advised to stop any anticoagulant medication including Multi-vitamins, Fish oil, Krill oil and NSAID, Patient has also been inform that the hospital will call the day before sx with arrival time and procedure time  Patient also informed to have a  bring them to and from hospital     Sx Date:11/01  Location: [facility]   Clearance Date:10/22 with/ pcp  Consent:on admit  Pre-cert: message will be sent to [facility] 30 days prior to surgery

## 2021-10-14 ENCOUNTER — TELEPHONE (OUTPATIENT)
Dept: INTERNAL MEDICINE CLINIC | Facility: CLINIC | Age: 56
End: 2021-10-14

## 2021-10-18 ENCOUNTER — TELEPHONE (OUTPATIENT)
Dept: INTERNAL MEDICINE CLINIC | Facility: CLINIC | Age: 56
End: 2021-10-18

## 2021-10-20 NOTE — TELEPHONE ENCOUNTER
Called wife Nanette Shipman back and we rescheduled sx date to 12/06, we also called Dr John Mcnally office and spoke with Zully Murphy who scheduled patient for established care Appointment on 11/04 at 8:30 am and medical Clearance for 11/10/2021 at 9:15 am

## 2021-10-20 NOTE — TELEPHONE ENCOUNTER
Patients wife called  She states patient has not seen Dr Muriel Araujo in 3 to 5 years and the pre-op clearance appt has been cancelled because he is no longer a current patient  Please call wife to discuss patients upcoming procedure that is scheduled for November

## 2021-10-30 PROBLEM — N39.0 URINARY TRACT INFECTION: Status: RESOLVED | Noted: 2021-08-29 | Resolved: 2021-10-30

## 2021-10-30 PROBLEM — R31.0 GROSS HEMATURIA: Status: RESOLVED | Noted: 2021-08-29 | Resolved: 2021-10-30

## 2021-10-30 PROBLEM — R00.0 TACHYCARDIA: Status: RESOLVED | Noted: 2021-08-29 | Resolved: 2021-10-30

## 2021-10-30 PROBLEM — R33.8 ACUTE URINARY RETENTION: Status: RESOLVED | Noted: 2021-08-29 | Resolved: 2021-10-30

## 2021-10-30 PROBLEM — R65.10 SIRS OF NON-INFECTIOUS ORIGIN W/O ACUTE ORGAN DYSFUNCTION (HCC): Status: RESOLVED | Noted: 2021-08-29 | Resolved: 2021-10-30

## 2021-10-30 PROBLEM — R03.0 ELEVATED BLOOD PRESSURE READING: Status: RESOLVED | Noted: 2021-08-29 | Resolved: 2021-10-30

## 2021-11-04 ENCOUNTER — OFFICE VISIT (OUTPATIENT)
Dept: INTERNAL MEDICINE CLINIC | Facility: CLINIC | Age: 56
End: 2021-11-04
Payer: COMMERCIAL

## 2021-11-04 ENCOUNTER — APPOINTMENT (OUTPATIENT)
Dept: LAB | Facility: CLINIC | Age: 56
End: 2021-11-04
Payer: COMMERCIAL

## 2021-11-04 VITALS
SYSTOLIC BLOOD PRESSURE: 162 MMHG | WEIGHT: 189.8 LBS | RESPIRATION RATE: 14 BRPM | DIASTOLIC BLOOD PRESSURE: 104 MMHG | HEIGHT: 70 IN | TEMPERATURE: 98.8 F | HEART RATE: 70 BPM | OXYGEN SATURATION: 98 % | BODY MASS INDEX: 27.17 KG/M2

## 2021-11-04 DIAGNOSIS — Z13.1 SCREENING FOR DIABETES MELLITUS (DM): ICD-10-CM

## 2021-11-04 DIAGNOSIS — F17.200 SMOKING: ICD-10-CM

## 2021-11-04 DIAGNOSIS — C67.3 MALIGNANT NEOPLASM OF ANTERIOR WALL OF BLADDER (HCC): ICD-10-CM

## 2021-11-04 DIAGNOSIS — I82.4Z3 ACUTE DEEP VEIN THROMBOSIS (DVT) OF DISTAL VEIN OF BOTH LOWER EXTREMITIES (HCC): ICD-10-CM

## 2021-11-04 DIAGNOSIS — Z12.5 SCREENING FOR PROSTATE CANCER: ICD-10-CM

## 2021-11-04 DIAGNOSIS — Z11.59 NEED FOR HEPATITIS C SCREENING TEST: ICD-10-CM

## 2021-11-04 DIAGNOSIS — Z13.29 SCREENING FOR THYROID DISORDER: ICD-10-CM

## 2021-11-04 DIAGNOSIS — Z11.4 SCREENING FOR HIV (HUMAN IMMUNODEFICIENCY VIRUS): ICD-10-CM

## 2021-11-04 DIAGNOSIS — Z13.220 SCREENING FOR LIPID DISORDERS: ICD-10-CM

## 2021-11-04 DIAGNOSIS — Z13.31 NEGATIVE DEPRESSION SCREENING: ICD-10-CM

## 2021-11-04 DIAGNOSIS — Z79.01 CHRONIC ANTICOAGULATION: ICD-10-CM

## 2021-11-04 DIAGNOSIS — Z12.11 SCREEN FOR COLON CANCER: ICD-10-CM

## 2021-11-04 DIAGNOSIS — I10 PRIMARY HYPERTENSION: Primary | ICD-10-CM

## 2021-11-04 DIAGNOSIS — N32.89 BLADDER MASS: ICD-10-CM

## 2021-11-04 DIAGNOSIS — I82.433 ACUTE BILATERAL DEEP VEIN THROMBOSIS (DVT) OF POPLITEAL VEINS (HCC): ICD-10-CM

## 2021-11-04 DIAGNOSIS — R25.2 LEG CRAMPS: ICD-10-CM

## 2021-11-04 DIAGNOSIS — Z13.0 SCREENING FOR DEFICIENCY ANEMIA: ICD-10-CM

## 2021-11-04 DIAGNOSIS — I10 PRIMARY HYPERTENSION: ICD-10-CM

## 2021-11-04 PROBLEM — F12.10 MILD TETRAHYDROCANNABINOL (THC) ABUSE: Status: ACTIVE | Noted: 2021-11-04

## 2021-11-04 PROBLEM — I16.0 HYPERTENSIVE URGENCY: Status: RESOLVED | Noted: 2021-08-29 | Resolved: 2021-11-04

## 2021-11-04 LAB
ALBUMIN SERPL BCP-MCNC: 4 G/DL (ref 3.5–5)
ALP SERPL-CCNC: 73 U/L (ref 46–116)
ALT SERPL W P-5'-P-CCNC: 30 U/L (ref 12–78)
ANION GAP SERPL CALCULATED.3IONS-SCNC: 2 MMOL/L (ref 4–13)
APTT PPP: 28 SECONDS (ref 23–37)
AST SERPL W P-5'-P-CCNC: 21 U/L (ref 5–45)
BASOPHILS # BLD AUTO: 0.13 THOUSANDS/ΜL (ref 0–0.1)
BASOPHILS NFR BLD AUTO: 2 % (ref 0–1)
BILIRUB SERPL-MCNC: 0.56 MG/DL (ref 0.2–1)
BUN SERPL-MCNC: 12 MG/DL (ref 5–25)
CALCIUM SERPL-MCNC: 8.9 MG/DL (ref 8.3–10.1)
CHLORIDE SERPL-SCNC: 105 MMOL/L (ref 100–108)
CHOLEST SERPL-MCNC: 216 MG/DL (ref 50–200)
CO2 SERPL-SCNC: 29 MMOL/L (ref 21–32)
CREAT SERPL-MCNC: 0.91 MG/DL (ref 0.6–1.3)
CREAT UR-MCNC: 53.1 MG/DL
EOSINOPHIL # BLD AUTO: 0.13 THOUSAND/ΜL (ref 0–0.61)
EOSINOPHIL NFR BLD AUTO: 2 % (ref 0–6)
ERYTHROCYTE [DISTWIDTH] IN BLOOD BY AUTOMATED COUNT: 13.6 % (ref 11.6–15.1)
GFR SERPL CREATININE-BSD FRML MDRD: 95 ML/MIN/1.73SQ M
GLUCOSE P FAST SERPL-MCNC: 90 MG/DL (ref 65–99)
HCT VFR BLD AUTO: 49.6 % (ref 36.5–49.3)
HCV AB SER QL: NORMAL
HDLC SERPL-MCNC: 74 MG/DL
HGB BLD-MCNC: 16.6 G/DL (ref 12–17)
IMM GRANULOCYTES # BLD AUTO: 0.04 THOUSAND/UL (ref 0–0.2)
IMM GRANULOCYTES NFR BLD AUTO: 1 % (ref 0–2)
INR PPP: 0.87 (ref 0.84–1.19)
LDLC SERPL CALC-MCNC: 132 MG/DL (ref 0–100)
LYMPHOCYTES # BLD AUTO: 1.68 THOUSANDS/ΜL (ref 0.6–4.47)
LYMPHOCYTES NFR BLD AUTO: 24 % (ref 14–44)
MAGNESIUM SERPL-MCNC: 2.2 MG/DL (ref 1.6–2.6)
MCH RBC QN AUTO: 30 PG (ref 26.8–34.3)
MCHC RBC AUTO-ENTMCNC: 33.5 G/DL (ref 31.4–37.4)
MCV RBC AUTO: 90 FL (ref 82–98)
MICROALBUMIN UR-MCNC: 10.6 MG/L (ref 0–20)
MICROALBUMIN/CREAT 24H UR: 20 MG/G CREATININE (ref 0–30)
MONOCYTES # BLD AUTO: 0.48 THOUSAND/ΜL (ref 0.17–1.22)
MONOCYTES NFR BLD AUTO: 7 % (ref 4–12)
NEUTROPHILS # BLD AUTO: 4.58 THOUSANDS/ΜL (ref 1.85–7.62)
NEUTS SEG NFR BLD AUTO: 64 % (ref 43–75)
NRBC BLD AUTO-RTO: 0 /100 WBCS
PLATELET # BLD AUTO: 258 THOUSANDS/UL (ref 149–390)
PMV BLD AUTO: 9.6 FL (ref 8.9–12.7)
POTASSIUM SERPL-SCNC: 4.6 MMOL/L (ref 3.5–5.3)
PROT SERPL-MCNC: 7.6 G/DL (ref 6.4–8.2)
PROTHROMBIN TIME: 11.6 SECONDS (ref 11.6–14.5)
PSA SERPL-MCNC: 2.6 NG/ML (ref 0–4)
RBC # BLD AUTO: 5.53 MILLION/UL (ref 3.88–5.62)
SODIUM SERPL-SCNC: 136 MMOL/L (ref 136–145)
TRIGL SERPL-MCNC: 52 MG/DL
TSH SERPL DL<=0.05 MIU/L-ACNC: 1.93 UIU/ML (ref 0.36–3.74)
WBC # BLD AUTO: 7.04 THOUSAND/UL (ref 4.31–10.16)

## 2021-11-04 PROCEDURE — 85303 CLOT INHIBIT PROT C ACTIVITY: CPT

## 2021-11-04 PROCEDURE — G0103 PSA SCREENING: HCPCS

## 2021-11-04 PROCEDURE — 85610 PROTHROMBIN TIME: CPT

## 2021-11-04 PROCEDURE — 99204 OFFICE O/P NEW MOD 45 MIN: CPT | Performed by: INTERNAL MEDICINE

## 2021-11-04 PROCEDURE — 80053 COMPREHEN METABOLIC PANEL: CPT

## 2021-11-04 PROCEDURE — 83735 ASSAY OF MAGNESIUM: CPT

## 2021-11-04 PROCEDURE — 36415 COLL VENOUS BLD VENIPUNCTURE: CPT

## 2021-11-04 PROCEDURE — 85025 COMPLETE CBC W/AUTO DIFF WBC: CPT

## 2021-11-04 PROCEDURE — 84443 ASSAY THYROID STIM HORMONE: CPT

## 2021-11-04 PROCEDURE — 85300 ANTITHROMBIN III ACTIVITY: CPT

## 2021-11-04 PROCEDURE — 85730 THROMBOPLASTIN TIME PARTIAL: CPT

## 2021-11-04 PROCEDURE — 82043 UR ALBUMIN QUANTITATIVE: CPT | Performed by: INTERNAL MEDICINE

## 2021-11-04 PROCEDURE — 82570 ASSAY OF URINE CREATININE: CPT | Performed by: INTERNAL MEDICINE

## 2021-11-04 PROCEDURE — 85306 CLOT INHIBIT PROT S FREE: CPT

## 2021-11-04 PROCEDURE — 86803 HEPATITIS C AB TEST: CPT

## 2021-11-04 PROCEDURE — 3725F SCREEN DEPRESSION PERFORMED: CPT | Performed by: INTERNAL MEDICINE

## 2021-11-04 PROCEDURE — 81291 MTHFR GENE: CPT

## 2021-11-04 PROCEDURE — 86147 CARDIOLIPIN ANTIBODY EA IG: CPT

## 2021-11-04 PROCEDURE — 80061 LIPID PANEL: CPT

## 2021-11-04 PROCEDURE — 81241 F5 GENE: CPT

## 2021-11-04 PROCEDURE — 87389 HIV-1 AG W/HIV-1&-2 AB AG IA: CPT

## 2021-11-04 RX ORDER — METOPROLOL SUCCINATE 50 MG/1
50 TABLET, EXTENDED RELEASE ORAL DAILY
Qty: 90 TABLET | Refills: 3 | Status: SHIPPED | OUTPATIENT
Start: 2021-11-04

## 2021-11-05 LAB
CARDIOLIPIN IGA SER IA-ACNC: 1.6
CARDIOLIPIN IGG SER IA-ACNC: 0.7
CARDIOLIPIN IGM SER IA-ACNC: 3.1
DEPRECATED AT III PPP: 102 % OF NORMAL (ref 92–136)
HIV 1+2 AB+HIV1 P24 AG SERPL QL IA: NORMAL

## 2021-11-09 LAB — MISCELLANEOUS LAB TEST RESULT: NORMAL

## 2021-11-10 ENCOUNTER — CONSULT (OUTPATIENT)
Dept: INTERNAL MEDICINE CLINIC | Facility: CLINIC | Age: 56
End: 2021-11-10
Payer: COMMERCIAL

## 2021-11-10 VITALS
SYSTOLIC BLOOD PRESSURE: 120 MMHG | TEMPERATURE: 97.5 F | HEIGHT: 70 IN | HEART RATE: 64 BPM | OXYGEN SATURATION: 98 % | DIASTOLIC BLOOD PRESSURE: 80 MMHG | BODY MASS INDEX: 28.35 KG/M2 | WEIGHT: 198 LBS

## 2021-11-10 DIAGNOSIS — N32.89 BLADDER MASS: ICD-10-CM

## 2021-11-10 DIAGNOSIS — C67.3 MALIGNANT NEOPLASM OF ANTERIOR WALL OF BLADDER (HCC): ICD-10-CM

## 2021-11-10 DIAGNOSIS — Z01.818 VISIT FOR PRE-OPERATIVE EXAMINATION: Primary | ICD-10-CM

## 2021-11-10 DIAGNOSIS — Z79.01 CHRONIC ANTICOAGULATION: ICD-10-CM

## 2021-11-10 DIAGNOSIS — I82.4Z3 ACUTE DEEP VEIN THROMBOSIS (DVT) OF DISTAL VEIN OF BOTH LOWER EXTREMITIES (HCC): ICD-10-CM

## 2021-11-10 DIAGNOSIS — I10 PRIMARY HYPERTENSION: ICD-10-CM

## 2021-11-10 LAB
F5 GENE MUT ANL BLD/T: NORMAL
MTHFR GENE MUT ANL BLD/T: NORMAL

## 2021-11-10 PROCEDURE — 99244 OFF/OP CNSLTJ NEW/EST MOD 40: CPT | Performed by: INTERNAL MEDICINE

## 2021-11-10 PROCEDURE — 3008F BODY MASS INDEX DOCD: CPT | Performed by: INTERNAL MEDICINE

## 2021-11-10 PROCEDURE — 4004F PT TOBACCO SCREEN RCVD TLK: CPT | Performed by: INTERNAL MEDICINE

## 2021-11-13 ENCOUNTER — TELEPHONE (OUTPATIENT)
Dept: OTHER | Facility: OTHER | Age: 56
End: 2021-11-13

## 2021-11-17 ENCOUNTER — TELEPHONE (OUTPATIENT)
Dept: INTERNAL MEDICINE CLINIC | Facility: CLINIC | Age: 56
End: 2021-11-17

## 2021-11-17 DIAGNOSIS — R79.9 ABNORMAL BLOOD CHEMISTRY LEVEL: Primary | ICD-10-CM

## 2021-11-19 ENCOUNTER — APPOINTMENT (OUTPATIENT)
Dept: LAB | Facility: CLINIC | Age: 56
End: 2021-11-19
Payer: COMMERCIAL

## 2021-11-19 DIAGNOSIS — R79.9 ABNORMAL BLOOD CHEMISTRY LEVEL: ICD-10-CM

## 2021-11-19 LAB
FOLATE SERPL-MCNC: 6.5 NG/ML (ref 3.1–17.5)
HCYS SERPL-SCNC: 12.5 UMOL/L (ref 5.3–14.2)
VIT B12 SERPL-MCNC: 542 PG/ML (ref 100–900)

## 2021-11-19 PROCEDURE — 82746 ASSAY OF FOLIC ACID SERUM: CPT

## 2021-11-19 PROCEDURE — 36415 COLL VENOUS BLD VENIPUNCTURE: CPT

## 2021-11-19 PROCEDURE — 82607 VITAMIN B-12: CPT

## 2021-11-19 PROCEDURE — 83090 ASSAY OF HOMOCYSTEINE: CPT

## 2021-11-23 ENCOUNTER — TELEPHONE (OUTPATIENT)
Dept: INTERNAL MEDICINE CLINIC | Facility: CLINIC | Age: 56
End: 2021-11-23

## 2021-11-29 ENCOUNTER — APPOINTMENT (OUTPATIENT)
Dept: LAB | Facility: CLINIC | Age: 56
End: 2021-11-29
Payer: COMMERCIAL

## 2021-11-29 PROCEDURE — 87086 URINE CULTURE/COLONY COUNT: CPT

## 2021-12-01 ENCOUNTER — HOSPITAL ENCOUNTER (OUTPATIENT)
Dept: CT IMAGING | Facility: HOSPITAL | Age: 56
Discharge: HOME | End: 2021-12-01
Attending: UROLOGY
Payer: COMMERCIAL

## 2021-12-01 DIAGNOSIS — C67.3 MALIGNANT NEOPLASM OF ANTERIOR WALL OF URINARY BLADDER (HCC): ICD-10-CM

## 2021-12-01 DIAGNOSIS — Z91.89 PULMONARY NODULE LESS THAN 1 CM IN DIAMETER WITH MODERATE TO HIGH RISK FOR MALIGNANT NEOPLASM: ICD-10-CM

## 2021-12-01 DIAGNOSIS — C67.0 MALIGNANT NEOPLASM OF TRIGONE OF URINARY BLADDER (HCC): ICD-10-CM

## 2021-12-01 DIAGNOSIS — R91.1 PULMONARY NODULE LESS THAN 1 CM IN DIAMETER WITH MODERATE TO HIGH RISK FOR MALIGNANT NEOPLASM: ICD-10-CM

## 2021-12-01 LAB — BACTERIA UR CULT: NORMAL

## 2021-12-01 PROCEDURE — 74176 CT ABD & PELVIS W/O CONTRAST: CPT

## 2021-12-01 PROCEDURE — G1004 CDSM NDSC: HCPCS

## 2021-12-03 ENCOUNTER — TELEPHONE (OUTPATIENT)
Dept: OTHER | Facility: OTHER | Age: 56
End: 2021-12-03

## 2021-12-05 ENCOUNTER — ANESTHESIA EVENT (OUTPATIENT)
Dept: PERIOP | Facility: HOSPITAL | Age: 56
End: 2021-12-05
Payer: COMMERCIAL

## 2021-12-06 ENCOUNTER — HOSPITAL ENCOUNTER (OUTPATIENT)
Facility: HOSPITAL | Age: 56
Setting detail: OUTPATIENT SURGERY
Discharge: HOME | End: 2021-12-06
Attending: UROLOGY | Admitting: UROLOGY
Payer: COMMERCIAL

## 2021-12-06 ENCOUNTER — ANESTHESIA (OUTPATIENT)
Dept: PERIOP | Facility: HOSPITAL | Age: 56
End: 2021-12-06
Payer: COMMERCIAL

## 2021-12-06 VITALS
SYSTOLIC BLOOD PRESSURE: 154 MMHG | OXYGEN SATURATION: 95 % | RESPIRATION RATE: 18 BRPM | HEIGHT: 70 IN | DIASTOLIC BLOOD PRESSURE: 76 MMHG | HEART RATE: 60 BPM | BODY MASS INDEX: 28.41 KG/M2 | TEMPERATURE: 97.9 F

## 2021-12-06 DIAGNOSIS — C67.0 MALIGNANT NEOPLASM OF TRIGONE OF URINARY BLADDER (HCC): ICD-10-CM

## 2021-12-06 DIAGNOSIS — C67.2 MALIGNANT NEOPLASM OF LATERAL WALL OF URINARY BLADDER (HCC): Primary | ICD-10-CM

## 2021-12-06 PROCEDURE — 52204 CYSTOSCOPY W/BIOPSY(S): CPT | Performed by: UROLOGY

## 2021-12-06 PROCEDURE — NC001 PR NO CHARGE: Performed by: UROLOGY

## 2021-12-06 PROCEDURE — 88307 TISSUE EXAM BY PATHOLOGIST: CPT | Performed by: PATHOLOGY

## 2021-12-06 RX ORDER — HYDROMORPHONE HCL IN WATER/PF 6 MG/30 ML
0.2 PATIENT CONTROLLED ANALGESIA SYRINGE INTRAVENOUS
Status: DISCONTINUED | OUTPATIENT
Start: 2021-12-06 | End: 2021-12-06 | Stop reason: HOSPADM

## 2021-12-06 RX ORDER — LIDOCAINE HYDROCHLORIDE 10 MG/ML
INJECTION, SOLUTION EPIDURAL; INFILTRATION; INTRACAUDAL; PERINEURAL AS NEEDED
Status: DISCONTINUED | OUTPATIENT
Start: 2021-12-06 | End: 2021-12-06

## 2021-12-06 RX ORDER — SODIUM CHLORIDE, SODIUM LACTATE, POTASSIUM CHLORIDE, CALCIUM CHLORIDE 600; 310; 30; 20 MG/100ML; MG/100ML; MG/100ML; MG/100ML
125 INJECTION, SOLUTION INTRAVENOUS CONTINUOUS
Status: DISCONTINUED | OUTPATIENT
Start: 2021-12-06 | End: 2021-12-06 | Stop reason: HOSPADM

## 2021-12-06 RX ORDER — ONDANSETRON 2 MG/ML
4 INJECTION INTRAMUSCULAR; INTRAVENOUS ONCE AS NEEDED
Status: DISCONTINUED | OUTPATIENT
Start: 2021-12-06 | End: 2021-12-06 | Stop reason: HOSPADM

## 2021-12-06 RX ORDER — FENTANYL CITRATE/PF 50 MCG/ML
25 SYRINGE (ML) INJECTION
Status: DISCONTINUED | OUTPATIENT
Start: 2021-12-06 | End: 2021-12-06 | Stop reason: HOSPADM

## 2021-12-06 RX ORDER — FENTANYL CITRATE 50 UG/ML
INJECTION, SOLUTION INTRAMUSCULAR; INTRAVENOUS AS NEEDED
Status: DISCONTINUED | OUTPATIENT
Start: 2021-12-06 | End: 2021-12-06

## 2021-12-06 RX ORDER — MAGNESIUM HYDROXIDE 1200 MG/15ML
LIQUID ORAL AS NEEDED
Status: DISCONTINUED | OUTPATIENT
Start: 2021-12-06 | End: 2021-12-06 | Stop reason: HOSPADM

## 2021-12-06 RX ORDER — EPHEDRINE SULFATE 50 MG/ML
INJECTION INTRAVENOUS AS NEEDED
Status: DISCONTINUED | OUTPATIENT
Start: 2021-12-06 | End: 2021-12-06

## 2021-12-06 RX ORDER — PROPOFOL 10 MG/ML
INJECTION, EMULSION INTRAVENOUS AS NEEDED
Status: DISCONTINUED | OUTPATIENT
Start: 2021-12-06 | End: 2021-12-06

## 2021-12-06 RX ORDER — SODIUM CHLORIDE, SODIUM LACTATE, POTASSIUM CHLORIDE, CALCIUM CHLORIDE 600; 310; 30; 20 MG/100ML; MG/100ML; MG/100ML; MG/100ML
INJECTION, SOLUTION INTRAVENOUS CONTINUOUS PRN
Status: DISCONTINUED | OUTPATIENT
Start: 2021-12-06 | End: 2021-12-06

## 2021-12-06 RX ORDER — HYDROCODONE BITARTRATE AND ACETAMINOPHEN 5; 325 MG/1; MG/1
1 TABLET ORAL EVERY 6 HOURS PRN
Status: DISCONTINUED | OUTPATIENT
Start: 2021-12-06 | End: 2021-12-06 | Stop reason: HOSPADM

## 2021-12-06 RX ORDER — SORBITOL 30 G/1000ML
IRRIGANT IRRIGATION AS NEEDED
Status: DISCONTINUED | OUTPATIENT
Start: 2021-12-06 | End: 2021-12-06 | Stop reason: HOSPADM

## 2021-12-06 RX ORDER — PHENAZOPYRIDINE HYDROCHLORIDE 100 MG/1
200 TABLET, FILM COATED ORAL ONCE
Status: COMPLETED | OUTPATIENT
Start: 2021-12-06 | End: 2021-12-06

## 2021-12-06 RX ORDER — LEVOFLOXACIN 5 MG/ML
750 INJECTION, SOLUTION INTRAVENOUS EVERY 24 HOURS
Status: DISCONTINUED | OUTPATIENT
Start: 2021-12-06 | End: 2021-12-06 | Stop reason: HOSPADM

## 2021-12-06 RX ORDER — PHENAZOPYRIDINE HYDROCHLORIDE 200 MG/1
200 TABLET, FILM COATED ORAL 3 TIMES DAILY PRN
Qty: 30 TABLET | Refills: 0 | Status: SHIPPED | OUTPATIENT
Start: 2021-12-06 | End: 2022-01-14 | Stop reason: ALTCHOICE

## 2021-12-06 RX ADMIN — HYDROCODONE BITARTRATE AND ACETAMINOPHEN 1 TABLET: 5; 325 TABLET ORAL at 10:06

## 2021-12-06 RX ADMIN — SODIUM CHLORIDE, SODIUM LACTATE, POTASSIUM CHLORIDE, AND CALCIUM CHLORIDE: .6; .31; .03; .02 INJECTION, SOLUTION INTRAVENOUS at 08:30

## 2021-12-06 RX ADMIN — LIDOCAINE HYDROCHLORIDE 50 MG: 10 INJECTION, SOLUTION EPIDURAL; INFILTRATION; INTRACAUDAL; PERINEURAL at 08:34

## 2021-12-06 RX ADMIN — FENTANYL CITRATE 25 MCG: 50 INJECTION, SOLUTION INTRAMUSCULAR; INTRAVENOUS at 08:47

## 2021-12-06 RX ADMIN — PHENAZOPYRIDINE 200 MG: 100 TABLET ORAL at 09:49

## 2021-12-06 RX ADMIN — SODIUM CHLORIDE, SODIUM LACTATE, POTASSIUM CHLORIDE, AND CALCIUM CHLORIDE 125 ML/HR: .6; .31; .03; .02 INJECTION, SOLUTION INTRAVENOUS at 08:21

## 2021-12-06 RX ADMIN — PROPOFOL 200 MG: 10 INJECTION, EMULSION INTRAVENOUS at 08:34

## 2021-12-06 RX ADMIN — FENTANYL CITRATE 50 MCG: 50 INJECTION, SOLUTION INTRAMUSCULAR; INTRAVENOUS at 08:40

## 2021-12-06 RX ADMIN — FENTANYL CITRATE 25 MCG: 50 INJECTION, SOLUTION INTRAMUSCULAR; INTRAVENOUS at 08:34

## 2021-12-06 RX ADMIN — EPHEDRINE SULFATE 10 MG: 50 INJECTION, SOLUTION INTRAVENOUS at 08:55

## 2021-12-06 RX ADMIN — LEVOFLOXACIN 750 MG: 750 INJECTION, SOLUTION INTRAVENOUS at 08:40

## 2021-12-12 ENCOUNTER — HOSPITAL ENCOUNTER (EMERGENCY)
Facility: HOSPITAL | Age: 56
Discharge: HOME | End: 2021-12-12
Attending: EMERGENCY MEDICINE
Payer: COMMERCIAL

## 2021-12-12 ENCOUNTER — APPOINTMENT (EMERGENCY)
Dept: RADIOLOGY | Facility: HOSPITAL | Age: 56
End: 2021-12-12
Payer: COMMERCIAL

## 2021-12-12 VITALS
OXYGEN SATURATION: 99 % | TEMPERATURE: 97.9 F | WEIGHT: 195 LBS | DIASTOLIC BLOOD PRESSURE: 82 MMHG | SYSTOLIC BLOOD PRESSURE: 188 MMHG | HEART RATE: 58 BPM | HEIGHT: 70 IN | BODY MASS INDEX: 27.92 KG/M2 | RESPIRATION RATE: 18 BRPM

## 2021-12-12 DIAGNOSIS — S80.10XA CONTUSION OF LOWER LEG: Primary | ICD-10-CM

## 2021-12-12 DIAGNOSIS — S93.409A ANKLE SPRAIN: ICD-10-CM

## 2021-12-12 PROCEDURE — 73610 X-RAY EXAM OF ANKLE: CPT

## 2021-12-12 PROCEDURE — 99283 EMERGENCY DEPT VISIT LOW MDM: CPT

## 2021-12-12 PROCEDURE — 99284 EMERGENCY DEPT VISIT MOD MDM: CPT | Performed by: PHYSICIAN ASSISTANT

## 2021-12-12 PROCEDURE — 73590 X-RAY EXAM OF LOWER LEG: CPT

## 2021-12-16 ENCOUNTER — NURSE TRIAGE (OUTPATIENT)
Dept: OTHER | Facility: OTHER | Age: 56
End: 2021-12-16

## 2021-12-21 ENCOUNTER — TELEPHONE (OUTPATIENT)
Dept: OTHER | Facility: OTHER | Age: 56
End: 2021-12-21

## 2021-12-21 ENCOUNTER — TELEPHONE (OUTPATIENT)
Dept: UROLOGY | Facility: CLINIC | Age: 56
End: 2021-12-21

## 2022-01-14 ENCOUNTER — OFFICE VISIT (OUTPATIENT)
Dept: UROLOGY | Facility: MEDICAL CENTER | Age: 57
End: 2022-01-14
Payer: COMMERCIAL

## 2022-01-14 VITALS
HEIGHT: 70 IN | HEART RATE: 62 BPM | WEIGHT: 192 LBS | BODY MASS INDEX: 27.49 KG/M2 | DIASTOLIC BLOOD PRESSURE: 82 MMHG | SYSTOLIC BLOOD PRESSURE: 150 MMHG

## 2022-01-14 DIAGNOSIS — C67.9 MALIGNANT NEOPLASM OF URINARY BLADDER, UNSPECIFIED SITE (HCC): Primary | ICD-10-CM

## 2022-01-14 PROCEDURE — 4004F PT TOBACCO SCREEN RCVD TLK: CPT | Performed by: UROLOGY

## 2022-01-14 PROCEDURE — 99214 OFFICE O/P EST MOD 30 MIN: CPT | Performed by: UROLOGY

## 2022-01-14 PROCEDURE — 3008F BODY MASS INDEX DOCD: CPT | Performed by: UROLOGY

## 2022-01-14 NOTE — PROGRESS NOTES
[address] [facility]  [facility]  [address] [facility], [address] [place] [address]  [phone]  www  [facility]  org      NAME: Fei Arana  AGE: 64 y o  SEX: male  : 1965   MRN: [id number]    DATE: 2022  TIME: 2:57 PM    Assessment and Plan:    Bladder cancer as below:  He has opted not to do BCG  We discussed the pros and cons of this  We will follow him closely with cystoscopy every 3 months for the first year for the usual protocol  Pt was counseled to stop smoking  Chief Complaint     Chief Complaint   Patient presents with    review biopsy results       History of Present Illness   Bladder cancer:  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 status post TURBT by me 2021 who underwent a relook by me 2021  This showed only 1 small papillary recurrences 0 5 cm right lateral wall and was resected with a large cup forceps  Pathology showed noninvasive low-grade papillary urothelial carcinoma and muscularis propria was present and uninvolved  He still has a little bit of dysuria but otherwise is voiding okay        The following portions of the patient's history were reviewed and updated as appropriate: allergies, current medications, past family history, past medical history, past social history, past surgical history and problem list   Past Medical History:   Diagnosis Date    Bladder cancer ([facility] [address]     Cancer ([facility] [address]     [facility]      Past Surgical History:   Procedure Laterality Date    ANKLE SURGERY      CYSTOSCOPY N/A 2021    Procedure: CYSTOSCOPY;  Surgeon: Judith Copeland MD;  Location:  MAIN OR;  Service: Urology    HI CYSTOURETHROSCOPY,BIOPSY N/A 2021    Procedure: CYSTOSCOPY, TURBT right lateral wall;  Surgeon: Judith Copeland MD;  Location: [facility];  Service: Urology    TONSILLECTOMY      TRANSURETHRAL RESECTION OF BLADDER TUMOR N/A 2021 Procedure: TRANSURETHRAL RESECTION OF BLADDER TUMOR (TURBT); Surgeon: Nallely Clifford MD;  Location: [facility];  Service: Urology     shoulder  Review of Systems   Review of Systems   Constitutional: Negative for fever  Respiratory: Negative for shortness of breath  Cardiovascular: Negative for chest pain  Genitourinary: Negative  Active Problem List     Patient Active Problem List   Diagnosis    Smoking    Bladder mass    DVT (deep venous thrombosis) (HCC)    Hypertension    Chronic anticoagulation    Malignant neoplasm of anterior wall of bladder (HCC)    Mild tetrahydrocannabinol (THC) abuse       Objective   /82   Pulse 62   Ht 5' 10" (1 778 m)   Wt 87 1 kg (192 lb)   BMI 27 55 kg/m²     Physical Exam  Constitutional:       Appearance: Normal appearance  He is normal weight  HENT:      Head: Normocephalic and atraumatic  Eyes:      Extraocular Movements: Extraocular movements intact  Pulmonary:      Effort: Pulmonary effort is normal    Musculoskeletal:         General: Normal range of motion  Cervical back: Normal range of motion  Skin:     Coloration: Skin is not jaundiced or pale  Neurological:      General: No focal deficit present  Mental Status: He is alert and oriented to person, place, and time  Mental status is at baseline  Psychiatric:         Mood and Affect: Mood normal          Behavior: Behavior normal          Thought Content: Thought content normal          Judgment: Judgment normal              Current Medications     Current Outpatient Medications:     apixaban (Eliquis) 5 mg, Take 1 tablet (5 mg total) by mouth 2 (two) times a day, Disp: 180 tablet, Rfl: 1    metoprolol succinate (Toprol XL) 50 mg 24 hr tablet, Take 1 tablet (50 mg total) by mouth daily, Disp: 90 tablet, Rfl: 3    enoxaparin (LOVENOX) 40 mg/0 4 mL, ONe syringe daily sq at HS starting 11/30 x five days  Last dose of lovenox is 12/4  Lashawn Blend Last dose of Eliquis is 11/29   (Patient not taking: Reported on 1/14/2022 ), Disp: 2 mL, Rfl: 0    phenazopyridine (PYRIDIUM) 200 mg tablet, Take 1 tablet (200 mg total) by mouth 3 (three) times a day as needed for bladder spasms (Patient not taking: Reported on 1/14/2022 ), Disp: 30 tablet, Rfl: 0        Viona Peabody, MD

## 2022-03-10 ENCOUNTER — TELEPHONE (OUTPATIENT)
Dept: OTHER | Facility: OTHER | Age: 57
End: 2022-03-10

## 2022-03-10 NOTE — TELEPHONE ENCOUNTER
Patient is calling regarding upcoming procedure visit 5/13  He would like to know if he is risking his health by not doing chemo through catheter, or if he can get sedated for the procedure

## 2022-03-15 NOTE — TELEPHONE ENCOUNTER
Please let him know that it would be better for him to get the treatment through the catheter such as BCG or gemcitabine-however the only sedation I can offer is for him to take a Valium prior to each treatment and he will need a   We will await his decision

## 2022-03-15 NOTE — TELEPHONE ENCOUNTER
Called and spoke with patient  Reviewed with patient BCG decreases the recurrence of the bladder cancer by 80 percent but does not prevent the spread of the cancer per Dr Arias Haider  Patient may require another procedure but patient will not die in the near future if he decides no treatment  Patient continues to refuse BCG treatment  Has cystoscopy appointment with Dr Arias Haider May 2022

## 2022-07-21 PROCEDURE — 52000 CYSTOURETHROSCOPY: CPT | Performed by: UROLOGY

## 2022-07-21 NOTE — PROGRESS NOTES
[address] [facility]  [facility]  [address] [facility]  [place], [address] [place] [address]  [phone]  www  [facility]  org      NAME: Dilcia Vazquez  AGE: 64 y o  SEX: male  : 1965   MRN: [id number]    DATE: 2022  TIME: 8:50 AM    Assessment and Plan:    Bladder cancer, superficial papillary transitional cell with recurrence with 2 lesions in the posterior wall, and a 2 mm lesion near the right lateral trigone  Plan TURBT with mitomycin installation  The risks of bleeding infection damage to bladder need for additional procedures were explained he gives informed consent  We will schedule this  Chief Complaint     Chief Complaint   Patient presents with    Cystoscopy       History of Present Illness   Bladder cancer:  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 status post TURBT by me 2021 who underwent a relook by me 2021  This showed only 1 small papillary recurrences 0 5 cm right lateral wall and was resected with a large cup forceps  Pathology showed noninvasive low-grade papillary urothelial carcinoma and muscularis propria was present and uninvolved  Refused BCG therapy  Here for surveillance cystoscopy  no gross hematuria  Continues to smoke "like a chimney"- counseled regarding this  Cystoscopy     Date/Time 2022 4:20 PM     Performed by  Viona Peabody, MD     Authorized by Viona Peabody, MD      Universal Protocol:  Consent: Verbal consent obtained  Written consent obtained        Procedure Details:  Procedure type: cystoscopy    Additional Procedure Details: Cystoscopy Procedure Note        Pre-operative Diagnosis:  Bladder cancer surveillance    Post-operative Diagnosis:  Bladder cancer surveillance, with 2 papillary recurrences, each 1 cm posterior wall, and 1 tiny 2 mm recurrence near the previous resection site right lateral trigone    Procedure: Flexible cystoscopy    Surgeon: Cha Louis Viv Hager MD    Anesthesia: 1% Xylocaine per urethra    EBL: Minimal    Complications: none    Procedure Details   The risks, benefits, complications, treatment options, and expected outcomes were discussed with the patient  The patient concurred with the proposed plan, giving informed consent  Cystoscopy was performed today under local anesthesia, using sterile technique  The patient was placed in the supine position, prepped with Betadine, and draped in the usual sterile fashion  The flexible cystocope was used to inspect both the urethra and bladder    Findings:  Urethra:  Normal without stricture  Prostate nonobstructive  Bladder:  Smooth, not trabeculated there were 2 papillary recurrences that looks superficial posterior wall, and a tiny 2 mm recurrence near the previous resection site     The orifices were orthotopic and intact  Specimens:  None                 Complications:  None           Disposition: To home            Condition:  Stable          The following portions of the patient's history were reviewed and updated as appropriate: allergies, current medications, past family history, past medical history, past social history, past surgical history and problem list   Past Medical History:   Diagnosis Date    [facility] )     [facility]     [facility]deep venous thrombosis) ([facility] )      Past Surgical History:   Procedure Laterality Date    ANKLE SURGERY      CYSTOSCOPY N/A 8/30/2021    Procedure: CYSTOSCOPY;  Surgeon: Sherryle Alice, MD;  [facility];  Service: Urology    [place] CYSTOURETHROSCOPY,BIOPSY N/A 12/6/2021    Procedure: CYSTOSCOPY, TURBT right lateral wall;  Surgeon: Sherryle Alice, MD;  Location: [facility];  Service: Urology    TONSILLECTOMY      TRANSURETHRAL RESECTION OF BLADDER TUMOR N/A 8/30/2021    Procedure: TRANSURETHRAL RESECTION OF BLADDER TUMOR (TURBT);   Surgeon: Sherryle Alice, MD;  Location: [facility];  Service: Urology     [facility]  Review of Systems Review of Systems   Constitutional: Negative for fever  Respiratory: Negative for shortness of breath  Cardiovascular: Negative for chest pain  Genitourinary: Negative  Musculoskeletal: Negative for back pain  Active Problem List     Patient Active Problem List   Diagnosis    Smoking    Bladder mass    DVT (deep venous thrombosis) (HCC)    Hypertension    Chronic anticoagulation    Malignant neoplasm of anterior wall of bladder (HCC)    Mild tetrahydrocannabinol (THC) abuse       Objective   /92 (BP Location: Left arm, Patient Position: Sitting, Cuff Size: Adult)   Pulse 72   Ht 5' 10" (1 778 m)   Wt 86 6 kg (191 lb)   BMI 27 41 kg/m²     Physical Exam  Vitals reviewed  Constitutional:       Appearance: Normal appearance  He is normal weight  HENT:      Head: Normocephalic and atraumatic  Eyes:      Extraocular Movements: Extraocular movements intact  Cardiovascular:      Rate and Rhythm: Normal rate and regular rhythm  Pulses: Normal pulses  Pulmonary:      Effort: Pulmonary effort is normal    Abdominal:      Palpations: Abdomen is soft  Genitourinary:     Penis: Normal     Musculoskeletal:         General: Normal range of motion  Cervical back: Normal range of motion  Skin:     Coloration: Skin is not jaundiced or pale  Neurological:      General: No focal deficit present  Mental Status: He is alert and oriented to person, place, and time  Mental status is at baseline  Psychiatric:         Mood and Affect: Mood normal          Behavior: Behavior normal          Thought Content:  Thought content normal          Judgment: Judgment normal              Current Medications     Current Outpatient Medications:     apixaban (Eliquis) 5 mg, Take 1 tablet (5 mg total) by mouth 2 (two) times a day, Disp: 180 tablet, Rfl: 1    ciprofloxacin (Cipro) 500 mg tablet, Take 1 tablet (500 mg total) by mouth once for 1 dose Take 1 dose after  procedure, Disp: 1 tablet, Rfl: 0    metoprolol succinate (Toprol XL) 50 mg 24 hr tablet, Take 1 tablet (50 mg total) by mouth daily, Disp: 90 tablet, Rfl: 3    enoxaparin (LOVENOX) 40 mg/0 4 mL, ONe syringe daily sq at HS starting 11/30 x five days  Last dose of lovenox is 12/4  [place] Last dose of Eliquis is 11/29   (Patient not taking: No sig reported), Disp: 2 mL, Rfl: 0        Lalito Lazcano MD

## 2022-07-22 ENCOUNTER — TELEPHONE (OUTPATIENT)
Dept: UROLOGY | Facility: CLINIC | Age: 57
End: 2022-07-22

## 2022-07-22 ENCOUNTER — PROCEDURE VISIT (OUTPATIENT)
Dept: UROLOGY | Facility: MEDICAL CENTER | Age: 57
End: 2022-07-22
Payer: COMMERCIAL

## 2022-07-22 VITALS
WEIGHT: 191 LBS | HEART RATE: 72 BPM | SYSTOLIC BLOOD PRESSURE: 162 MMHG | HEIGHT: 70 IN | DIASTOLIC BLOOD PRESSURE: 92 MMHG | BODY MASS INDEX: 27.35 KG/M2

## 2022-07-22 DIAGNOSIS — C67.9 MALIGNANT NEOPLASM OF URINARY BLADDER, UNSPECIFIED SITE (HCC): Primary | ICD-10-CM

## 2022-07-22 LAB
SL AMB  POCT GLUCOSE, UA: ABNORMAL
SL AMB LEUKOCYTE ESTERASE,UA: ABNORMAL
SL AMB POCT BILIRUBIN,UA: ABNORMAL
SL AMB POCT BLOOD,UA: ABNORMAL
SL AMB POCT CLARITY,UA: CLEAR
SL AMB POCT COLOR,UA: YELLOW
SL AMB POCT KETONES,UA: ABNORMAL
SL AMB POCT NITRITE,UA: ABNORMAL
SL AMB POCT PH,UA: 5.5
SL AMB POCT SPECIFIC GRAVITY,UA: 1.02
SL AMB POCT URINE PROTEIN: ABNORMAL
SL AMB POCT UROBILINOGEN: 0.2

## 2022-07-22 PROCEDURE — 81003 URINALYSIS AUTO W/O SCOPE: CPT | Performed by: UROLOGY

## 2022-07-22 PROCEDURE — 99214 OFFICE O/P EST MOD 30 MIN: CPT | Performed by: UROLOGY

## 2022-07-22 RX ORDER — CIPROFLOXACIN 500 MG/1
500 TABLET, FILM COATED ORAL ONCE
Qty: 1 TABLET | Refills: 0 | Status: SHIPPED | OUTPATIENT
Start: 2022-07-22 | End: 2022-07-22 | Stop reason: SDUPTHER

## 2022-07-22 RX ORDER — CIPROFLOXACIN 500 MG/1
500 TABLET, FILM COATED ORAL ONCE
Qty: 1 TABLET | Refills: 0 | Status: SHIPPED | OUTPATIENT
Start: 2022-07-22 | End: 2022-07-22

## 2022-07-22 RX ORDER — LEVOFLOXACIN 5 MG/ML
750 INJECTION, SOLUTION INTRAVENOUS ONCE
Status: CANCELLED | OUTPATIENT
Start: 2022-09-08 | End: 2022-07-22

## 2022-07-22 NOTE — H&P
[address] [facility]  [facility]  [address] [facility], [address] [place] [address]  [phone]  www  [facility]  org      NAME: Fei Arana  AGE: 64 y o  SEX: male  : 1965   MRN: [id number]    DATE: 2022  TIME: 8:50 AM    Assessment and Plan:    Bladder cancer, superficial papillary transitional cell with recurrence with 2 lesions in the posterior wall, and a 2 mm lesion near the right lateral trigone  Plan TURBT with mitomycin installation  The risks of bleeding infection damage to bladder need for additional procedures were explained he gives informed consent  We will schedule this  Chief Complaint     Chief Complaint   Patient presents with    Cystoscopy       History of Present Illness   Bladder cancer:  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 status post TURBT by me 2021 who underwent a relook by me 2021  This showed only 1 small papillary recurrences 0 5 cm right lateral wall and was resected with a large cup forceps  Pathology showed noninvasive low-grade papillary urothelial carcinoma and muscularis propria was present and uninvolved  Refused BCG therapy  Here for surveillance cystoscopy  no gross hematuria  Continues to smoke "like a chimney"- counseled regarding this  Cystoscopy     Date/Time 2022 4:20 PM     Performed by  Judith Copeland MD     Authorized by Judith Copeland MD      Universal Protocol:  Consent: Verbal consent obtained  Written consent obtained        Procedure Details:  Procedure type: cystoscopy    Additional Procedure Details: Cystoscopy Procedure Note        Pre-operative Diagnosis:  Bladder cancer surveillance    Post-operative Diagnosis:  Bladder cancer surveillance, with 2 papillary recurrences, each 1 cm posterior wall, and 1 tiny 2 mm recurrence near the previous resection site right lateral trigone    Procedure: Flexible cystoscopy    Surgeon: Leonardo Wells [place] MD    Anesthesia: 1% Xylocaine per urethra    EBL: Minimal    Complications: none    Procedure Details   The risks, benefits, complications, treatment options, and expected outcomes were discussed with the patient  The patient concurred with the proposed plan, giving informed consent  Cystoscopy was performed today under local anesthesia, using sterile technique  The patient was placed in the supine position, prepped with Betadine, and draped in the usual sterile fashion  The flexible cystocope was used to inspect both the urethra and bladder    Findings:  Urethra:  Normal without stricture  Prostate nonobstructive  Bladder:  Smooth, not trabeculated there were 2 papillary recurrences that looks superficial posterior wall, and a tiny 2 mm recurrence near the previous resection site     The orifices were orthotopic and intact  Specimens:  None                 Complications:  None           Disposition: To home            Condition:  Stable          The following portions of the patient's history were reviewed and updated as appropriate: allergies, current medications, past family history, past medical history, past social history, past surgical history and problem list   Past Medical History:   Diagnosis Date    [facility] )     [facility]     DVT (deep venous thrombosis) ([facility] )      Past Surgical History:   Procedure Laterality Date    ANKLE SURGERY      CYSTOSCOPY N/A 8/30/2021    Procedure: CYSTOSCOPY;  Surgeon: Lalito Lazcano MD;  Location: [facility];  Service: Urology    [place] CYSTOURETHROSCOPY,BIOPSY N/A 12/6/2021    Procedure: CYSTOSCOPY, TURBT right lateral wall;  Surgeon: Lalito Lazcano MD;  Location: [facility];  Service: Urology    TONSILLECTOMY      TRANSURETHRAL RESECTION OF BLADDER TUMOR N/A 8/30/2021    Procedure: TRANSURETHRAL RESECTION OF BLADDER TUMOR (TURBT);   Surgeon: Lalito Lazcano MD;  Location: [facility];  Service: Urology     [facility]  Review of Systems Review of Systems   Constitutional: Negative for fever  Respiratory: Negative for shortness of breath  Cardiovascular: Negative for chest pain  Genitourinary: Negative  Musculoskeletal: Negative for back pain  Active Problem List     Patient Active Problem List   Diagnosis    Smoking    Bladder mass    DVT (deep venous thrombosis) (HCC)    Hypertension    Chronic anticoagulation    Malignant neoplasm of anterior wall of bladder (HCC)    Mild tetrahydrocannabinol (THC) abuse       Objective   /92 (BP Location: Left arm, Patient Position: Sitting, Cuff Size: Adult)   Pulse 72   Ht 5' 10" (1 778 m)   Wt 86 6 kg (191 lb)   BMI 27 41 kg/m²     Physical Exam  Vitals reviewed  Constitutional:       Appearance: Normal appearance  He is normal weight  HENT:      Head: Normocephalic and atraumatic  Eyes:      Extraocular Movements: Extraocular movements intact  Cardiovascular:      Rate and Rhythm: Normal rate and regular rhythm  Pulses: Normal pulses  Pulmonary:      Effort: Pulmonary effort is normal    Abdominal:      Palpations: Abdomen is soft  Genitourinary:     Penis: Normal     Musculoskeletal:         General: Normal range of motion  Cervical back: Normal range of motion  Skin:     Coloration: Skin is not jaundiced or pale  Neurological:      General: No focal deficit present  Mental Status: He is alert and oriented to person, place, and time  Mental status is at baseline  Psychiatric:         Mood and Affect: Mood normal          Behavior: Behavior normal          Thought Content:  Thought content normal          Judgment: Judgment normal              Current Medications     Current Outpatient Medications:     apixaban (Eliquis) 5 mg, Take 1 tablet (5 mg total) by mouth 2 (two) times a day, Disp: 180 tablet, Rfl: 1    ciprofloxacin (Cipro) 500 mg tablet, Take 1 tablet (500 mg total) by mouth once for 1 dose Take 1 dose after  procedure, Disp: 1 tablet, Rfl: 0    metoprolol succinate (Toprol XL) 50 mg 24 hr tablet, Take 1 tablet (50 mg total) by mouth daily, Disp: 90 tablet, Rfl: 3    enoxaparin (LOVENOX) 40 mg/0 4 mL, ONe syringe daily sq at HS starting 11/30 x five days  Last dose of lovenox is 12/4  [place] Last dose of Eliquis is 11/29   (Patient not taking: No sig reported), Disp: 2 mL, Rfl: 0        Lalito Lazcano MD

## 2022-07-22 NOTE — TELEPHONE ENCOUNTER
Called patient and left a voicemail for patient to call me back to schedule their surgery  Patient to call me at [phone]

## 2022-07-22 NOTE — LETTER
2022     [facility] [address] [facility]    Patient: Carl Douglas   YOB: 1965   Date of Visit: 2022       Dear Dr Jaquelin Tan: Thank you for referring Carl Douglas to me for evaluation  Below are my notes for this consultation  If you have questions, please do not hesitate to call me  I look forward to following your patient along with you  Sincerely,        Mónica Layton MD        CC: No Recipients  Mónica Layton MD  2022  9:01 AM  Sign when Signing Visit  [address] [facility]  [place] [phone]  www  [facility]  org      NAME: Carl Douglas  AGE: 64 y o  SEX: male  : 1965   MRN: [id number]    DATE: 2022  TIME: 8:50 AM    Assessment and Plan:    Bladder cancer, superficial papillary transitional cell with recurrence with 2 lesions in the posterior wall, and a 2 mm lesion near the right lateral trigone  Plan TURBT with mitomycin installation  The risks of bleeding infection damage to bladder need for additional procedures were explained he gives informed consent  We will schedule this  Chief Complaint     Chief Complaint   Patient presents with    Cystoscopy       History of Present Illness   Bladder cancer:  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 status post TURBT by me 2021 who underwent a relook by me 2021  This showed only 1 small papillary recurrences 0 5 cm right lateral wall and was resected with a large cup forceps  Pathology showed noninvasive low-grade papillary urothelial carcinoma and muscularis propria was present and uninvolved  Refused BCG therapy  Here for surveillance cystoscopy  no gross hematuria  Continues to smoke "like a chimney"- counseled regarding this         Cystoscopy     Date/Time 2022 4:20 PM     Performed by  Mónica Layton MD     Authorized by Viona Peabody, MD      Universal Protocol:  Consent: Verbal consent obtained  Written consent obtained  Procedure Details:  Procedure type: cystoscopy    Additional Procedure Details: Cystoscopy Procedure Note        Pre-operative Diagnosis:  Bladder cancer surveillance    Post-operative Diagnosis:  Bladder cancer surveillance, with 2 papillary recurrences, each 1 cm posterior wall, and 1 tiny 2 mm recurrence near the previous resection site right lateral trigone    Procedure: Flexible cystoscopy    Surgeon: Zane Parikh MD    Anesthesia: 1% Xylocaine per urethra    EBL: Minimal    Complications: none    Procedure Details   The risks, benefits, complications, treatment options, and expected outcomes were discussed with the patient  The patient concurred with the proposed plan, giving informed consent  Cystoscopy was performed today under local anesthesia, using sterile technique  The patient was placed in the supine position, prepped with Betadine, and draped in the usual sterile fashion  The flexible cystocope was used to inspect both the urethra and bladder    Findings:  Urethra:  Normal without stricture  Prostate nonobstructive  Bladder:  Smooth, not trabeculated there were 2 papillary recurrences that looks superficial posterior wall, and a tiny 2 mm recurrence near the previous resection site     The orifices were orthotopic and intact             Specimens:  None                 Complications:  None           Disposition: To home            Condition:  Stable          The following portions of the patient's history were reviewed and updated as appropriate: allergies, current medications, past family history, past medical history, past social history, past surgical history and problem list   Past Medical History:   Diagnosis Date    [facility] )     [facility]     [facility]deep [facility] thrombosis) ([facility] )      Past Surgical History:   Procedure Laterality Date    ANKLE SURGERY      CYSTOSCOPY N/A 8/30/2021    Procedure: CYSTOSCOPY;  Surgeon: Mónica Layton MD;  [facility]: [facility];  Service: Urology    [place] CYSTOURETHROSCOPY,BIOPSY N/A 12/6/2021    Procedure: CYSTOSCOPY, TURBT right lateral wall;  Surgeon: Mónica Layton MD;  Location: [facility];  Service: Urology    TONSILLECTOMY      TRANSURETHRAL RESECTION OF BLADDER TUMOR N/A 8/30/2021    Procedure: TRANSURETHRAL RESECTION OF BLADDER TUMOR (TURBT); Surgeon: Mónica Layton MD;  Location: [facility];  Service: Urology     shoulder  Review of Systems   Review of Systems   Constitutional: Negative for fever  Respiratory: Negative for shortness of breath  Cardiovascular: Negative for chest pain  Genitourinary: Negative  Musculoskeletal: Negative for back pain  Active Problem List     Patient Active Problem List   Diagnosis    Smoking    Bladder mass    DVT (deep venous thrombosis) (HCC)    Hypertension    Chronic anticoagulation    Malignant neoplasm of anterior wall of bladder (HCC)    Mild tetrahydrocannabinol (THC) abuse       Objective   /92 (BP Location: Left arm, Patient Position: Sitting, Cuff Size: Adult)   Pulse 72   Ht 5' 10" (1 778 m)   Wt 86 6 kg (191 lb)   BMI 27 41 kg/m²     Physical Exam  Vitals reviewed  Constitutional:       Appearance: Normal appearance  He is normal weight  HENT:      Head: Normocephalic and atraumatic  Eyes:      Extraocular Movements: Extraocular movements intact  Cardiovascular:      Rate and Rhythm: Normal rate and regular rhythm  Pulses: Normal pulses  Pulmonary:      Effort: Pulmonary effort is normal    Abdominal:      Palpations: Abdomen is soft  Genitourinary:     Penis: Normal     Musculoskeletal:         General: Normal range of motion  Cervical back: Normal range of motion  Skin:     Coloration: Skin is not jaundiced or pale  Neurological:      General: No focal deficit present        Mental Status: He is alert and oriented to person, place, and time  Mental status is at baseline  Psychiatric:         Mood and Affect: Mood normal          Behavior: Behavior normal          Thought Content: Thought content normal          Judgment: Judgment normal              Current Medications     Current Outpatient Medications:     apixaban (Eliquis) 5 mg, Take 1 tablet (5 mg total) by mouth 2 (two) times a day, Disp: 180 tablet, Rfl: 1    ciprofloxacin (Cipro) 500 mg tablet, Take 1 tablet (500 mg total) by mouth once for 1 dose Take 1 dose after  procedure, Disp: 1 tablet, Rfl: 0    metoprolol succinate (Toprol XL) 50 mg 24 hr tablet, Take 1 tablet (50 mg total) by mouth daily, Disp: 90 tablet, Rfl: 3    enoxaparin (LOVENOX) 40 mg/0 4 mL, ONe syringe daily sq at HS starting 11/30 x five days  Last dose of lovenox is 12/4  [place] Last dose of Eliquis is 11/29   (Patient not taking: No sig reported), Disp: 2 mL, Rfl: 0        Hortencia Steele MD

## 2022-07-22 NOTE — TELEPHONE ENCOUNTER
----- Message from Chuck Adame MD sent at 7/22/2022  9:03 AM EDT -----  Please call patient to set up TURBT with mitomycin installation at [facility] in the near future  Case request placed

## 2022-07-25 ENCOUNTER — PREP FOR PROCEDURE (OUTPATIENT)
Dept: UROLOGY | Facility: CLINIC | Age: 57
End: 2022-07-25

## 2022-07-25 DIAGNOSIS — C67.9 MALIGNANT NEOPLASM OF URINARY BLADDER, UNSPECIFIED SITE (HCC): Primary | ICD-10-CM

## 2022-07-25 NOTE — TELEPHONE ENCOUNTER
Called and spoke with patient to schedule sx  Patient informed of all PAT's needed prior to sx and advised to stop any anticoagulant medication including Multi-vitamins, Fish oil, Krill oil and NSAID 7days prior  Informed patient nothing to eat or drink after midnight the night before  Patient has also been inform that the hospital will call the day before sx with arrival time and procedure time  Patient also informed to have a  bring them to and from hospital  Bowel Prep and Showering Instruction has been told to the patient and will also be included in Surgical Packet to be mailed      Sx Date: 9/8  Location: [facility]  Physician: Jermaine Rasheed  H&P Date:  8/25  Clearance Date:  Medical Clearance 8/26  Consent: on admit  Pre-cert Added to  list on : 7/25 bc    PAT's Ordered to be done on:  8/25 cbc/ cmp/ ptt/ inr/ ucx/ ekg    Post Op: 9/22    Surgical Packet to be: mailed 7/25

## 2022-08-02 DIAGNOSIS — I82.4Z3 ACUTE DEEP VEIN THROMBOSIS (DVT) OF DISTAL VEIN OF BOTH LOWER EXTREMITIES (HCC): ICD-10-CM

## 2022-08-02 RX ORDER — APIXABAN 5 MG/1
TABLET, FILM COATED ORAL
Qty: 60 TABLET | Refills: 5 | Status: SHIPPED | OUTPATIENT
Start: 2022-08-02

## 2022-08-19 ENCOUNTER — TELEPHONE (OUTPATIENT)
Dept: UROLOGY | Facility: MEDICAL CENTER | Age: 57
End: 2022-08-19

## 2022-08-25 ENCOUNTER — CLINICAL SUPPORT (OUTPATIENT)
Dept: URGENT CARE | Facility: MEDICAL CENTER | Age: 57
End: 2022-08-25
Payer: COMMERCIAL

## 2022-08-25 ENCOUNTER — PREP FOR PROCEDURE (OUTPATIENT)
Dept: UROLOGY | Facility: CLINIC | Age: 57
End: 2022-08-25

## 2022-08-25 ENCOUNTER — LAB (OUTPATIENT)
Dept: LAB | Facility: MEDICAL CENTER | Age: 57
End: 2022-08-25
Payer: COMMERCIAL

## 2022-08-25 ENCOUNTER — RA CDI HCC (OUTPATIENT)
Dept: OTHER | Facility: HOSPITAL | Age: 57
End: 2022-08-25

## 2022-08-25 ENCOUNTER — OFFICE VISIT (OUTPATIENT)
Dept: UROLOGY | Facility: CLINIC | Age: 57
End: 2022-08-25
Payer: COMMERCIAL

## 2022-08-25 VITALS
HEIGHT: 70 IN | DIASTOLIC BLOOD PRESSURE: 80 MMHG | SYSTOLIC BLOOD PRESSURE: 132 MMHG | BODY MASS INDEX: 28.2 KG/M2 | WEIGHT: 197 LBS

## 2022-08-25 DIAGNOSIS — C67.9 MALIGNANT NEOPLASM OF URINARY BLADDER, UNSPECIFIED SITE (HCC): ICD-10-CM

## 2022-08-25 DIAGNOSIS — C67.9 MALIGNANT NEOPLASM OF URINARY BLADDER, UNSPECIFIED SITE (HCC): Primary | ICD-10-CM

## 2022-08-25 LAB
ALBUMIN SERPL BCP-MCNC: 3.8 G/DL (ref 3.5–5)
ALP SERPL-CCNC: 63 U/L (ref 46–116)
ALT SERPL W P-5'-P-CCNC: 33 U/L (ref 12–78)
ANION GAP SERPL CALCULATED.3IONS-SCNC: 4 MMOL/L (ref 4–13)
APTT PPP: 29 SECONDS (ref 23–37)
AST SERPL W P-5'-P-CCNC: 37 U/L (ref 5–45)
ATRIAL RATE: 45 BPM
BASOPHILS # BLD AUTO: 0.11 THOUSANDS/ΜL (ref 0–0.1)
BASOPHILS NFR BLD AUTO: 1 % (ref 0–1)
BILIRUB SERPL-MCNC: 0.7 MG/DL (ref 0.2–1)
BUN SERPL-MCNC: 15 MG/DL (ref 5–25)
CALCIUM SERPL-MCNC: 8.8 MG/DL (ref 8.3–10.1)
CHLORIDE SERPL-SCNC: 108 MMOL/L (ref 96–108)
CO2 SERPL-SCNC: 27 MMOL/L (ref 21–32)
CREAT SERPL-MCNC: 1.01 MG/DL (ref 0.6–1.3)
EOSINOPHIL # BLD AUTO: 0.22 THOUSAND/ΜL (ref 0–0.61)
EOSINOPHIL NFR BLD AUTO: 3 % (ref 0–6)
ERYTHROCYTE [DISTWIDTH] IN BLOOD BY AUTOMATED COUNT: 13.5 % (ref 11.6–15.1)
GFR SERPL CREATININE-BSD FRML MDRD: 82 ML/MIN/1.73SQ M
GLUCOSE P FAST SERPL-MCNC: 97 MG/DL (ref 65–99)
HCT VFR BLD AUTO: 48.8 % (ref 36.5–49.3)
HGB BLD-MCNC: 15.8 G/DL (ref 12–17)
IMM GRANULOCYTES # BLD AUTO: 0.04 THOUSAND/UL (ref 0–0.2)
IMM GRANULOCYTES NFR BLD AUTO: 1 % (ref 0–2)
INR PPP: 0.88 (ref 0.84–1.19)
LYMPHOCYTES # BLD AUTO: 1.74 THOUSANDS/ΜL (ref 0.6–4.47)
LYMPHOCYTES NFR BLD AUTO: 21 % (ref 14–44)
MCH RBC QN AUTO: 29.3 PG (ref 26.8–34.3)
MCHC RBC AUTO-ENTMCNC: 32.4 G/DL (ref 31.4–37.4)
MCV RBC AUTO: 90 FL (ref 82–98)
MONOCYTES # BLD AUTO: 0.66 THOUSAND/ΜL (ref 0.17–1.22)
MONOCYTES NFR BLD AUTO: 8 % (ref 4–12)
NEUTROPHILS # BLD AUTO: 5.52 THOUSANDS/ΜL (ref 1.85–7.62)
NEUTS SEG NFR BLD AUTO: 66 % (ref 43–75)
NRBC BLD AUTO-RTO: 0 /100 WBCS
P AXIS: 40 DEGREES
PLATELET # BLD AUTO: 262 THOUSANDS/UL (ref 149–390)
PMV BLD AUTO: 9.9 FL (ref 8.9–12.7)
POTASSIUM SERPL-SCNC: 4.1 MMOL/L (ref 3.5–5.3)
PR INTERVAL: 184 MS
PROT SERPL-MCNC: 7.2 G/DL (ref 6.4–8.4)
PROTHROMBIN TIME: 12.1 SECONDS (ref 11.6–14.5)
QRS AXIS: -55 DEGREES
QRSD INTERVAL: 112 MS
QT INTERVAL: 468 MS
QTC INTERVAL: 404 MS
RBC # BLD AUTO: 5.4 MILLION/UL (ref 3.88–5.62)
SODIUM SERPL-SCNC: 139 MMOL/L (ref 135–147)
T WAVE AXIS: -50 DEGREES
VENTRICULAR RATE: 45 BPM
WBC # BLD AUTO: 8.29 THOUSAND/UL (ref 4.31–10.16)

## 2022-08-25 PROCEDURE — 36415 COLL VENOUS BLD VENIPUNCTURE: CPT

## 2022-08-25 PROCEDURE — 85730 THROMBOPLASTIN TIME PARTIAL: CPT

## 2022-08-25 PROCEDURE — 80053 COMPREHEN METABOLIC PANEL: CPT

## 2022-08-25 PROCEDURE — 99213 OFFICE O/P EST LOW 20 MIN: CPT

## 2022-08-25 PROCEDURE — 93005 ELECTROCARDIOGRAM TRACING: CPT

## 2022-08-25 PROCEDURE — 93010 ELECTROCARDIOGRAM REPORT: CPT | Performed by: INTERNAL MEDICINE

## 2022-08-25 PROCEDURE — 85610 PROTHROMBIN TIME: CPT

## 2022-08-25 PROCEDURE — 87086 URINE CULTURE/COLONY COUNT: CPT

## 2022-08-25 PROCEDURE — 85025 COMPLETE CBC W/AUTO DIFF WBC: CPT

## 2022-08-25 NOTE — PROGRESS NOTES
[facility]  coding opportunities       Chart reviewed, no opportunity found: CHART REVIEWED, NO OPPORTUNITY FOUND        Patients Insurance        Commercial Insurance: [employer]

## 2022-08-25 NOTE — H&P (VIEW-ONLY)
8/25/2022    Chief Complaint   Patient presents with    Follow-up       Assessment and Plan    64 y o  male manage by Dr Nancy Marroquin    1  Bladder Cancer  · History and physical was performed for the patient's upcoming TURBT with instillation of mitomycin on 9/08/2022 with Dr Nancy Marroquin  Technique, benefits, and risk of the procedure listed above were discussed with them today and informed written consent was obtained  All questions and concerns regarding surgery and perioperative expectations have been addressed and answered  No overall changes in their health since last visit  Denies any prior complications with anesthesia  · EKG and pre-operative blood work being completed today      History of Present Illness  Roula Sullivan is a 64 y o  male patient of Dr Nancy Marroquin  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 by Dr Nancy Marroquin in July 2021  Follow-up cystoscopy showed only 1 small papillary recurrence 0 5 cm right lateral wall and resected with a large cup forceps  Pathology showed noninvasive low-grade papillary urothelial carcinoma and muscularis propria was present and uninvolved  He refused BCG therapy  He had a surveillance cystoscopy on 07/22/2022 with Dr Nancy Marroquin which showed a superficial papillary transition cell with recurrence with 2 lesions in the posterior wall, and a 2 mm lesion near the right lateral trigone  Recommendations are for a TURBT with mitomycin installation  Review of Systems   Constitutional: Negative for chills and fever  HENT: Negative for congestion and sore throat  Respiratory: Negative for cough and shortness of breath  Cardiovascular: Negative for chest pain and leg swelling  Gastrointestinal: Negative for abdominal pain, constipation, diarrhea, nausea and vomiting  Genitourinary: Positive for frequency  Negative for difficulty urinating, dysuria, hematuria, testicular pain and urgency     Musculoskeletal: Negative for back pain and gait problem  Skin: Negative for wound  Allergic/Immunologic: Negative for immunocompromised state  Neurological: Negative for dizziness, weakness and numbness  Hematological: Does not bruise/bleed easily  Vitals  Vitals:    08/25/22 0801   BP: 132/80   Weight: 89 4 kg (197 lb)   Height: 5' 10" (1 778 m)       Physical Exam  Vitals reviewed  Constitutional:       General: He is not in acute distress  Appearance: Normal appearance  He is not ill-appearing or toxic-appearing  HENT:      Head: Normocephalic and atraumatic  Nose: Nose normal       Mouth/Throat:      Mouth: Mucous membranes are moist       Pharynx: Oropharynx is clear  Eyes:      General: No scleral icterus  Conjunctiva/sclera: Conjunctivae normal    Cardiovascular:      Rate and Rhythm: Normal rate and regular rhythm  Pulses: Normal pulses  Heart sounds: Normal heart sounds  Pulmonary:      Effort: Pulmonary effort is normal  No respiratory distress  Breath sounds: Normal breath sounds  Abdominal:      General: Abdomen is flat  Bowel sounds are normal       Palpations: Abdomen is soft  Tenderness: There is no abdominal tenderness  There is no right CVA tenderness or left CVA tenderness  Hernia: No hernia is present  Musculoskeletal:      Cervical back: Normal range of motion  Right lower leg: No edema  Left lower leg: No edema  Skin:     General: Skin is warm and dry  Coloration: Skin is not jaundiced or pale  Neurological:      General: No focal deficit present  Mental Status: He is alert and oriented to person, place, and time  Mental status is at baseline  Gait: Gait normal    Psychiatric:         Mood and Affect: Mood normal          Behavior: Behavior normal          Thought Content:  Thought content normal          Judgment: Judgment normal          Past History  Past Medical History:   Diagnosis Date    [facility]     [facility]     DVT (deep venous thrombosis) ([facility] )      Social History     Socioeconomic History    Marital status: /Civil Union     Spouse name: None    Number of children: None    Years of education: None    Highest education level: None   Occupational History    Occupation: EMPLOYED   Tobacco Use    Smoking status: Current Every Day Smoker     Packs/day: 1 50     Years: 20 00     Pack years: 30 00    Smokeless tobacco: Never Used   Vaping Use    Vaping Use: Never used   Substance and Sexual Activity    Alcohol use: Yes     Comment: average 1 beer day    Drug use: Not Currently     Types: Marijuana    Sexual activity: Not Currently   Other Topics Concern    None   Social History Narrative    EMPLOYED         Social Determinants of Health     Financial Resource Strain: Not on file   Food Insecurity: Not on file   Transportation Needs: Not on file   Physical Activity: Not on file   Stress: Not on file   Social Connections: Not on file   Intimate Partner Violence: Not on file   Housing Stability: Not on file     Social History     Tobacco Use   Smoking Status Current Every Day Smoker    Packs/day: 1 50    Years: 20 00    Pack years: 30 00   Smokeless Tobacco Never Used     History reviewed  No pertinent family history      The following portions of the patient's history were reviewed and updated as appropriate allergies, current medications, past medical history, past social history, past surgical history and problem list    Imaging:    Results  No results found for this or any previous visit (from the past 1 hour(s)) ]  Lab Results   Component Value Date    PSA 2 6 11/04/2021     Lab Results   Component Value Date    CALCIUM 8 9 11/04/2021    K 4 6 11/04/2021    CO2 29 11/04/2021     11/04/2021    BUN 12 11/04/2021    CREATININE 0 91 11/04/2021     Lab Results   Component Value Date    WBC 7 04 11/04/2021    HGB 16 6 11/04/2021    HCT 49 6 (H) 11/04/2021    MCV 90 11/04/2021  11/04/2021       Please Note:  Voice dictation software has been used to create this document  There may be inadvertent transcriptions errors       Roxi Lopez

## 2022-08-25 NOTE — H&P
8/25/2022    Chief Complaint   Patient presents with    Follow-up       Assessment and Plan    64 y o  male manage by Dr Joelle Olivares    1  Bladder Cancer  · History and physical was performed for the patient's upcoming TURBT with instillation of mitomycin on 9/08/2022 with Dr Joelle Olivares  Technique, benefits, and risk of the procedure listed above were discussed with them today and informed written consent was obtained  All questions and concerns regarding surgery and perioperative expectations have been addressed and answered  No overall changes in their health since last visit  Denies any prior complications with anesthesia  · EKG and pre-operative blood work being completed today      History of Present Illness  Krissy Honeycutt is a 64 y o  male patient of Dr Joelle Olivares  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 by Dr Joelle Olivares in July 2021  Follow-up cystoscopy showed only 1 small papillary recurrence 0 5 cm right lateral wall and resected with a large cup forceps  Pathology showed noninvasive low-grade papillary urothelial carcinoma and muscularis propria was present and uninvolved  He refused BCG therapy  He had a surveillance cystoscopy on 07/22/2022 with Dr Joelle Olivares which showed a superficial papillary transition cell with recurrence with 2 lesions in the posterior wall, and a 2 mm lesion near the right lateral trigone  Recommendations are for a TURBT with mitomycin installation  Review of Systems   Constitutional: Negative for chills and fever  HENT: Negative for congestion and sore throat  Respiratory: Negative for cough and shortness of breath  Cardiovascular: Negative for chest pain and leg swelling  Gastrointestinal: Negative for abdominal pain, constipation, diarrhea, nausea and vomiting  Genitourinary: Positive for frequency  Negative for difficulty urinating, dysuria, hematuria, testicular pain and urgency     Musculoskeletal: Negative for back pain and gait problem  Skin: Negative for wound  Allergic/Immunologic: Negative for immunocompromised state  Neurological: Negative for dizziness, weakness and numbness  Hematological: Does not bruise/bleed easily  Vitals  Vitals:    08/25/22 0801   BP: 132/80   Weight: 89 4 kg (197 lb)   Height: 5' 10" (1 778 m)       Physical Exam  Vitals reviewed  Constitutional:       General: He is not in acute distress  Appearance: Normal appearance  He is not ill-appearing or toxic-appearing  HENT:      Head: Normocephalic and atraumatic  Nose: Nose normal       Mouth/Throat:      Mouth: Mucous membranes are moist       Pharynx: Oropharynx is clear  Eyes:      General: No scleral icterus  Conjunctiva/sclera: Conjunctivae normal    Cardiovascular:      Rate and Rhythm: Normal rate and regular rhythm  Pulses: Normal pulses  Heart sounds: Normal heart sounds  Pulmonary:      Effort: Pulmonary effort is normal  No respiratory distress  Breath sounds: Normal breath sounds  Abdominal:      General: Abdomen is flat  Bowel sounds are normal       Palpations: Abdomen is soft  Tenderness: There is no abdominal tenderness  There is no right CVA tenderness or left CVA tenderness  Hernia: No hernia is present  Musculoskeletal:      Cervical back: Normal range of motion  Right lower leg: No edema  Left lower leg: No edema  Skin:     General: Skin is warm and dry  Coloration: Skin is not jaundiced or pale  Neurological:      General: No focal deficit present  Mental Status: He is alert and oriented to person, place, and time  Mental status is at baseline  Gait: Gait normal    Psychiatric:         Mood and Affect: Mood normal          Behavior: Behavior normal          Thought Content:  Thought content normal          Judgment: Judgment normal          Past History  Past Medical History:   Diagnosis Date    [facility]     [facility]     DVT (deep venous thrombosis) ([facility] )      Social History     Socioeconomic History    Marital status: /Civil Union     Spouse name: None    Number of children: None    Years of education: None    Highest education level: None   Occupational History    Occupation: EMPLOYED   Tobacco Use    Smoking status: Current Every Day Smoker     Packs/day: 1 50     Years: 20 00     Pack years: 30 00    Smokeless tobacco: Never Used   Vaping Use    Vaping Use: Never used   Substance and Sexual Activity    Alcohol use: Yes     Comment: average 1 beer day    Drug use: Not Currently     Types: Marijuana    Sexual activity: Not Currently   Other Topics Concern    None   Social History Narrative    EMPLOYED         Social Determinants of Health     Financial Resource Strain: Not on file   Food Insecurity: Not on file   Transportation Needs: Not on file   Physical Activity: Not on file   Stress: Not on file   Social Connections: Not on file   Intimate Partner Violence: Not on file   Housing Stability: Not on file     Social History     Tobacco Use   Smoking Status Current Every Day Smoker    Packs/day: 1 50    Years: 20 00    Pack years: 30 00   Smokeless Tobacco Never Used     History reviewed  No pertinent family history      The following portions of the patient's history were reviewed and updated as appropriate allergies, current medications, past medical history, past social history, past surgical history and problem list    Imaging:    Results  No results found for this or any previous visit (from the past 1 hour(s)) ]  Lab Results   Component Value Date    PSA 2 6 11/04/2021     Lab Results   Component Value Date    CALCIUM 8 9 11/04/2021    K 4 6 11/04/2021    CO2 29 11/04/2021     11/04/2021    BUN 12 11/04/2021    CREATININE 0 91 11/04/2021     Lab Results   Component Value Date    WBC 7 04 11/04/2021    HGB 16 6 11/04/2021    HCT 49 6 (H) 11/04/2021    MCV 90 11/04/2021  11/04/2021       Please Note:  Voice dictation software has been used to create this document  There may be inadvertent transcriptions errors       Ameya Byron

## 2022-08-25 NOTE — PROGRESS NOTES
8/25/2022    Chief Complaint   Patient presents with    Follow-up       Assessment and Plan    64 y o  male manage by Dr Kwan Delong    1  Bladder Cancer  · History and physical was performed for the patient's upcoming TURBT with instillation of mitomycin on 9/08/2022 with Dr Kwan Delong  Technique, benefits, and risk of the procedure listed above were discussed with them today and informed written consent was obtained  All questions and concerns regarding surgery and perioperative expectations have been addressed and answered  No overall changes in their health since last visit  Denies any prior complications with anesthesia  · EKG and pre-operative blood work being completed today      History of Present Illness  Chi King is a 64 y o  male patient of Dr Kwan eDlong  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 by Dr Kwan Delong in July 2021  Follow-up cystoscopy showed only 1 small papillary recurrence 0 5 cm right lateral wall and resected with a large cup forceps  Pathology showed noninvasive low-grade papillary urothelial carcinoma and muscularis propria was present and uninvolved  He refused BCG therapy  He had a surveillance cystoscopy on 07/22/2022 with Dr Kwan Delong which showed a superficial papillary transition cell with recurrence with 2 lesions in the posterior wall, and a 2 mm lesion near the right lateral trigone  Recommendations are for a TURBT with mitomycin installation  Review of Systems   Constitutional: Negative for chills and fever  HENT: Negative for congestion and sore throat  Respiratory: Negative for cough and shortness of breath  Cardiovascular: Negative for chest pain and leg swelling  Gastrointestinal: Negative for abdominal pain, constipation, diarrhea, nausea and vomiting  Genitourinary: Positive for frequency  Negative for difficulty urinating, dysuria, hematuria, testicular pain and urgency     Musculoskeletal: Negative for back pain and gait problem  Skin: Negative for wound  Allergic/Immunologic: Negative for immunocompromised state  Neurological: Negative for dizziness, weakness and numbness  Hematological: Does not bruise/bleed easily  Vitals  Vitals:    08/25/22 0801   BP: 132/80   Weight: 89 4 kg (197 lb)   Height: 5' 10" (1 778 m)       Physical Exam  Vitals reviewed  Constitutional:       General: He is not in acute distress  Appearance: Normal appearance  He is not ill-appearing or toxic-appearing  HENT:      Head: Normocephalic and atraumatic  Nose: Nose normal       Mouth/Throat:      Mouth: Mucous membranes are moist       Pharynx: Oropharynx is clear  Eyes:      General: No scleral icterus  Conjunctiva/sclera: Conjunctivae normal    Cardiovascular:      Rate and Rhythm: Normal rate and regular rhythm  Pulses: Normal pulses  Heart sounds: Normal heart sounds  Pulmonary:      Effort: Pulmonary effort is normal  No respiratory distress  Breath sounds: Normal breath sounds  Abdominal:      General: Abdomen is flat  Bowel sounds are normal       Palpations: Abdomen is soft  Tenderness: There is no abdominal tenderness  There is no right CVA tenderness or left CVA tenderness  Hernia: No hernia is present  Musculoskeletal:      Cervical back: Normal range of motion  Right lower leg: No edema  Left lower leg: No edema  Skin:     General: Skin is warm and dry  Coloration: Skin is not jaundiced or pale  Neurological:      General: No focal deficit present  Mental Status: He is alert and oriented to person, place, and time  Mental status is at baseline  Gait: Gait normal    Psychiatric:         Mood and Affect: Mood normal          Behavior: Behavior normal          Thought Content:  Thought content normal          Judgment: Judgment normal          Past History  Past Medical History:   Diagnosis Date    [facility]     [facility] ([facility] )     DVT (deep venous thrombosis) ([facility] )      Social History     Socioeconomic History    Marital status: /Civil Union     Spouse name: None    Number of children: None    Years of education: None    Highest education level: None   Occupational History    Occupation: EMPLOYED   Tobacco Use    Smoking status: Current Every Day Smoker     Packs/day: 1 50     Years: 20 00     Pack years: 30 00    Smokeless tobacco: Never Used   Vaping Use    Vaping Use: Never used   Substance and Sexual Activity    Alcohol use: Yes     Comment: average 1 beer day    Drug use: Not Currently     Types: Marijuana    Sexual activity: Not Currently   Other Topics Concern    None   Social History Narrative    EMPLOYED         Social Determinants of Health     Financial Resource Strain: Not on file   Food Insecurity: Not on file   Transportation Needs: Not on file   Physical Activity: Not on file   Stress: Not on file   Social Connections: Not on file   Intimate Partner Violence: Not on file   Housing Stability: Not on file     Social History     Tobacco Use   Smoking Status Current Every Day Smoker    Packs/day: 1 50    Years: 20 00    Pack years: 30 00   Smokeless Tobacco Never Used     History reviewed  No pertinent family history      The following portions of the patient's history were reviewed and updated as appropriate allergies, current medications, past medical history, past social history, past surgical history and problem list    Imaging:    Results  No results found for this or any previous visit (from the past 1 hour(s)) ]  Lab Results   Component Value Date    PSA 2 6 11/04/2021     Lab Results   Component Value Date    CALCIUM 8 9 11/04/2021    K 4 6 11/04/2021    CO2 29 11/04/2021     11/04/2021    BUN 12 11/04/2021    CREATININE 0 91 11/04/2021     Lab Results   Component Value Date    WBC 7 04 11/04/2021    HGB 16 6 11/04/2021    HCT 49 6 (H) 11/04/2021    MCV 90 11/04/2021  11/04/2021       Please Note:  Voice dictation software has been used to create this document  There may be inadvertent transcriptions errors       Yokasta Crum

## 2022-08-26 ENCOUNTER — TELEPHONE (OUTPATIENT)
Dept: UROLOGY | Facility: CLINIC | Age: 57
End: 2022-08-26

## 2022-08-26 ENCOUNTER — CONSULT (OUTPATIENT)
Dept: INTERNAL MEDICINE CLINIC | Facility: CLINIC | Age: 57
End: 2022-08-26
Payer: COMMERCIAL

## 2022-08-26 ENCOUNTER — TELEPHONE (OUTPATIENT)
Dept: INTERNAL MEDICINE CLINIC | Facility: CLINIC | Age: 57
End: 2022-08-26

## 2022-08-26 VITALS
OXYGEN SATURATION: 97 % | HEIGHT: 70 IN | HEART RATE: 80 BPM | DIASTOLIC BLOOD PRESSURE: 78 MMHG | TEMPERATURE: 97.4 F | WEIGHT: 194.38 LBS | SYSTOLIC BLOOD PRESSURE: 134 MMHG | BODY MASS INDEX: 27.83 KG/M2

## 2022-08-26 DIAGNOSIS — F17.200 SMOKING: ICD-10-CM

## 2022-08-26 DIAGNOSIS — Z86.718 HISTORY OF DVT (DEEP VEIN THROMBOSIS): ICD-10-CM

## 2022-08-26 DIAGNOSIS — I10 PRIMARY HYPERTENSION: ICD-10-CM

## 2022-08-26 DIAGNOSIS — Z79.01 CHRONIC ANTICOAGULATION: ICD-10-CM

## 2022-08-26 DIAGNOSIS — R94.31 ABNORMAL EKG: ICD-10-CM

## 2022-08-26 DIAGNOSIS — C67.3 MALIGNANT NEOPLASM OF ANTERIOR WALL OF BLADDER (HCC): ICD-10-CM

## 2022-08-26 DIAGNOSIS — Z01.818 VISIT FOR PRE-OPERATIVE EXAMINATION: Primary | ICD-10-CM

## 2022-08-26 DIAGNOSIS — N32.89 BLADDER MASS: ICD-10-CM

## 2022-08-26 LAB — BACTERIA UR CULT: NORMAL

## 2022-08-26 PROCEDURE — 99244 OFF/OP CNSLTJ NEW/EST MOD 40: CPT | Performed by: INTERNAL MEDICINE

## 2022-08-26 RX ORDER — ENOXAPARIN SODIUM 100 MG/ML
40 INJECTION SUBCUTANEOUS
Qty: 2 ML | Refills: 0 | Status: SHIPPED | OUTPATIENT
Start: 2022-08-26 | End: 2022-10-14

## 2022-08-26 NOTE — TELEPHONE ENCOUNTER
Kristin Gaylechristine was checking out today and he needs a stress echo to be cleared for surgery on 9-8-22  I called central scheduling for this, they could not get him in until Monday and he refused this  He wanted it done today and or Saturday or Sunday or after 6 PM and or before he starts in the early morning  He was very abrupt with the central scheduler and me  He said to forget it and he would have this done after the new year or thereafter  He left without his paperwork and follow up appointment

## 2022-08-26 NOTE — PATIENT INSTRUCTIONS
Cigarette Smoking and Your Health   AMBULATORY CARE:   Risks to your health if you smoke:  Nicotine and other chemicals found in tobacco and e-cigarettes can damage every cell in your body  Even if you are a light smoker, you have an increased risk for cancer, heart disease, and lung disease  If you are pregnant or have diabetes, smoking increases your risk for complications  Nicotine can affect an adolescent's developing brain  This can lead to trouble thinking, learning, or paying attention  Benefits to your health if you stop smoking:   · You decrease respiratory symptoms such as coughing, wheezing, and shortness of breath  · You reduce your risk for cancers of the lung, mouth, throat, kidney, bladder, pancreas, stomach, and cervix  If you already have cancer, you increase the benefits of chemotherapy  You also reduce your risk for cancer returning or a second cancer from developing  · You reduce your risk for heart disease, blood clots, heart attack, and stroke  · You reduce your risk for lung infections, and diseases such as pneumonia, asthma, chronic bronchitis, and emphysema  · Your circulation improves  More oxygen can be delivered to your body  If you have diabetes, you lower your risk for complications, such as kidney, artery, and eye diseases  You also lower your risk for nerve damage  Nerve damage can lead to amputations, poor vision, and blindness  · You improve your body's ability to heal and to fight infections  · An adolescent can help his or her brain and body develop in a healthy way  Talk to your adolescent about all the health risks of nicotine  If you can, start talking about nicotine when your child is younger than 12 years  This may make it easier for him or her not to start using nicotine as a teenager or adult  Explain to him or her that it is best never to start  It can be hard to try to quit later      Benefits to the health of others if you stop smoking:  Tobacco is harmful to nonsmokers who breathe in your secondhand smoke  The following are ways the health of others around you may improve when you stop smoking:  · You lower the risks for lung cancer and heart disease in nonsmoking adults  · If you are pregnant, you lower the risk for miscarriage, early delivery, low birth weight, and stillbirth  You also lower your baby's risk for SIDS, obesity, developmental delay, and neurobehavioral problems, such as ADHD  · If you have children, you lower their risk for ear infections, colds, pneumonia, bronchitis, and asthma  Follow up with your doctor as directed:  Write down your questions so you remember to ask them during your visits  For support and more information:   · American [employer] Association  [address] [facility]  [place] [address] [facility]  Phone: [facility] [address]  Phone: 5- 524 - [phone]  Web Address: Oliva King  [facility]    · Smokefree  gov  Phone: 1- 149 - 569-4463  Web Address: www smokefree  [facility] 21 2022 Information is for End User's use only and may not be sold, redistributed or otherwise used for commercial purposes  All illustrations and images included in CareNotes® are the copyrighted property of A D A M , Inc  or [address] [place] [address]  The above information is an  only  It is not intended as medical advice for individual conditions or treatments  Talk to your doctor, nurse or pharmacist before following any medical regimen to see if it is safe and effective for you  Weight Management   AMBULATORY CARE:   Why it is important to manage your weight:  Being overweight increases your risk of health conditions such as heart disease, high blood pressure, type 2 diabetes, and certain types of cancer  It can also increase your risk for osteoarthritis, sleep apnea, and other respiratory problems  Aim for a slow, steady weight loss  Even a small amount of weight loss can lower your risk of health problems    Risks of being overweight: Extra weight can cause many health problems, including the following:  · Diabetes (high blood sugar level)    · High blood pressure or high cholesterol    · Heart disease    · Stroke    · Gallbladder or liver disease    · Cancer of the colon, breast, prostate, liver, or kidney    · Sleep apnea    · Arthritis or gout    Screening  is done to check for health conditions before you have signs or symptoms  If you are 28to 79years old, your blood sugar level may be checked every 3 years for signs of prediabetes or diabetes  Your healthcare provider will check your blood pressure at each visit  High blood pressure can lead to a stroke or other problems  Your provider may check for signs of heart disease, cancer, or other health problems  How to lose weight safely:  A safe and healthy way to lose weight is to eat fewer calories and get regular exercise  · You can lose up about 1 pound a week by decreasing the number of calories you eat by 500 calories each day  You can decrease calories by eating smaller portion sizes or by cutting out high-calorie foods  Read labels to find out how many calories are in the foods you eat  · You can also burn calories with exercise such as walking, swimming, or biking  You will be more likely to keep weight off if you make these changes part of your lifestyle  Exercise at least 30 minutes per day on most days of the week  You can also fit in more physical activity by taking the stairs instead of the elevator or parking farther away from stores  Ask your healthcare provider about the best exercise plan for you  Healthy meal plan for weight management:  A healthy meal plan includes a variety of foods, contains fewer calories, and helps you stay healthy  A healthy meal plan includes the following:     · Eat whole-grain foods more often  A healthy meal plan should contain fiber  Fiber is the part of grains, fruits, and vegetables that is not broken down by your body  Whole-grain foods are healthy and provide extra fiber in your diet  Some examples of whole-grain foods are whole-wheat breads and pastas, oatmeal, brown rice, and bulgur  · Eat a variety of vegetables every day  Include dark, leafy greens such as spinach, kale, rose greens, and mustard greens  Eat yellow and orange vegetables such as carrots, sweet potatoes, and winter squash  · Eat a variety of fruits every day  Choose fresh or canned fruit (canned in its own juice or light syrup) instead of juice  Fruit juice has very little or no fiber  · Eat low-fat dairy foods  Drink fat-free (skim) milk or 1% milk  Eat fat-free yogurt and low-fat cottage cheese  Try low-fat cheeses such as mozzarella and other reduced-fat cheeses  · Choose meat and other protein foods that are low in fat  Choose beans or other legumes such as split peas or lentils  Choose fish, skinless poultry (chicken or turkey), or lean cuts of red meat (beef or pork)  Before you cook meat or poultry, cut off any visible fat  · Use less fat and oil  Try baking foods instead of frying them  Add less fat, such as margarine, sour cream, regular salad dressing and mayonnaise to foods  Eat fewer high-fat foods  Some examples of high-fat foods include french fries, doughnuts, ice cream, and cakes  · Eat fewer sweets  Limit foods and drinks that are high in sugar  This includes candy, cookies, regular soda, and sweetened drinks  Ways to decrease calories:   · Eat smaller portions  ? Use a small plate with smaller servings  ? Do not eat second helpings  ? When you eat at a restaurant, ask for a box and place half of your meal in the box before you eat  ? Share an entrée with someone else  · Replace high-calorie snacks with healthy, low-calorie snacks  ? Choose fresh fruit, vegetables, fat-free rice cakes, or air-popped popcorn instead of potato chips, nuts, or chocolate  ?  Choose water or calorie-free drinks instead of soda or sweetened drinks  · Do not shop for groceries when you are hungry  You may be more likely to make unhealthy food choices  Take a grocery list of healthy foods and shop after you have eaten  · Eat regular meals  Do not skip meals  Skipping meals can lead to overeating later in the day  This can make it harder for you to lose weight  Eat a healthy snack in place of a meal if you do not have time to eat a regular meal  Talk with a dietitian to help you create a meal plan and schedule that is right for you  Other things to consider as you try to lose weight:   · Be aware of situations that may give you the urge to overeat, such as eating while watching television  Find ways to avoid these situations  For example, read a book, go for a walk, or do crafts  · Meet with a weight loss support group or friends who are also trying to lose weight  This may help you stay motivated to continue working on your weight loss goals  © Copyright [employer] 2022 Information is for End User's use only and may not be sold, redistributed or otherwise used for commercial purposes  All illustrations and images included in CareNotes® are the copyrighted property of A D A M , Inc  or [address] [place] [address]  The above information is an  only  It is not intended as medical advice for individual conditions or treatments  Talk to your doctor, nurse or pharmacist before following any medical regimen to see if it is safe and effective for you  Low Fat Diet   AMBULATORY CARE:   A low-fat diet  is an eating plan that is low in total fat, unhealthy fat, and cholesterol  You may need to follow a low-fat diet if you have trouble digesting or absorbing fat  You may also need to follow this diet if you have high cholesterol  You can also lower your cholesterol by increasing the amount of fiber in your diet  Soluble fiber is a type of fiber that helps to decrease cholesterol levels     Different types of fat in food:   · Limit unhealthy fats  A diet that is high in cholesterol, saturated fat, and trans fat may cause unhealthy cholesterol levels  Unhealthy cholesterol levels increase your risk of heart disease  ? Cholesterol:  Limit intake of cholesterol to less than 200 mg per day  Cholesterol is found in meat, eggs, and dairy  ? Saturated fat:  Limit saturated fat to less than 7% of your total daily calories  Ask your dietitian how many calories you need each day  Saturated fat is found in butter, cheese, ice cream, whole milk, and palm oil  Saturated fat is also found in meat, such as beef, pork, chicken skin, and processed meats  Processed meats include sausage, hot dogs, and bologna  ? Trans fat:  Avoid trans fat as much as possible  Trans fat is used in fried and baked foods  Foods that say trans fat free on the label may still have up to 0 5 grams of trans fat per serving  · Include healthy fats  Replace foods that are high in saturated and trans fat with foods high in healthy fats  This may help to decrease high cholesterol levels  ? Monounsaturated fats: These are found in avocados, nuts, and vegetable oils, such as olive, canola, and sunflower oil  ? Polyunsaturated fats: These can be found in vegetable oils, such as soybean or corn oil  Omega-3 fats can help to decrease the risk of heart disease  Omega-3 fats are found in fish, such as salmon, herring, trout, and tuna  Omega-3 fats can also be found in plant foods, such as walnuts, flaxseed, soybeans, and canola oil  Foods to limit or avoid:   · Grains:      ? Snacks that are made with partially hydrogenated oils, such as chips, regular crackers, and butter-flavored popcorn    ? High-fat baked goods, such as biscuits, croissants, doughnuts, pies, cookies, and pastries    · Dairy:      ? Whole milk, 2% milk, and yogurt and ice cream made with whole milk    ?  Half and half creamer, heavy cream, and whipping cream    ? Cheese, cream cheese, and sour cream    · Meats and proteins:      ? High-fat cuts of meat (T-bone steak, regular hamburger, and ribs)    ? Fried meat, poultry (turkey and chicken), and fish    ? Poultry (chicken and turkey) with skin    ? Cold cuts (salami or bologna), hot dogs, kapoor, and sausage    ? Whole eggs and egg yolks    · Vegetables and fruits with added fat:      ? Fried vegetables or vegetables in butter or high-fat sauces, such as cream or cheese sauces    ? Fried fruit or fruit served with butter or cream    · Fats:      ? Butter, stick margarine, and shortening    ? Coconut, palm oil, and palm kernel oil    Foods to include:   · Grains:      ? Whole-grain breads, cereals, pasta, and brown rice    ? Low-fat crackers and pretzels    · Vegetables and fruits:      ? Fresh, frozen, or canned vegetables (no salt or low-sodium)    ? Fresh, frozen, dried, or canned fruit (canned in light syrup or fruit juice)    ? Avocado    · Low-fat dairy products:      ? Nonfat (skim) or 1% milk    ? Nonfat or low-fat cheese, yogurt, and cottage cheese    · Meats and proteins:      ? Chicken or turkey with no skin    ? Baked or broiled fish    ? Lean beef and pork (loin, round, extra lean hamburger)    ? Beans and peas, unsalted nuts, soy products    ? Egg whites and substitutes    ? Seeds and nuts    · Fats:      ? Unsaturated oil, such as canola, olive, peanut, soybean, or sunflower oil    ? Soft or liquid margarine and vegetable oil spread    ? Low-fat salad dressing    Other ways to decrease fat:   · Read food labels before you buy foods  Choose foods that have less than 30% of calories from fat  Choose low-fat or fat-free dairy products  Remember that fat free does not mean calorie free  These foods still contain calories, and too many calories can lead to weight gain  · Trim fat from meat and avoid fried food  Trim all visible fat from meat before you cook it  Remove the skin from poultry   Do not ojeda meat, fish, or poultry  Bake, roast, boil, or broil these foods instead  Avoid fried foods  Eat a baked potato instead of Western Rebekah fries  Steam vegetables instead of sautéing them in butter  · Add less fat to foods  Use imitation kapoor bits on salads and baked potatoes instead of regular kapoor bits  Use fat-free or low-fat salad dressings instead of regular dressings  Use low-fat or nonfat butter-flavored topping instead of regular butter or margarine on popcorn and other foods  Ways to decrease fat in recipes:  Replace high-fat ingredients with low-fat or nonfat ones  This may cause baked goods to be drier than usual  You may need to use nonfat cooking spray on pans to prevent food from sticking  You also may need to change the amount of other ingredients, such as water, in the recipe  Try the following:  · Use low-fat or light margarine instead of regular margarine or shortening  · Use lean ground turkey breast or chicken, or lean ground beef (less than 5% fat) instead of hamburger  · Add 1 teaspoon of canola oil to 8 ounces of skim milk instead of using cream or half and half  · Use grated zucchini, carrots, or apples in breads instead of coconut  · Use blenderized, low-fat cottage cheese, plain tofu, or low-fat ricotta cheese instead of cream cheese  · Use 1 egg white and 1 teaspoon of canola oil, or use ¼ cup (2 ounces) of fat-free egg substitute instead of a whole egg  · Replace half of the oil that is called for in a recipe with applesauce when you bake  Use 3 tablespoons of cocoa powder and 1 tablespoon of canola oil instead of a square of baking chocolate  How to increase fiber:  Eat enough high-fiber foods to get 20 to 30 grams of fiber every day  Slowly increase your fiber intake to avoid stomach cramps, gas, and other problems  · Eat 3 ounces of whole-grain foods each day  An ounce is about 1 slice of bread  Eat whole-grain breads, such as whole-wheat bread   Whole wheat, whole-wheat flour, or other whole grains should be listed as the first ingredient on the food label  Replace white flour with whole-grain flour or use half of each in recipes  Whole-grain flour is heavier than white flour, so you may have to add more yeast or baking powder  · Eat a high-fiber cereal for breakfast   Oatmeal is a good source of soluble fiber  Look for cereals that have bran or fiber in the name  Choose whole-grain products, such as brown rice, barley, and whole-wheat pasta  · Eat more beans, peas, and lentils  For example, add beans to soups or salads  Eat at least 5 cups of fruits and vegetables each day  Eat fruits and vegetables with the peel because the peel is high in fiber  © Copyright [address] 2022 Information is for End User's use only and may not be sold, redistributed or otherwise used for commercial purposes  All illustrations and images included in CareNotes® are the copyrighted property of A D A M , Inc  or [address] [place] [address]  The above information is an  only  It is not intended as medical advice for individual conditions or treatments  Talk to your doctor, nurse or pharmacist before following any medical regimen to see if it is safe and effective for you  Heart Healthy Diet   AMBULATORY CARE:   A heart healthy diet  is an eating plan low in unhealthy fats and sodium (salt)  The plan is high in healthy fats and fiber  A heart healthy diet helps improve your cholesterol levels and lowers your risk for heart disease and stroke  A dietitian will teach you how to read and understand food labels  Heart healthy diet guidelines to follow:   · Choose foods that contain healthy fats  ? Unsaturated fats  include monounsaturated and polyunsaturated fats  Unsaturated fat is found in foods such as soybean, canola, olive, corn, and safflower oils  It is also found in soft tub margarine that is made with liquid vegetable oil      ? Omega-3 fat  is found in certain fish, such as salmon, tuna, and trout, and in walnuts and flaxseed  Eat fish high in omega-3 fats at least 2 times a week  · Get 20 to 30 grams of fiber each day  Fruits, vegetables, whole-grain foods, and legumes (cooked beans) are good sources of fiber  · Limit or do not have unhealthy fats  ? Cholesterol  is found in animal foods, such as eggs and lobster, and in dairy products made from whole milk  Limit cholesterol to less than 200 mg each day  ? Saturated fat  is found in meats, such as kapoor and hamburger  It is also found in chicken or turkey skin, whole milk, and butter  Limit saturated fat to less than 7% of your total daily calories  ? Trans fat  is found in packaged foods, such as potato chips and cookies  It is also in hard margarine, some fried foods, and shortening  Do not eat foods that contain trans fats  · Limit sodium as directed  You may be told to limit sodium to 2,000 to 2,300 mg each day  Choose low-sodium or no-salt-added foods  Add little or no salt to food you prepare  Use herbs and spices in place of salt  Include the following in your heart healthy plan:  Ask your dietitian or healthcare provider how many servings to have from each of the following food groups:  · Grains:      ? Whole-wheat breads, cereals, and pastas, and brown rice    ? Low-fat, low-sodium crackers and chips    · Vegetables:      ? Broccoli, green beans, green peas, and spinach    ? Collards, kale, and lima beans    ? Carrots, sweet potatoes, tomatoes, and peppers    ? Canned vegetables with no salt added    · Fruits:      ? Bananas, peaches, pears, and pineapple    ? Grapes, raisins, and dates    ? Oranges, tangerines, grapefruit, orange juice, and grapefruit juice    ? Apricots, mangoes, melons, and papaya    ? Raspberries and strawberries    ? Canned fruit with no added sugar    · Low-fat dairy:      ?  Nonfat (skim) milk, 1% milk, and low-fat almond, cashew, or soy milks fortified with calcium    ? Low-fat cheese, regular or frozen yogurt, and cottage cheese    · Meats and proteins:      ? Lean cuts of beef and pork (loin, leg, round), skinless chicken and turkey    ? Legumes, soy products, egg whites, or nuts    Limit or do not include the following in your heart healthy plan:   · Unhealthy fats and oils:      ? Whole or 2% milk, cream cheese, sour cream, or cheese    ? High-fat cuts of beef (T-bone steaks, ribs), chicken or turkey with skin, and organ meats such as liver    ? Butter, stick margarine, shortening, and cooking oils such as coconut or palm oil    · Foods and liquids high in sodium:      ? Packaged foods, such as frozen dinners, cookies, macaroni and cheese, and cereals with more than 300 mg of sodium per serving    ? Vegetables with added sodium, such as instant potatoes, vegetables with added sauces, or regular canned vegetables    ? Cured or smoked meats, such as hot dogs, kapoor, and sausage    ? High-sodium ketchup, barbecue sauce, salad dressing, pickles, olives, soy sauce, or miso    · Foods and liquids high in sugar:      ? Candy, cake, cookies, pies, or doughnuts    ? Soft drinks (soda), sports drinks, or sweetened tea    ? Canned or dry mixes for cakes, soups, sauces, or gravies    Other healthy heart guidelines:   · Do not smoke  Nicotine and other chemicals in cigarettes and cigars can cause lung and heart damage  Ask your healthcare provider for information if you currently smoke and need help to quit  E-cigarettes or smokeless tobacco still contain nicotine  Talk to your healthcare provider before you use these products  · Limit or do not drink alcohol as directed  Alcohol can damage your heart and raise your blood pressure  Your healthcare provider may give you specific daily and weekly limits  The general recommended limit is 1 drink a day for women 21 or older and for men 72 or older  Do not have more than 3 drinks in a day or 7 in a week   The recommended limit is 2 drinks a day for men 24to 59years of age  Do not have more than 4 drinks in a day or 14 in a week  A drink of alcohol is 12 ounces of beer, 5 ounces of wine, or 1½ ounces of liquor  · Exercise regularly  Exercise can help you maintain a healthy weight and improve your blood pressure and cholesterol levels  Regular exercise can also decrease your risk for heart problems  Ask your healthcare provider about the best exercise plan for you  Do not start an exercise program without asking your healthcare provider  Follow up with your doctor or cardiologist as directed:  Write down your questions so you remember to ask them during your visits  © Copyright [employer] 2022 Information is for End User's use only and may not be sold, redistributed or otherwise used for commercial purposes  All illustrations and images included in CareNotes® are the copyrighted property of A D A M , Inc  or Rob Palmer   The above information is an  only  It is not intended as medical advice for individual conditions or treatments  Talk to your doctor, nurse or pharmacist before following any medical regimen to see if it is safe and effective for you

## 2022-08-26 NOTE — TELEPHONE ENCOUNTER
Voicemail message left for patients spouse with negative urine culture results as patients number listed did not have a VM box set up  Asked them to call back with any questions

## 2022-08-26 NOTE — TELEPHONE ENCOUNTER
Returned call to patient and will callback on Monday to schedule cardiac clearance for new sx date 1/16

## 2022-08-26 NOTE — TELEPHONE ENCOUNTER
Patient called in about his surgery  He saw his PCP  He needs to get a stress test done prior to surgery, however he can not take anymore days off so he needs to r/s the OR      Please call patient back at [phone]

## 2022-08-29 ENCOUNTER — TELEPHONE (OUTPATIENT)
Dept: INTERNAL MEDICINE CLINIC | Facility: CLINIC | Age: 57
End: 2022-08-29

## 2022-08-29 NOTE — TELEPHONE ENCOUNTER
Called patient and left a voicemail for patient to call me back to schedule their surgery  Patient to call me at [phone]

## 2022-08-29 NOTE — TELEPHONE ENCOUNTER
Farzana Valencia scheduled deep for a stress test at [facility] tomorrow at 10 am     They want to know if he needs to stop any meds for this test to be done  He decided that he wants the surgery done and he was upset on Friday  Let me know your answer and I will also include this message to Farzana Valencia      thanks

## 2022-08-30 ENCOUNTER — HOSPITAL ENCOUNTER (OUTPATIENT)
Dept: NON INVASIVE DIAGNOSTICS | Facility: HOSPITAL | Age: 57
Discharge: HOME | End: 2022-08-30
Payer: COMMERCIAL

## 2022-08-30 VITALS — HEIGHT: 70 IN | WEIGHT: 194 LBS | BODY MASS INDEX: 27.77 KG/M2

## 2022-08-30 DIAGNOSIS — Z86.718 HISTORY OF DVT (DEEP VEIN THROMBOSIS): ICD-10-CM

## 2022-08-30 DIAGNOSIS — Z79.01 CHRONIC ANTICOAGULATION: ICD-10-CM

## 2022-08-30 DIAGNOSIS — C67.3 MALIGNANT NEOPLASM OF ANTERIOR WALL OF BLADDER (HCC): ICD-10-CM

## 2022-08-30 DIAGNOSIS — F17.200 SMOKING: ICD-10-CM

## 2022-08-30 DIAGNOSIS — R94.39 ABNORMAL STRESS TEST: Primary | ICD-10-CM

## 2022-08-30 DIAGNOSIS — N32.89 BLADDER MASS: ICD-10-CM

## 2022-08-30 DIAGNOSIS — I10 PRIMARY HYPERTENSION: ICD-10-CM

## 2022-08-30 DIAGNOSIS — R94.31 ABNORMAL EKG: ICD-10-CM

## 2022-08-30 DIAGNOSIS — Z01.818 VISIT FOR PRE-OPERATIVE EXAMINATION: ICD-10-CM

## 2022-08-30 LAB
E WAVE DECELERATION TIME: 357 MS
MAX HR PERCENT: 67 %
MAX HR: 110 BPM
MV E'TISSUE VEL-SEP: 7 CM/S
MV PEAK A VEL: 0.82 M/S
MV PEAK E VEL: 64 CM/S
MV STENOSIS PRESSURE HALF TIME: 104 MS
MV VALVE AREA P 1/2 METHOD: 2.12 CM2
PA SYSTOLIC PRESSURE: 14 MMHG
POST LVEF: 60 %
RA PRESSURE ESTIMATED: 5 MMHG
RATE PRESSURE PRODUCT: NORMAL
RV PSP: 14 MMHG
SL CV LV EF: 60
SL CV STRESS RECOVERY BP: NORMAL MMHG
SL CV STRESS RECOVERY HR: 68 BPM
SL CV STRESS RECOVERY O2 SAT: 97 %
SL CV STRESS STAGE REACHED: 4
STRESS ANGINA INDEX: 0
STRESS BASELINE BP: NORMAL MMHG
STRESS BASELINE HR: 72 BPM
STRESS DUKE TREADMILL SCORE: 9
STRESS O2 SAT REST: 98 %
STRESS PEAK HR: 110 BPM
STRESS POST ESTIMATED WORKLOAD: 10.2 METS
STRESS POST EXERCISE DUR MIN: 9 MIN
STRESS POST EXERCISE DUR SEC: 8 SEC
STRESS POST O2 SAT PEAK: 98 %
STRESS POST PEAK BP: 220 MMHG
STRESS ST DEPRESSION: 0 MM
TR MAX PG: 9 MMHG
TR PEAK VELOCITY: 1.5 M/S
TRICUSPID ANNULAR PLANE SYSTOLIC EXCURSION: 2.3 CM
TRICUSPID VALVE PEAK REGURGITATION VELOCITY: 1.53 M/S

## 2022-08-30 PROCEDURE — 93350 STRESS TTE ONLY: CPT

## 2022-08-30 PROCEDURE — 93350 STRESS TTE ONLY: CPT | Performed by: INTERNAL MEDICINE

## 2022-08-30 NOTE — RESULT ENCOUNTER NOTE
Patient is scheduled for tomorrow 8-31-22 at [facility] 9 am   Patient is aware of this test,

## 2022-08-31 ENCOUNTER — HOSPITAL ENCOUNTER (OUTPATIENT)
Dept: RADIOLOGY | Facility: HOSPITAL | Age: 57
Discharge: HOME | End: 2022-08-31

## 2022-08-31 DIAGNOSIS — R94.39 ABNORMAL STRESS TEST: ICD-10-CM

## 2022-09-01 ENCOUNTER — TELEPHONE (OUTPATIENT)
Dept: INTERNAL MEDICINE CLINIC | Facility: CLINIC | Age: 57
End: 2022-09-01

## 2022-09-01 ENCOUNTER — HOSPITAL ENCOUNTER (OUTPATIENT)
Dept: RADIOLOGY | Facility: HOSPITAL | Age: 57
Discharge: HOME | End: 2022-09-01

## 2022-09-01 ENCOUNTER — HOSPITAL ENCOUNTER (OUTPATIENT)
Dept: RADIOLOGY | Facility: HOSPITAL | Age: 57
End: 2022-09-01
Payer: COMMERCIAL

## 2022-09-01 ENCOUNTER — HOSPITAL ENCOUNTER (OUTPATIENT)
Dept: NON INVASIVE DIAGNOSTICS | Facility: HOSPITAL | Age: 57
Discharge: HOME | End: 2022-09-01
Payer: COMMERCIAL

## 2022-09-01 VITALS — HEIGHT: 70 IN | WEIGHT: 194 LBS | BODY MASS INDEX: 27.77 KG/M2

## 2022-09-01 DIAGNOSIS — R94.39 ABNORMAL STRESS TEST: ICD-10-CM

## 2022-09-01 LAB
MAX HR PERCENT: 63 %
MAX HR: 104 BPM
NUC STRESS EJECTION FRACTION: 54 %
RATE PRESSURE PRODUCT: NORMAL
SL CV REST NUCLEAR ISOTOPE DOSE: 10.1 MCI
SL CV STRESS NUCLEAR ISOTOPE DOSE: 32.2 MCI
SL CV STRESS RECOVERY BP: NORMAL MMHG
SL CV STRESS RECOVERY HR: 79 BPM
SL CV STRESS RECOVERY O2 SAT: 98 %
STRESS ANGINA INDEX: 0
STRESS BASELINE BP: NORMAL MMHG
STRESS BASELINE HR: 54 BPM
STRESS O2 SAT REST: 98 %
STRESS PEAK HR: 104 BPM
STRESS POST ESTIMATED WORKLOAD: 1.7 METS
STRESS POST O2 SAT PEAK: 99 %
STRESS POST PEAK BP: 180 MMHG
STRESS ST DEPRESSION: 0 MM
STRESS ST ELEVATION: 0 MM
STRESS/REST PERFUSION RATIO: 1.12

## 2022-09-01 PROCEDURE — 78452 HT MUSCLE IMAGE SPECT MULT: CPT

## 2022-09-01 PROCEDURE — G1004 CDSM NDSC: HCPCS

## 2022-09-01 PROCEDURE — 93018 CV STRESS TEST I&R ONLY: CPT | Performed by: INTERNAL MEDICINE

## 2022-09-01 PROCEDURE — 93017 CV STRESS TEST TRACING ONLY: CPT

## 2022-09-01 PROCEDURE — 78452 HT MUSCLE IMAGE SPECT MULT: CPT | Performed by: INTERNAL MEDICINE

## 2022-09-01 PROCEDURE — 93016 CV STRESS TEST SUPVJ ONLY: CPT | Performed by: INTERNAL MEDICINE

## 2022-09-01 PROCEDURE — A9502 TC99M TETROFOSMIN: HCPCS

## 2022-09-01 RX ADMIN — REGADENOSON 0.4 MG: 0.08 INJECTION, SOLUTION INTRAVENOUS at 10:28

## 2022-09-01 NOTE — PRE-PROCEDURE INSTRUCTIONS
Pre-Surgery Instructions:   Medication Instructions    Eliquis 5 MG Instructions provided by Kirstie dose 9/1    enoxaparin (LOVENOX) 40 mg/0 4 mL Instructions provided by Brayan 9/2 last dose 9/6    metoprolol succinate (Toprol XL) 50 mg 24 hr tablet Take day of surgery  Have you had / have a sore throat? No  Have you had / have a cough less than 1 week? No  Have you had / have a fever greater than 100 0 - 100  4? No  Are you experiencing any shortness of breath? No    Review with patient's wife Rodolfo Rosenberg via phone medications and showering instructions  Advised don't take NSAID's, ok tylenol products  Advised ASC call with surgery schedule time, nothing eat or drink after midnight  Verbalized understanding

## 2022-09-01 NOTE — TELEPHONE ENCOUNTER
Norma Garcia at Dr Opal Armstrong office called to let us know patient was cleared for surgery   He had his echo/stress test done (in Epic)    If you have any other questions the offices call back is [phone]

## 2022-09-01 NOTE — TELEPHONE ENCOUNTER
Halle Schreiber called the office back today, he was afraid to  phone yesterday, thinking it was bad news  I called Dr Delbert Anders office, spoke to [employer]  She was going to contact surgery scheduler with addendum consult note, pt being cleared for surgery  I called pt, no answer and no voicemail set up              ----- Message from Jerica Gentile, DO sent at 9/1/2022 12:42 PM EDT -----  Call pt test is ok and ok for surgery  Call surgeon and tell them test is ok and pt ok for sx as planned

## 2022-09-06 ENCOUNTER — ANESTHESIA EVENT (OUTPATIENT)
Dept: PERIOP | Facility: HOSPITAL | Age: 57
End: 2022-09-06
Payer: COMMERCIAL

## 2022-09-06 NOTE — ANESTHESIA PREPROCEDURE EVALUATION
Procedure:  TRANSURETHRAL RESECTION OF BLADDER TUMOR (TURBT) and mitomycin installation (N/A Bladder)    Prior GA LMA 4  TTE Stress: EF 60% with G1DD, AV with no significant stenosis but be was recommended a formal resting TTE  Perfusion Stress: No evidence of ischemia   Eliquis for hx DVT  Metoprolol     Denies the following: CP/SOB with exertion, asthma, COPD, GLENN, stroke/TIA, seizure    Relevant Problems   CARDIO   (+) Hypertension      NEURO/PSYCH   (+) History of DVT (deep vein thrombosis)      PULMONARY   (+) Smoking      Genitourinary   (+) Malignant neoplasm of anterior wall of bladder (HCC)        Physical Exam    Airway    Mallampati score: II  TM Distance: >3 FB  Neck ROM: full     Dental       Cardiovascular      Pulmonary      Other Findings        Anesthesia Plan  ASA Score- 2     Anesthesia Type- IV sedation with anesthesia with ASA Monitors  Additional Monitors:   Airway Plan:           Plan Factors-Exercise tolerance (METS): >4 METS  Chart reviewed  EKG reviewed  Existing labs reviewed  Patient summary reviewed  Patient is a current smoker  Obstructive sleep apnea risk education given perioperatively  Induction-     Postoperative Plan-     Informed Consent- Anesthetic plan and risks discussed with patient  I personally reviewed this patient with the CRNA  Discussed and agreed on the Anesthesia Plan with the CRNA  Thierry Baires

## 2022-09-08 ENCOUNTER — HOSPITAL ENCOUNTER (OUTPATIENT)
Facility: HOSPITAL | Age: 57
Setting detail: OUTPATIENT SURGERY
Discharge: HOME | End: 2022-09-08
Attending: UROLOGY | Admitting: UROLOGY
Payer: COMMERCIAL

## 2022-09-08 ENCOUNTER — ANESTHESIA (OUTPATIENT)
Dept: PERIOP | Facility: HOSPITAL | Age: 57
End: 2022-09-08
Payer: COMMERCIAL

## 2022-09-08 VITALS
OXYGEN SATURATION: 97 % | SYSTOLIC BLOOD PRESSURE: 122 MMHG | TEMPERATURE: 98 F | DIASTOLIC BLOOD PRESSURE: 67 MMHG | RESPIRATION RATE: 16 BRPM | HEART RATE: 61 BPM

## 2022-09-08 DIAGNOSIS — C67.9 MALIGNANT NEOPLASM OF URINARY BLADDER, UNSPECIFIED SITE (HCC): ICD-10-CM

## 2022-09-08 PROCEDURE — 52234 CYSTOSCOPY AND TREATMENT: CPT | Performed by: UROLOGY

## 2022-09-08 PROCEDURE — 88307 TISSUE EXAM BY PATHOLOGIST: CPT | Performed by: PATHOLOGY

## 2022-09-08 RX ORDER — LEVOFLOXACIN 5 MG/ML
750 INJECTION, SOLUTION INTRAVENOUS ONCE
Status: COMPLETED | OUTPATIENT
Start: 2022-09-08 | End: 2022-09-08

## 2022-09-08 RX ORDER — SORBITOL 30 G/1000ML
IRRIGANT IRRIGATION AS NEEDED
Status: DISCONTINUED | OUTPATIENT
Start: 2022-09-08 | End: 2022-09-08 | Stop reason: HOSPADM

## 2022-09-08 RX ORDER — ALBUTEROL SULFATE 2.5 MG/3ML
2.5 SOLUTION RESPIRATORY (INHALATION) ONCE AS NEEDED
Status: DISCONTINUED | OUTPATIENT
Start: 2022-09-08 | End: 2022-09-08 | Stop reason: HOSPADM

## 2022-09-08 RX ORDER — LIDOCAINE HYDROCHLORIDE 10 MG/ML
INJECTION, SOLUTION EPIDURAL; INFILTRATION; INTRACAUDAL; PERINEURAL AS NEEDED
Status: DISCONTINUED | OUTPATIENT
Start: 2022-09-08 | End: 2022-09-08

## 2022-09-08 RX ORDER — SODIUM CHLORIDE, SODIUM LACTATE, POTASSIUM CHLORIDE, CALCIUM CHLORIDE 600; 310; 30; 20 MG/100ML; MG/100ML; MG/100ML; MG/100ML
INJECTION, SOLUTION INTRAVENOUS CONTINUOUS PRN
Status: DISCONTINUED | OUTPATIENT
Start: 2022-09-08 | End: 2022-09-08

## 2022-09-08 RX ORDER — HYDROMORPHONE HCL/PF 1 MG/ML
0.5 SYRINGE (ML) INJECTION
Status: DISCONTINUED | OUTPATIENT
Start: 2022-09-08 | End: 2022-09-08 | Stop reason: HOSPADM

## 2022-09-08 RX ORDER — MIDAZOLAM HYDROCHLORIDE 2 MG/2ML
INJECTION, SOLUTION INTRAMUSCULAR; INTRAVENOUS AS NEEDED
Status: DISCONTINUED | OUTPATIENT
Start: 2022-09-08 | End: 2022-09-08

## 2022-09-08 RX ORDER — PHENAZOPYRIDINE HYDROCHLORIDE 100 MG/1
200 TABLET, FILM COATED ORAL ONCE
Status: COMPLETED | OUTPATIENT
Start: 2022-09-08 | End: 2022-09-08

## 2022-09-08 RX ORDER — FENTANYL CITRATE/PF 50 MCG/ML
25 SYRINGE (ML) INJECTION
Status: DISCONTINUED | OUTPATIENT
Start: 2022-09-08 | End: 2022-09-08 | Stop reason: HOSPADM

## 2022-09-08 RX ORDER — PROPOFOL 10 MG/ML
INJECTION, EMULSION INTRAVENOUS CONTINUOUS PRN
Status: DISCONTINUED | OUTPATIENT
Start: 2022-09-08 | End: 2022-09-08

## 2022-09-08 RX ORDER — FENTANYL CITRATE 50 UG/ML
INJECTION, SOLUTION INTRAMUSCULAR; INTRAVENOUS AS NEEDED
Status: DISCONTINUED | OUTPATIENT
Start: 2022-09-08 | End: 2022-09-08

## 2022-09-08 RX ORDER — ONDANSETRON 2 MG/ML
4 INJECTION INTRAMUSCULAR; INTRAVENOUS ONCE AS NEEDED
Status: DISCONTINUED | OUTPATIENT
Start: 2022-09-08 | End: 2022-09-08 | Stop reason: HOSPADM

## 2022-09-08 RX ORDER — EPHEDRINE SULFATE 50 MG/ML
INJECTION INTRAVENOUS AS NEEDED
Status: DISCONTINUED | OUTPATIENT
Start: 2022-09-08 | End: 2022-09-08

## 2022-09-08 RX ORDER — PHENAZOPYRIDINE HYDROCHLORIDE 200 MG/1
200 TABLET, FILM COATED ORAL 3 TIMES DAILY PRN
Qty: 30 TABLET | Refills: 0 | Status: SHIPPED | OUTPATIENT
Start: 2022-09-08

## 2022-09-08 RX ORDER — PROPOFOL 10 MG/ML
INJECTION, EMULSION INTRAVENOUS AS NEEDED
Status: DISCONTINUED | OUTPATIENT
Start: 2022-09-08 | End: 2022-09-08

## 2022-09-08 RX ORDER — DEXMEDETOMIDINE HYDROCHLORIDE 100 UG/ML
INJECTION, SOLUTION INTRAVENOUS AS NEEDED
Status: DISCONTINUED | OUTPATIENT
Start: 2022-09-08 | End: 2022-09-08

## 2022-09-08 RX ADMIN — FENTANYL CITRATE 25 MCG: 50 INJECTION, SOLUTION INTRAMUSCULAR; INTRAVENOUS at 07:46

## 2022-09-08 RX ADMIN — LEVOFLOXACIN: 5 INJECTION, SOLUTION INTRAVENOUS at 07:25

## 2022-09-08 RX ADMIN — MIDAZOLAM 2 MG: 1 INJECTION INTRAMUSCULAR; INTRAVENOUS at 07:25

## 2022-09-08 RX ADMIN — FENTANYL CITRATE 50 MCG: 50 INJECTION, SOLUTION INTRAMUSCULAR; INTRAVENOUS at 07:32

## 2022-09-08 RX ADMIN — PHENAZOPYRIDINE 200 MG: 100 TABLET ORAL at 08:59

## 2022-09-08 RX ADMIN — DEXMEDETOMIDINE HYDROCHLORIDE 8 MCG: 100 INJECTION, SOLUTION INTRAVENOUS at 07:50

## 2022-09-08 RX ADMIN — FENTANYL CITRATE 25 MCG: 50 INJECTION, SOLUTION INTRAMUSCULAR; INTRAVENOUS at 07:43

## 2022-09-08 RX ADMIN — EPHEDRINE SULFATE 10 MG: 50 INJECTION, SOLUTION INTRAVENOUS at 08:28

## 2022-09-08 RX ADMIN — DEXMEDETOMIDINE HYDROCHLORIDE 8 MCG: 100 INJECTION, SOLUTION INTRAVENOUS at 08:12

## 2022-09-08 RX ADMIN — PROPOFOL 50 MG: 10 INJECTION, EMULSION INTRAVENOUS at 07:34

## 2022-09-08 RX ADMIN — SODIUM CHLORIDE, SODIUM LACTATE, POTASSIUM CHLORIDE, AND CALCIUM CHLORIDE: .6; .31; .03; .02 INJECTION, SOLUTION INTRAVENOUS at 07:29

## 2022-09-08 RX ADMIN — DEXMEDETOMIDINE HYDROCHLORIDE 8 MCG: 100 INJECTION, SOLUTION INTRAVENOUS at 07:54

## 2022-09-08 RX ADMIN — DEXMEDETOMIDINE HYDROCHLORIDE 8 MCG: 100 INJECTION, SOLUTION INTRAVENOUS at 07:59

## 2022-09-08 RX ADMIN — LIDOCAINE HYDROCHLORIDE 50 MG: 10 INJECTION, SOLUTION EPIDURAL; INFILTRATION; INTRACAUDAL; PERINEURAL at 07:34

## 2022-09-08 RX ADMIN — PROPOFOL 120 MCG/KG/MIN: 10 INJECTION, EMULSION INTRAVENOUS at 07:35

## 2022-09-08 RX ADMIN — DEXMEDETOMIDINE HYDROCHLORIDE 8 MCG: 100 INJECTION, SOLUTION INTRAVENOUS at 08:06

## 2022-09-08 NOTE — INTERVAL H&P NOTE
H&P reviewed  After examining the patient I find no changes in the patients condition since the H&P had been written      Vitals:    09/08/22 0640   BP: (!) 176/86   Pulse: (!) 52   Resp: 16   Temp: (!) 97 2 °F (36 2 °C)   SpO2: 97%

## 2022-09-08 NOTE — ANESTHESIA POSTPROCEDURE EVALUATION
Post-Op Assessment Note    CV Status:  Stable  Pain Score: 0    Pain management: adequate  Multimodal analgesia used between 6 hours prior to anesthesia start to PACU discharge    Mental Status:  Awake and sleepy   Hydration Status:  Euvolemic   PONV Controlled:  Controlled   Airway Patency:  Patent   Two or more mitigation strategies used for obstructive sleep apnea   Post Op Vitals Reviewed: Yes      Staff: CRNA         No complications documented      BP  83/51   Temp  98   Pulse 46   Resp   18   SpO2   98

## 2022-09-08 NOTE — DISCHARGE INSTRUCTIONS
Expect to see blood in the urine, and have burning urgency and frequency of urination  Two small tumors were removed  Follow-up as scheduled  Call for fever greater than 101 5°, severe pain not relieved by over-the-counter pain medicines, or inability to urinate  Avoid heavy lifting for the next week  Resume all your normal medications  Take over-the-counter remedies to avoid constipation  I will let you know the pathology results are  We will need to repeat cystoscopy in the office in 3 months for surveillance

## 2022-09-08 NOTE — OP NOTE
OPERATIVE REPORT  PATIENT NAME: Rhonda Evans    :  1965  MRN: [id number]  Pt Location: [facility] OR ROOM 02    SURGERY DATE: 2022    Surgeon(s) and Role:     * Uzma Wilder MD - Primary    Preop Diagnosis:  Malignant neoplasm of urinary bladder, unspecified site [facility]) [C67 9] 2 lesions, a 1 cm tumor left posterior wall, a 1 cm tumor right dome    Post-Op Diagnosis Codes:     As above    Procedure(s) (LRB):  TRANSURETHRAL RESECTION OF BLADDER TUMOR (TURBT) and mitomycin installation (N/A)    Specimen(s):  ID Type Source Tests Collected by Time Destination   1 : LEFT POSTERIOR WALL TUMOR Tissue Urinary Bladder TISSUE EXAM Uzma Wilder MD 2022 [id number]    2 : RIGHT DOME-BLADDER Tissue Urinary Bladder TISSUE EXAM Uzma Wilder MD 2022 [id number]        Estimated Blood Loss:   Minimal    Drains:  Urethral Catheter Other (Comment) 16 Fr  (Active)   Number of days: 0       Anesthesia Type:   General/LMA    Operative Indications:  Malignant neoplasm of urinary bladder, unspecified site ([address] ) [C67 9]  As above    Operative Findings:  1 cm tumor, left posterior wall, and a 1 cm tumor right dome, completely resected with a cup forceps  Fulgurated with the Carone  Complications:   None    Procedure and Technique:  45-year-old man with history of bladder cancer found have recurrence as above  He was offered cystoscopy with TURBT with mitomycin  The risks bleeding infection damage urinary tract, perforation and need for additional procedures were explained he gives informed consent  Patient was brought to the operating room identified properly  He was placed under sedation and I used a 25 [place] cystoscopy sheath to inspect the urethra the bladder  The urethra was unremarkable  Prostate was nonocclusive  The bladder was smooth not trabeculated and there was a 1 cm tumor in the right dome and the left posterior wall    Using the large cup forceps, I grasped each tumor and sent it separately with forceps and point deep to get muscular  specimens  The sites were fulgurated at the base for hemostasis with the Bugbee electrode  After this I placed a 16 [place] Pedro catheter and placed 40 mg in 40 cc of mitomycin into the bladder  This was clamped and connected to a drainage bag     I was present for the entire procedure and A qualified resident physician was not available    Patient Disposition:  PACU  and hemodynamically stable      SIGNATURE: Olayinka Childs MD  DATE: September 8, 2022  TIME: 8:19 AM

## 2022-09-09 ENCOUNTER — TELEPHONE (OUTPATIENT)
Dept: UROLOGY | Facility: CLINIC | Age: 57
End: 2022-09-09

## 2022-09-09 NOTE — TELEPHONE ENCOUNTER
Post Op Note    Rey Richardson is a 64 y o  male s/p TRANSURETHRAL RESECTION OF BLADDER TUMOR (TURBT) and mitomycin installation (N/A Bladder) performed 9/8/22  Rey Richardson is a patient of Dr Mario Currie and is seen at the [facility] office  How would you rate your pain on a scale from 1 to 10, 10 being the worst pain ever? 0  Have you had a fever? No  Have your bowel movements been regular? Yes  Do you have any difficulty urinating? No  Do you have any other questions or concerns that I can address at this time? Patient states he is doing well s/p TURBT yesterday  Reports some burning with urination which is to be expected  Advised that Pyridium was called into his pharmacy and he can pick this up and begin taking medication  Patient verbalized understanding  Appointment with Ludivina Moreno on 9/22 cancelled as patient will be out of town  He is aware Dr Mario Currie will contact him with tissue exam results   Patient scheduled for 3 month cysto with Dr Mario Currie in [facility] on 12/23/22 @ 3:00 pm

## 2022-09-09 NOTE — TELEPHONE ENCOUNTER
----- Message from Marcos Nguyen MD sent at 9/8/2022  8:40 AM EDT -----  Patient says he will be out of town for the September 22nd appointment-please make sure he has an appointment with me in 3 months for cystoscopy and I will call him with results

## 2022-09-13 ENCOUNTER — NURSE TRIAGE (OUTPATIENT)
Dept: OTHER | Facility: OTHER | Age: 57
End: 2022-09-13

## 2022-09-13 NOTE — TELEPHONE ENCOUNTER
Patient called in to report blood in urine without clots post TURBT on 9/8/22  Reached out to on call provider who advised patient to expect blood in urine for 1-2 weeks post op  Patient is taking pyridium but could distinguish red blood in his urine today  Advised to increase PO fluids and follow up in office (days) or ED (nights and weekend)  if he has large clots or unable to pass his urine  Patient verbalized understanding  Reason for Disposition   [1] Caller has NON-URGENT question AND [2] triager unable to answer question    Answer Assessment - Initial Assessment Questions  1  SYMPTOM: "What's the main symptom you're concerned about?" (e g , pain, fever, vomiting)      Red blood in urine; no clots seen; hard to tell while taking pyridium    2  ONSET: "When did symptoms start?"      Since surgery off and on    3  SURGERY: "What surgery was performed?"      TURBT    4  DATE of SURGERY: "When was surgery performed?"      9/8/22    5  ANESTHESIA: " What type of anesthesia did you have?" (e g , general, spinal, epidural, local)      IV sedation    6  PAIN: "Is there any pain?" If Yes, ask: "How bad is it?"  (Scale 1-10; or mild, moderate, severe)     Pain post catheter discontinued post procedure    7  FEVER: "Do you have a fever?" If Yes, ask: "What is your temperature, how was it measured, and when did it start?"      Denies    8  VOMITING: "Is there any vomiting?" If yes, ask: "How many times?"      Denies    9  BLEEDING: "Is there any bleeding?" If Yes, ask: "How much?" and "Where?"     Only through urine    10   OTHER SYMPTOMS: "Do you have any other symptoms?" (e g , drainage from wound, painful urination, constipation)        Denies    Protocols used: POST-OP SYMPTOMS AND QUESTIONS-[facility]

## 2022-09-13 NOTE — TELEPHONE ENCOUNTER
Regarding: Blood in urine yet after procedure   ----- Message from Haylee Yoon RN sent at 9/13/2022  4:32 PM EDT -----  "I had a TURBT done last week and I am still experiencing blood in my urine   I am concerned "

## 2022-09-15 PROCEDURE — 88307 TISSUE EXAM BY PATHOLOGIST: CPT | Performed by: PATHOLOGY

## 2022-09-16 ENCOUNTER — TELEPHONE (OUTPATIENT)
Dept: UROLOGY | Facility: CLINIC | Age: 57
End: 2022-09-16

## 2022-09-16 NOTE — TELEPHONE ENCOUNTER
----- Message from Judith Copeland MD sent at 9/15/2022  4:16 PM EDT -----  Please let him know that I am happy with his biopsy/TURBT results-he does have recurrence of some bladder cancer, but is noninvasive and low-grade  We will see him in 3 months for cystoscopy as planned  No further therapy needed right now    He has   refused BCG in the past

## 2022-09-16 NOTE — TELEPHONE ENCOUNTER
Jose A Moore and reviewed tissue exam results s/p TURBT  Advised that Dr Joelle Olivares will see patient as scheduled for cystoscopy in December  Patient comfortable with plan

## 2022-09-30 ENCOUNTER — VBI (OUTPATIENT)
Dept: ADMINISTRATIVE | Facility: OTHER | Age: 57
End: 2022-09-30

## 2022-10-10 ENCOUNTER — NURSE TRIAGE (OUTPATIENT)
Dept: OTHER | Facility: OTHER | Age: 57
End: 2022-10-10

## 2022-10-10 DIAGNOSIS — R30.0 DYSURIA: Primary | ICD-10-CM

## 2022-10-10 NOTE — TELEPHONE ENCOUNTER
Regarding: Painful urination  ----- Message from Darvin Simms sent at 10/10/2022  1:10 PM EDT -----  "I had surgery about a month ago and had a catheter removed and it was painful  I was on bladder spasm meds to help with that and it did stop hurting  After I stopped them I ended up getting a cold and then I ended up having pain again  It hurts to urinate   I would like some advice "

## 2022-10-10 NOTE — TELEPHONE ENCOUNTER
Attempted to contact via mobile number   No answer, unable to leave a void message   Mail box was not set up  Attempted to contact patient via work number listed on media contact   Patient reports that he has cold and coughing a lot  Reports burning  Lower pelvic discomfort Can not report on fevers or chills has been ill with cough  Is taking mucinex and cough medicine  Patient is taking phenazopyridine started last night   Urine is reported as clear before taking pyridium, now orange in color  Patient has an appt with his pcp on Friday  Advised patient to hydrate with water and monitor for changes  Orders for urine testing placed  Patient is requesting for us to call back to his wife with recommendations, it is really hard to contact him during the work day   Simone Dasilva [phone]

## 2022-10-10 NOTE — TELEPHONE ENCOUNTER
Reason for Disposition  • Caller has already spoken with another triager or PCP (or office), and has further questions and triager able to answer questions      Protocols used: NO CONTACT OR DUPLICATE CONTACT CALL-ADULT-[place]

## 2022-10-10 NOTE — TELEPHONE ENCOUNTER
Patient called in earlier in regards to same symptoms  symptoms were addressed in seperate encounter by [facility] RN and Urology  Spoke to patient   confirmed he spoke with nurse and aware of lab orders  Asked if any restrictions prior to labs  Advised no    No other questions at this time

## 2022-10-10 NOTE — TELEPHONE ENCOUNTER
Regarding: pain when urinating  ----- Message from Lacie Tolbert sent at 10/10/2022 10:09 AM EDT -----  "I had surgery about a month ago and had a catheter removed and it was painful  I was on bladder spasm meds to help with that and it did stop hurting  After I stopped them I ended up getting a cold and then I ended up having pain again  It hurts to urinate   I would like some advice "

## 2022-10-10 NOTE — TELEPHONE ENCOUNTER
Reason for Disposition  • Second attempt to contact caller AND no contact made  Phone number verified      Protocols used: NO CONTACT OR DUPLICATE CONTACT CALL-ADULT-

## 2022-10-11 ENCOUNTER — NURSE TRIAGE (OUTPATIENT)
Dept: OTHER | Facility: OTHER | Age: 57
End: 2022-10-11

## 2022-10-11 ENCOUNTER — LAB (OUTPATIENT)
Dept: LAB | Facility: MEDICAL CENTER | Age: 57
End: 2022-10-11
Payer: COMMERCIAL

## 2022-10-11 DIAGNOSIS — R30.0 DYSURIA: ICD-10-CM

## 2022-10-11 LAB
AMORPH URATE CRY URNS QL MICRO: ABNORMAL
BACTERIA UR QL AUTO: ABNORMAL /HPF
BILIRUB UR QL STRIP: ABNORMAL
CLARITY UR: ABNORMAL
COLOR UR: ABNORMAL
GLUCOSE UR STRIP-MCNC: ABNORMAL MG/DL
HGB UR QL STRIP.AUTO: ABNORMAL
KETONES UR STRIP-MCNC: ABNORMAL MG/DL
LEUKOCYTE ESTERASE UR QL STRIP: ABNORMAL
NITRITE UR QL STRIP: ABNORMAL
NON-SQ EPI CELLS URNS QL MICRO: ABNORMAL /HPF
PH UR STRIP.AUTO: ABNORMAL [PH]
PROT UR STRIP-MCNC: ABNORMAL MG/DL
RBC #/AREA URNS AUTO: ABNORMAL /HPF
SP GR UR STRIP.AUTO: 1.02 (ref 1–1.03)
UROBILINOGEN UR QL STRIP.AUTO: ABNORMAL E.U./DL
WBC #/AREA URNS AUTO: ABNORMAL /HPF

## 2022-10-11 PROCEDURE — 87086 URINE CULTURE/COLONY COUNT: CPT

## 2022-10-11 PROCEDURE — 81001 URINALYSIS AUTO W/SCOPE: CPT

## 2022-10-11 RX ORDER — SULFAMETHOXAZOLE AND TRIMETHOPRIM 800; 160 MG/1; MG/1
1 TABLET ORAL EVERY 12 HOURS SCHEDULED
Qty: 10 TABLET | Refills: 0 | Status: SHIPPED | OUTPATIENT
Start: 2022-10-11 | End: 2022-10-16

## 2022-10-11 RX ORDER — SULFAMETHOXAZOLE AND TRIMETHOPRIM 800; 160 MG/1; MG/1
1 TABLET ORAL EVERY 12 HOURS SCHEDULED
Qty: 10 TABLET | Refills: 0 | Status: SHIPPED | OUTPATIENT
Start: 2022-10-11 | End: 2022-10-11 | Stop reason: SDUPTHER

## 2022-10-11 NOTE — TELEPHONE ENCOUNTER
Contacted patient who just left urine sample at the lab  Reports intermittent pain from his penis to where he chely his belt  Pain is worse when he urinates and he describes it as feeling as though he us urinating shards of glass  He has been taking leftover pyridium as needed and tylenol prn which have been ineffective  Patient requesting alternative medication to assist with the pain be sent to [facility] on file  Advised patient will monitor for urinalysis results and update him if antibiotic is warranted based on results

## 2022-10-11 NOTE — TELEPHONE ENCOUNTER
Regarding: abx dispensing issue/bleeding from penis  ----- Message from [facility] sent at 10/11/2022  7:30 PM EDT -----  Patient also noted that if he doesn't answer the first time please call his wife Jodie Romberg @ [phone]  ----- Message from [facility] sent at 10/11/2022  7:30 PM EDT -----  "I am calling because im having issues getting my abx, I am bleeding from my penis, I had a procedure with Dr Nancy Marroquin, they were going to send bactrim into my pharmacy in [place] but I get here which its a half hour away from my home and they know nothing of it(was sent as print), I dont know what to do and I dont want to go another night without taking the abx, im leaking a lot of blood and im asking to please have someone send this into the pharmacy or another pharmacy so I can pick it up tomorrow "

## 2022-10-11 NOTE — TELEPHONE ENCOUNTER
I will send a prescription for Bactrim to his pharmacy and we will adjust this based on his urine culture and sensitivity

## 2022-10-11 NOTE — TELEPHONE ENCOUNTER
Patient calls in states his prescription was not sent to the pharmacy  I see in epic it was sent but not received  Pt request it be sent to a different pharmacy  Prescription resent to [facility] in [place] at pts request  Encouraged patient to go for evaluation due to urinating straight blood and severe pain  Patient refused

## 2022-10-11 NOTE — TELEPHONE ENCOUNTER
Returned call to Kehinde Hernandez and made him aware Bactrim was called into [facility] for him to take twice a day for 5 days  Advised office will contact him with results of urine testing when finalized  He will hydrate well with water and continue prn medications

## 2022-10-12 LAB — BACTERIA UR CULT: NORMAL

## 2022-10-13 ENCOUNTER — TELEPHONE (OUTPATIENT)
Dept: UROLOGY | Facility: CLINIC | Age: 57
End: 2022-10-13

## 2022-10-13 NOTE — TELEPHONE ENCOUNTER
----- Message from Tiesha Cooley sent at 10/13/2022  7:36 AM EDT -----  Please let patient know his urine culture was negative   He can stop taking Bactrim

## 2022-10-13 NOTE — TELEPHONE ENCOUNTER
Called and spoke to pt he is negative for an infection after informing him of this the pt expressed that he is in great discomfort and is leaving blood  Also the pt has not slept in three days  I had advised that if this persists or has and persistent fever or chills the pt should go to the ER      Pt is requesting pain / sleeping medication

## 2022-10-13 NOTE — TELEPHONE ENCOUNTER
With that much discomfort, he needs to be evaluated  It would be best if he went to the emergency department

## 2022-10-13 NOTE — TELEPHONE ENCOUNTER
Called and informed pt of PB's recommendation to resort to the emergency room  do to heightened levels of pain that cant be managed with standard medication   Pt then stated he wasn't going to waist his time and hung up       [place]

## 2022-10-14 ENCOUNTER — OFFICE VISIT (OUTPATIENT)
Dept: INTERNAL MEDICINE CLINIC | Facility: CLINIC | Age: 57
End: 2022-10-14
Payer: COMMERCIAL

## 2022-10-14 VITALS
OXYGEN SATURATION: 98 % | BODY MASS INDEX: 28.42 KG/M2 | WEIGHT: 198.5 LBS | HEIGHT: 70 IN | DIASTOLIC BLOOD PRESSURE: 90 MMHG | HEART RATE: 71 BPM | TEMPERATURE: 97.8 F | SYSTOLIC BLOOD PRESSURE: 154 MMHG

## 2022-10-14 DIAGNOSIS — F12.10 MILD TETRAHYDROCANNABINOL (THC) ABUSE: ICD-10-CM

## 2022-10-14 DIAGNOSIS — J32.9 SINOBRONCHITIS: ICD-10-CM

## 2022-10-14 DIAGNOSIS — Z79.01 CHRONIC ANTICOAGULATION: ICD-10-CM

## 2022-10-14 DIAGNOSIS — J98.01 BRONCHOSPASM: ICD-10-CM

## 2022-10-14 DIAGNOSIS — C67.3 MALIGNANT NEOPLASM OF ANTERIOR WALL OF BLADDER (HCC): ICD-10-CM

## 2022-10-14 DIAGNOSIS — F17.200 SMOKING: ICD-10-CM

## 2022-10-14 DIAGNOSIS — Z00.00 WELL ADULT EXAM: Primary | ICD-10-CM

## 2022-10-14 DIAGNOSIS — Z23 ENCOUNTER FOR VACCINATION: ICD-10-CM

## 2022-10-14 DIAGNOSIS — Z13.220 SCREENING FOR LIPID DISORDERS: ICD-10-CM

## 2022-10-14 DIAGNOSIS — I10 PRIMARY HYPERTENSION: ICD-10-CM

## 2022-10-14 DIAGNOSIS — Z12.2 ENCOUNTER FOR SCREENING FOR LUNG CANCER: ICD-10-CM

## 2022-10-14 DIAGNOSIS — J40 SINOBRONCHITIS: ICD-10-CM

## 2022-10-14 DIAGNOSIS — Z86.718 HISTORY OF DVT (DEEP VEIN THROMBOSIS): ICD-10-CM

## 2022-10-14 PROCEDURE — 94640 AIRWAY INHALATION TREATMENT: CPT | Performed by: INTERNAL MEDICINE

## 2022-10-14 PROCEDURE — 99396 PREV VISIT EST AGE 40-64: CPT | Performed by: INTERNAL MEDICINE

## 2022-10-14 PROCEDURE — A7003 NEBULIZER ADMINISTRATION SET: HCPCS | Performed by: INTERNAL MEDICINE

## 2022-10-14 PROCEDURE — 96372 THER/PROPH/DIAG INJ SC/IM: CPT | Performed by: INTERNAL MEDICINE

## 2022-10-14 RX ORDER — FLUTICASONE PROPIONATE 50 MCG
1 SPRAY, SUSPENSION (ML) NASAL DAILY
Qty: 16 G | Refills: 0 | Status: SHIPPED | OUTPATIENT
Start: 2022-10-14

## 2022-10-14 RX ORDER — GUAIFENESIN AND CODEINE PHOSPHATE 100; 10 MG/5ML; MG/5ML
5 SOLUTION ORAL 3 TIMES DAILY PRN
Qty: 120 ML | Refills: 0 | Status: SHIPPED | OUTPATIENT
Start: 2022-10-14

## 2022-10-14 RX ORDER — ALBUTEROL SULFATE 90 UG/1
2 AEROSOL, METERED RESPIRATORY (INHALATION) EVERY 6 HOURS PRN
Qty: 18 G | Refills: 5 | Status: SHIPPED | OUTPATIENT
Start: 2022-10-14

## 2022-10-14 RX ORDER — LEVOFLOXACIN 500 MG/1
500 TABLET, FILM COATED ORAL EVERY 24 HOURS
Qty: 10 TABLET | Refills: 0 | Status: SHIPPED | OUTPATIENT
Start: 2022-10-14 | End: 2022-10-24

## 2022-10-14 RX ORDER — GUAIFENESIN 600 MG/1
600 TABLET, EXTENDED RELEASE ORAL EVERY 12 HOURS SCHEDULED
Qty: 20 TABLET | Refills: 0 | Status: SHIPPED | OUTPATIENT
Start: 2022-10-14

## 2022-10-14 RX ORDER — DEXAMETHASONE SODIUM PHOSPHATE 4 MG/ML
4 INJECTION, SOLUTION INTRA-ARTICULAR; INTRALESIONAL; INTRAMUSCULAR; INTRAVENOUS; SOFT TISSUE ONCE
Status: COMPLETED | OUTPATIENT
Start: 2022-10-14 | End: 2022-10-14

## 2022-10-14 RX ORDER — IPRATROPIUM BROMIDE AND ALBUTEROL SULFATE 2.5; .5 MG/3ML; MG/3ML
3 SOLUTION RESPIRATORY (INHALATION) ONCE
Status: COMPLETED | OUTPATIENT
Start: 2022-10-14 | End: 2022-10-14

## 2022-10-14 RX ADMIN — IPRATROPIUM BROMIDE AND ALBUTEROL SULFATE 3 ML: 2.5; .5 SOLUTION RESPIRATORY (INHALATION) at 10:13

## 2022-10-14 RX ADMIN — DEXAMETHASONE SODIUM PHOSPHATE 4 MG: 4 INJECTION, SOLUTION INTRA-ARTICULAR; INTRALESIONAL; INTRAMUSCULAR; INTRAVENOUS; SOFT TISSUE at 10:13

## 2022-10-14 NOTE — PROGRESS NOTES
Depression Screening and Follow-up Plan: Patient was screened for depression during today's encounter  They screened negative with a PHQ-2 score of 0      Assessment/Plan:  Problem List Items Addressed This Visit        Cardiovascular and Mediastinum    Hypertension    Relevant Orders    Lipid Panel with Direct LDL reflex    TSH, 3rd generation    Microalbumin / creatinine urine ratio       Genitourinary    Malignant neoplasm of anterior wall of bladder (HCC)       Other    Smoking    Relevant Orders    Mini neb    Mild tetrahydrocannabinol (THC) abuse    History of DVT (deep vein thrombosis)    Chronic anticoagulation      Other Visit Diagnoses     Well adult exam    -  Primary    Relevant Orders    Lipid Panel with Direct LDL reflex    TSH, 3rd generation    Screening for lipid disorders        Relevant Orders    LDL cholesterol, direct    Triglycerides    Lipid Panel with Direct LDL reflex    Encounter for vaccination        Sinobronchitis        Relevant Medications    levofloxacin (LEVAQUIN) 500 mg tablet    guaiFENesin (Mucinex) 600 mg 12 hr tablet    fluticasone (FLONASE) 50 mcg/act nasal spray    guaifenesin-codeine (GUAIFENESIN AC) 100-10 MG/5ML liquid    dexamethasone (DECADRON) injection 4 mg (Start on 10/14/2022 10:00 AM)    ipratropium-albuterol (DUO-NEB) 0 5-2 5 mg/3 mL inhalation solution 3 mL (Start on 10/14/2022 10:00 AM)    Other Relevant Orders    Mini neb    Encounter for screening for lung cancer        Bronchospasm        Relevant Medications    levofloxacin (LEVAQUIN) 500 mg tablet    guaiFENesin (Mucinex) 600 mg 12 hr tablet    fluticasone (FLONASE) 50 mcg/act nasal spray    guaifenesin-codeine (GUAIFENESIN AC) 100-10 MG/5ML liquid    dexamethasone (DECADRON) injection 4 mg (Start on 10/14/2022 10:00 AM)    ipratropium-albuterol (DUO-NEB) 0 5-2 5 mg/3 mL inhalation solution 3 mL (Start on 10/14/2022 10:00 AM)    Other Relevant Orders    Mini neb           Diagnoses and all orders for this visit:    Well adult exam  -     Lipid Panel with Direct LDL reflex; Future  -     TSH, 3rd generation; Future    Primary hypertension  -     Lipid Panel with Direct LDL reflex; Future  -     TSH, 3rd generation; Future  -     Microalbumin / creatinine urine ratio; Future    Malignant neoplasm of anterior wall of bladder (HCC)    Smoking  -     Mini neb    Mild tetrahydrocannabinol (THC) abuse    History of DVT (deep vein thrombosis)    Chronic anticoagulation    Screening for lipid disorders  -     LDL cholesterol, direct; Future  -     Triglycerides; Future  -     Lipid Panel with Direct LDL reflex; Future    Encounter for vaccination    Sinobronchitis  -     Mini neb  -     levofloxacin (LEVAQUIN) 500 mg tablet; Take 1 tablet (500 mg total) by mouth every 24 hours for 10 days  -     guaiFENesin (Mucinex) 600 mg 12 hr tablet; Take 1 tablet (600 mg total) by mouth every 12 (twelve) hours  -     fluticasone (FLONASE) 50 mcg/act nasal spray; 1 spray into each nostril daily  -     guaifenesin-codeine (GUAIFENESIN AC) 100-10 MG/5ML liquid; Take 5 mL by mouth 3 (three) times a day as needed for cough  -     dexamethasone (DECADRON) injection 4 mg  -     ipratropium-albuterol (DUO-NEB) 0 5-2 5 mg/3 mL inhalation solution 3 mL    Encounter for screening for lung cancer    Bronchospasm  -     Mini neb  -     levofloxacin (LEVAQUIN) 500 mg tablet; Take 1 tablet (500 mg total) by mouth every 24 hours for 10 days  -     guaiFENesin (Mucinex) 600 mg 12 hr tablet; Take 1 tablet (600 mg total) by mouth every 12 (twelve) hours  -     fluticasone (FLONASE) 50 mcg/act nasal spray; 1 spray into each nostril daily  -     guaifenesin-codeine (GUAIFENESIN AC) 100-10 MG/5ML liquid;  Take 5 mL by mouth 3 (three) times a day as needed for cough  -     dexamethasone (DECADRON) injection 4 mg  -     ipratropium-albuterol (DUO-NEB) 0 5-2 5 mg/3 mL inhalation solution 3 mL      No problem-specific Assessment & Plan notes found for this encounter  A/P: Doing well and co-morbidities are stable  Labs mostly up to date, but will get additional routine labs  Appreciate  input  Will treat URI since getting worse on bactrim  Will cover the urine and URI with levoquin, mucinex, steroids, INS, and cough meds  Hold CXR  Refuses covid test   Discussed vaccines defers  Wean tobacco  Discussed lung ca screening and defers  BP is up, but pt is acutely sick  Reports OP readings ok and will monitor for now    No mental or physical restrictions  No evidence of communicable diseases  Continue current treatment and RTC one year for annual and four months for routine  Pt is one year out from DVT and will d/c DOAC  Keep f/u with   Subjective:      Patient ID: Carl Douglas is a 64 y o  male  WM presents for a well exam  Doing ok ,but reports several days of URI s/s  No covid exposurs, no travel, and no else ill  NO fever or chills  Notes headaches, nasal and chest congestion with PND and cough  No SOB  S/P recent bladder sx for bladder cancer and some discomfort and working with  Currently on bactrim for bladder issues and feeling better, but URI s/s worsening  Pt to have repeat scope in several months    Recent stress test acceptable  Still smoking  No recent DVT like s/s  Remains on chronic anticoagulation and no bleeding events  Remains active w/o difficulty and no falls  Denies depression  No suicidal or violent ideations  Working full time  No recent travel  No fever, chills, or sweats  No unexplained wt change  Denies CP, SOB, palpitations, edema, orthopnea, or PND  No sz or syncope  No changes in bowel or bladder habits  No trouble swallowing  Appetite and sleep are good  Sees both a DDS and an eye doctor  No change in PMH, PSH, ALL, OH, SH, or Fhx  Currently due for labs, vaccines, and lung ca screening              The following portions of the patient's history were reviewed and updated as appropriate:   He has a past medical history of Bladder cancer ([facility] [address] [facility] ), [facility] ), DVT (deep venous thrombosis) ([facility] ), and Hypertension  ,  does not have any pertinent problems on file  ,   has a past surgical history that includes Transurethral resection of bladder tumor (N/A, 8/30/2021); CYSTOSCOPY (N/A, 8/30/2021); Ankle surgery; Tonsillectomy; pr cystourethroscopy,biopsy (N/A, 12/6/2021); and pr cystourethroscopy,fulgur <0 5 cm lesn (N/A, 9/8/2022)  ,  family history is not on file  ,   reports that he has been smoking  He has a 30 00 pack-year smoking history  He has never used smokeless tobacco  He reports current alcohol use of about 7 0 standard drinks of alcohol per week  He reports previous drug use  Drug: Marijuana  ,  is allergic to penicillins     Current Outpatient Medications   Medication Sig Dispense Refill   • Eliquis 5 MG TAKE 1 TABLET BY MOUTH TWICE A DAY 60 tablet 5   • fluticasone (FLONASE) 50 mcg/act nasal spray 1 spray into each nostril daily 16 g 0   • guaiFENesin (Mucinex) 600 mg 12 hr tablet Take 1 tablet (600 mg total) by mouth every 12 (twelve) hours 20 tablet 0   • guaifenesin-codeine (GUAIFENESIN AC) 100-10 MG/5ML liquid Take 5 mL by mouth 3 (three) times a day as needed for cough 120 mL 0   • levofloxacin (LEVAQUIN) 500 mg tablet Take 1 tablet (500 mg total) by mouth every 24 hours for 10 days 10 tablet 0   • metoprolol succinate (Toprol XL) 50 mg 24 hr tablet Take 1 tablet (50 mg total) by mouth daily 90 tablet 3   • phenazopyridine (PYRIDIUM) 200 mg tablet Take 1 tablet (200 mg total) by mouth 3 (three) times a day as needed for bladder spasms 30 tablet 0   • sulfamethoxazole-trimethoprim (BACTRIM DS) 800-160 mg per tablet Take 1 tablet by mouth every 12 (twelve) hours for 5 days 10 tablet 0     Current Facility-Administered Medications   Medication Dose Route Frequency Provider Last Rate Last Admin   • dexamethasone (DECADRON) injection 4 mg  4 mg Intramuscular Once Jerica Gentile DO       • ipratropium-albuterol (DUO-NEB) 0 5-2 5 mg/3 mL inhalation solution 3 mL  3 mL Nebulization Once Zhanna Santana,            Review of Systems   Constitutional: Positive for activity change  Negative for appetite change, chills, diaphoresis, fatigue and fever  HENT: Positive for congestion, postnasal drip, rhinorrhea and sinus pressure  Negative for ear discharge, ear pain, facial swelling, hearing loss, sinus pain and sore throat  Respiratory: Positive for cough, chest tightness, shortness of breath and wheezing  Cardiovascular: Negative for chest pain, palpitations and leg swelling  Gastrointestinal: Negative for abdominal pain, constipation, diarrhea, nausea and vomiting  Genitourinary: Negative for difficulty urinating, dysuria, frequency and hematuria  Musculoskeletal: Negative for arthralgias, gait problem and myalgias  Neurological: Positive for headaches  Negative for dizziness, seizures, syncope, weakness and light-headedness  Psychiatric/Behavioral: Negative for confusion, dysphoric mood and sleep disturbance  The patient is not nervous/anxious  PHQ-2/9 Depression Screening    Little interest or pleasure in doing things: 0 - not at all  Feeling down, depressed, or hopeless: 0 - not at all  PHQ-2 Score: 0  PHQ-2 Interpretation: Negative depression screen        Objective:  Vitals:    10/14/22 0916   BP: 154/90   Pulse: 71   Temp: 97 8 °F (36 6 °C)   SpO2: 98%   Weight: 90 kg (198 lb 8 oz)   Height: 5' 10" (1 778 m)     Body mass index is 28 48 kg/m²  Physical Exam  Vitals and nursing note reviewed  Constitutional:       General: He is not in acute distress  Appearance: Normal appearance  He is not ill-appearing  HENT:      Head: Normocephalic and atraumatic  Right Ear: Tympanic membrane, ear canal and external ear normal  There is no impacted cerumen  Left Ear: Tympanic membrane, ear canal and external ear normal  There is no impacted cerumen        Mouth/Throat:      Mouth: Mucous membranes are moist       Pharynx: Posterior oropharyngeal erythema present  No oropharyngeal exudate  Eyes:      Conjunctiva/sclera: Conjunctivae normal       Pupils: Pupils are equal, round, and reactive to light  Neck:      Vascular: No carotid bruit  Cardiovascular:      Rate and Rhythm: Normal rate  Heart sounds: Normal heart sounds  Pulmonary:      Effort: Pulmonary effort is normal  No respiratory distress  Breath sounds: Wheezing present  No rales  Comments: Coarse and decreased BS with wheezing  Abdominal:      General: Bowel sounds are normal  There is no distension  Palpations: Abdomen is soft  Musculoskeletal:      Cervical back: Neck supple  No tenderness  Right lower leg: No edema  Left lower leg: No edema  Lymphadenopathy:      Cervical: No cervical adenopathy  Neurological:      General: No focal deficit present  Mental Status: He is alert and oriented to person, place, and time  Mental status is at baseline  Psychiatric:         Mood and Affect: Mood normal          Thought Content: Thought content normal          Judgment: Judgment normal        Mini neb  Performed by: Rafael Adams DO  Authorized by: Rafael Adams DO   Universal Protocol:  Consent: Verbal consent obtained  Risks and benefits: risks, benefits and alternatives were discussed  Consent given by: patient  Patient understanding: patient states understanding of the procedure being performed  Required items: required blood products, implants, devices, and special equipment available      Number of treatments:  1  Treatment 1:   Pre-Procedure     Symptoms:  Wheezing, cough and shortness of breath    Lung Sounds:  See h and p  HR:  See VSS    Medication Administered:  Duoneb - Albuterol 2 5 mg/Atrovent 0 5 mg  Post-Procedure     Symptoms:  Wheezing, cough and shortness of breath    Lung sounds:  Increase BS and wheezing  NO Rhonchi  WOB wnl       Scheduled Medication Review:  Pt's scheduled medication use was reviewed by myself/staff via the [facility] website  Pt's use has been found to be appropriate w/o any concerns for misuse by the patient  Pt's current conditions require continued scheduled medication use at this time  Future review for continued appropriate medication use and misuse will continue

## 2022-10-14 NOTE — PATIENT INSTRUCTIONS
Cigarette Smoking and Your Health   AMBULATORY CARE:   Risks to your health if you smoke:  Nicotine and other chemicals found in tobacco and e-cigarettes can damage every cell in your body  Even if you are a light smoker, you have an increased risk for cancer, heart disease, and lung disease  If you are pregnant or have diabetes, smoking increases your risk for complications  Nicotine can affect an adolescent's developing brain  This can lead to trouble thinking, learning, or paying attention  Benefits to your health if you stop smoking: You decrease respiratory symptoms such as coughing, wheezing, and shortness of breath  You reduce your risk for cancers of the lung, mouth, throat, kidney, bladder, pancreas, stomach, and cervix  If you already have cancer, you increase the benefits of chemotherapy  You also reduce your risk for cancer returning or a second cancer from developing  You reduce your risk for heart disease, blood clots, heart attack, and stroke  You reduce your risk for lung infections, and diseases such as pneumonia, asthma, chronic bronchitis, and emphysema  Your circulation improves  More oxygen can be delivered to your body  If you have diabetes, you lower your risk for complications, such as kidney, artery, and eye diseases  You also lower your risk for nerve damage  Nerve damage can lead to amputations, poor vision, and blindness  You improve your body's ability to heal and to fight infections  An adolescent can help his or her brain and body develop in a healthy way  Talk to your adolescent about all the health risks of nicotine  If you can, start talking about nicotine when your child is younger than 12 years  This may make it easier for him or her not to start using nicotine as a teenager or adult  Explain to him or her that it is best never to start  It can be hard to try to quit later      Benefits to the health of others if you stop smoking:  Tobacco is harmful to nonsmokers who breathe in your secondhand smoke  The following are ways the health of others around you may improve when you stop smoking: You lower the risks for lung cancer and heart disease in nonsmoking adults  If you are pregnant, you lower the risk for miscarriage, early delivery, low birth weight, and stillbirth  You also lower your baby's risk for SIDS, obesity, developmental delay, and neurobehavioral problems, such as ADHD  If you have children, you lower their risk for ear infections, colds, pneumonia, bronchitis, and asthma  Follow up with your doctor as directed:  Write down your questions so you remember to ask them during your visits  For support and more information:   [employer]  [address] [facility]  [address]  Phone: [facility] [address]  Phone: 1- 900 - 731-6908  Web Address: Neto iraheta    Smokefree  gov  Phone: 0- 565 - 809-8211  Web Address: [email or web address]  [address] [place] [address] 2022 Information is for End User's use only and may not be sold, redistributed or otherwise used for commercial purposes  All illustrations and images included in CareNotes® are the copyrighted property of A D A M , Inc  or [address] [place] [address]  The above information is an  only  It is not intended as medical advice for individual conditions or treatments  Talk to your doctor, nurse or pharmacist before following any medical regimen to see if it is safe and effective for you

## 2022-11-07 ENCOUNTER — NURSE TRIAGE (OUTPATIENT)
Dept: OTHER | Facility: OTHER | Age: 57
End: 2022-11-07

## 2022-11-07 NOTE — TELEPHONE ENCOUNTER
Reason for Disposition  • Patient wants to be seen    Answer Assessment - Initial Assessment Questions  1  COLOR of URINE: "Describe the color of the urine "  (e g , tea-colored, pink, red, blood clots, bloody)      Red with clots   2  ONSET: "When did the bleeding start?"       Saturday   3  EPISODES: "How many times has there been blood in the urine?" or "How many times today?"      Every time since Saturday   4  PAIN with URINATION: "Is there any pain with passing your urine?" If Yes, ask: "How bad is the pain?"  (Scale 1-10; or mild, moderate, severe)     - MILD - complains slightly about urination hurting     - MODERATE - interferes with normal activities       - SEVERE - excruciating, unwilling or unable to urinate because of the pain       No pain   5  FEVER: "Do you have a fever?" If Yes, ask: "What is your temperature, how was it measured, and when did it start?"      No   6  ASSOCIATED SYMPTOMS: "Are you passing urine more frequently than usual?"      20 times per day  7  OTHER SYMPTOMS: "Do you have any other symptoms?" (e g , back/flank pain, abdominal pain, vomiting)      No   8  PREGNANCY: "Is there any chance you are pregnant?" "When was your last menstrual period?"      No     Patient states that he has been off eliquis since september    Patient recently had bladder surgery in September and he has been off eliquis since then  He has been urinating blood on/off since after that      Protocols used: URINE - BLOOD IN-ADULT-OH

## 2022-11-07 NOTE — TELEPHONE ENCOUNTER
Regarding: urinating blood  ----- Message from Flor Lopez sent at 11/7/2022  2:38 PM EST -----  "Since saturday night I have been urinating blood with clots   What should I do?"

## 2022-11-08 ENCOUNTER — HOSPITAL ENCOUNTER (EMERGENCY)
Facility: HOSPITAL | Age: 57
Discharge: HOME | End: 2022-11-08
Attending: EMERGENCY MEDICINE

## 2022-11-08 ENCOUNTER — APPOINTMENT (EMERGENCY)
Dept: CT IMAGING | Facility: HOSPITAL | Age: 57
End: 2022-11-08

## 2022-11-08 VITALS
OXYGEN SATURATION: 98 % | RESPIRATION RATE: 18 BRPM | TEMPERATURE: 97.8 F | DIASTOLIC BLOOD PRESSURE: 88 MMHG | SYSTOLIC BLOOD PRESSURE: 178 MMHG | HEART RATE: 68 BPM

## 2022-11-08 DIAGNOSIS — R31.9 HEMATURIA: Primary | ICD-10-CM

## 2022-11-08 LAB
ANION GAP SERPL CALCULATED.3IONS-SCNC: 6 MMOL/L (ref 4–13)
APTT PPP: 27 SECONDS (ref 23–37)
BACTERIA UR QL AUTO: ABNORMAL /HPF
BASOPHILS # BLD AUTO: 0.1 THOUSANDS/ÂΜL (ref 0–0.1)
BASOPHILS NFR BLD AUTO: 1 % (ref 0–1)
BILIRUB UR QL STRIP: NEGATIVE
BUN SERPL-MCNC: 16 MG/DL (ref 5–25)
CALCIUM SERPL-MCNC: 9.2 MG/DL (ref 8.4–10.2)
CHLORIDE SERPL-SCNC: 105 MMOL/L (ref 96–108)
CLARITY UR: ABNORMAL
CO2 SERPL-SCNC: 26 MMOL/L (ref 21–32)
COLOR UR: ABNORMAL
CREAT SERPL-MCNC: 0.99 MG/DL (ref 0.6–1.3)
EOSINOPHIL # BLD AUTO: 0.19 THOUSAND/ÂΜL (ref 0–0.61)
EOSINOPHIL NFR BLD AUTO: 2 % (ref 0–6)
ERYTHROCYTE [DISTWIDTH] IN BLOOD BY AUTOMATED COUNT: 13.6 % (ref 11.6–15.1)
GFR SERPL CREATININE-BSD FRML MDRD: 84 ML/MIN/1.73SQ M
GLUCOSE SERPL-MCNC: 99 MG/DL (ref 65–140)
GLUCOSE UR STRIP-MCNC: NEGATIVE MG/DL
HCT VFR BLD AUTO: 46.7 % (ref 36.5–49.3)
HGB BLD-MCNC: 16 G/DL (ref 12–17)
HGB UR QL STRIP.AUTO: ABNORMAL
IMM GRANULOCYTES # BLD AUTO: 0.07 THOUSAND/UL (ref 0–0.2)
IMM GRANULOCYTES NFR BLD AUTO: 1 % (ref 0–2)
INR PPP: 0.98 (ref 0.84–1.19)
KETONES UR STRIP-MCNC: NEGATIVE MG/DL
LEUKOCYTE ESTERASE UR QL STRIP: ABNORMAL
LYMPHOCYTES # BLD AUTO: 2.08 THOUSANDS/ÂΜL (ref 0.6–4.47)
LYMPHOCYTES NFR BLD AUTO: 25 % (ref 14–44)
MCH RBC QN AUTO: 31.4 PG (ref 26.8–34.3)
MCHC RBC AUTO-ENTMCNC: 34.3 G/DL (ref 31.4–37.4)
MCV RBC AUTO: 92 FL (ref 82–98)
MONOCYTES # BLD AUTO: 0.54 THOUSAND/ÂΜL (ref 0.17–1.22)
MONOCYTES NFR BLD AUTO: 6 % (ref 4–12)
NEUTROPHILS # BLD AUTO: 5.47 THOUSANDS/ÂΜL (ref 1.85–7.62)
NEUTS SEG NFR BLD AUTO: 65 % (ref 43–75)
NITRITE UR QL STRIP: POSITIVE
NON-SQ EPI CELLS URNS QL MICRO: ABNORMAL /HPF
NRBC BLD AUTO-RTO: 0 /100 WBCS
PH UR STRIP.AUTO: 6 [PH]
PLATELET # BLD AUTO: 237 THOUSANDS/UL (ref 149–390)
PMV BLD AUTO: 9 FL (ref 8.9–12.7)
POTASSIUM SERPL-SCNC: 4 MMOL/L (ref 3.5–5.3)
PROT UR STRIP-MCNC: ABNORMAL MG/DL
PROTHROMBIN TIME: 13 SECONDS (ref 11.6–14.5)
RBC # BLD AUTO: 5.09 MILLION/UL (ref 3.88–5.62)
RBC #/AREA URNS AUTO: ABNORMAL /HPF
SODIUM SERPL-SCNC: 137 MMOL/L (ref 135–147)
SP GR UR STRIP.AUTO: >=1.03 (ref 1–1.03)
UROBILINOGEN UR QL STRIP.AUTO: 1 E.U./DL
WBC # BLD AUTO: 8.45 THOUSAND/UL (ref 4.31–10.16)
WBC #/AREA URNS AUTO: ABNORMAL /HPF

## 2022-11-08 RX ORDER — ACETAMINOPHEN 325 MG/1
650 TABLET ORAL ONCE
Status: DISCONTINUED | OUTPATIENT
Start: 2022-11-08 | End: 2022-11-08 | Stop reason: HOSPADM

## 2022-11-08 RX ORDER — OXYCODONE HYDROCHLORIDE AND ACETAMINOPHEN 5; 325 MG/1; MG/1
1 TABLET ORAL ONCE
Status: DISCONTINUED | OUTPATIENT
Start: 2022-11-08 | End: 2022-11-08 | Stop reason: HOSPADM

## 2022-11-08 RX ADMIN — IOHEXOL 100 ML: 350 INJECTION, SOLUTION INTRAVENOUS at 12:46

## 2022-11-08 NOTE — ED NOTES
Patient transported to [facility] [address] [place] [address] RN  11/08/22 0085

## 2022-11-08 NOTE — DISCHARGE INSTRUCTIONS
Call Center for [facility] or Dr Merlin Murillo office as soon as possible  Return to the ER with any significant change or worsening of your symptoms such as fever or inability to void your urine  The following findings require follow up:  Radiographic finding   Finding: IMPRESSION:     1  Irregular masslike enhancing bladder wall thickening currently predominantly involving the anterior wall and dome and right anterolateral margin  Findings most suggestive of recurrent bladder cancer  No dependent filling defects are seen  2  Mild anterior perivesical soft tissue stranding    Extraserosal disease is not excluded   Follow up required: Urology to evaluate potentially life threatening causes of these findings such as cancer   Follow up should be done within 1 week(s)

## 2022-11-08 NOTE — ED PROVIDER NOTES
History  Chief Complaint   Patient presents with   • Blood in Urine     Pt presents c/o urinating blood and tiny clots since Sat  Pt had TURBT 22      77-year-old male history of DVT on Eliquis and bladder cancer 2 months status post transurethral resection of bladder tumor (TURBT) presents complaining of hematuria  Patient reports “feels like Im pissing razor blades" for the past 2 months since his surgery  He reports the sensation of incomplete voiding of his bladder and a sensation that he needs to always urinate  Reports going approximately 20 times per day  He states he always has some hematuria although since Saturday it is been bright red and he has starting to pass clots  He denies any fevers, chills, vomiting, abdominal pain, melena hematochezia or any other complaints or concerns at this time  Prior to Admission Medications   Prescriptions Last Dose Informant Patient Reported? Taking?    Eliquis 5 MG   No No   Sig: TAKE 1 TABLET BY MOUTH TWICE A DAY   albuterol (Ventolin HFA) 90 mcg/act inhaler   No No   Sig: Inhale 2 puffs every 6 (six) hours as needed for wheezing   fluticasone (FLONASE) 50 mcg/act nasal spray   No No   Si spray into each nostril daily   guaiFENesin (Mucinex) 600 mg 12 hr tablet   No No   Sig: Take 1 tablet (600 mg total) by mouth every 12 (twelve) hours   guaifenesin-codeine (GUAIFENESIN AC) 100-10 MG/5ML liquid   No No   Sig: Take 5 mL by mouth 3 (three) times a day as needed for cough   metoprolol succinate (Toprol XL) 50 mg 24 hr tablet  Self No No   Sig: Take 1 tablet (50 mg total) by mouth daily   phenazopyridine (PYRIDIUM) 200 mg tablet   No No   Sig: Take 1 tablet (200 mg total) by mouth 3 (three) times a day as needed for bladder spasms      Facility-Administered Medications: None       Past Medical History:   Diagnosis Date   • Bladder cancer (HCC)    • Cancer (HCC)    • DVT (deep venous thrombosis) (HCC)    • Hypertension        Past Surgical History: Procedure Laterality Date   • ANKLE SURGERY     • CYSTOSCOPY N/A 8/30/2021    Procedure: CYSTOSCOPY;  Surgeon: Viona Peabody, MD;  [facility];  Service: Urology   • MO CYSTOURETHROSCOPY,BIOPSY N/A 12/6/2021    Procedure: CYSTOSCOPY, TURBT right lateral wall;  Surgeon: Viona Peabody, MD;  [facility];  Service: Urology   • MO CYSTOURETHROSCOPY,FULGUR <0 5 CM LESN N/A 9/8/2022    Procedure: TRANSURETHRAL RESECTION OF BLADDER TUMOR (TURBT) and mitomycin installation;  Surgeon: Viona Peabody, MD;  [facility];  Service: Urology   • TONSILLECTOMY     • TRANSURETHRAL RESECTION OF BLADDER TUMOR N/A 8/30/2021    Procedure: TRANSURETHRAL RESECTION OF BLADDER TUMOR (TURBT); Surgeon: Viona Peabody, MD;  [facility];  Service: Urology       History reviewed  No pertinent family history  I have reviewed and agree with the history as documented  E-Cigarette/Vaping   • E-Cigarette Use Never User      E-Cigarette/Vaping Substances   • Nicotine No    • THC No    • CBD No    • Flavoring No    • Other No    • Unknown No      Social History     Tobacco Use   • Smoking status: Current Every Day Smoker     Packs/day: 1 50     Years: 20 00     Pack years: 30 00   • Smokeless tobacco: Never Used   Vaping Use   • Vaping Use: Never used   Substance Use Topics   • Alcohol use: Yes     Alcohol/week: 7 0 standard drinks     Types: 7 Cans of beer per week     Comment: average 1 beer day   • Drug use: Not Currently     Types: Marijuana       Review of Systems   Constitutional: Negative for chills, fatigue and fever  HENT: Negative for congestion and sore throat  Eyes: Negative for pain  Respiratory: Negative for cough, chest tightness, shortness of breath and wheezing  Cardiovascular: Negative for chest pain, palpitations and leg swelling  Gastrointestinal: Negative for abdominal pain, constipation, diarrhea, nausea and vomiting  Endocrine: Negative for polyuria     Genitourinary: Positive for dysuria and hematuria  Musculoskeletal: Negative for arthralgias, back pain, myalgias and neck pain  Skin: Negative for rash  Neurological: Negative for dizziness, syncope, light-headedness and headaches  All other systems reviewed and are negative  Physical Exam  Physical Exam  Vitals reviewed  Constitutional:       General: He is not in acute distress  Appearance: Normal appearance  He is well-developed  He is not toxic-appearing  HENT:      Head: Normocephalic and atraumatic  Mouth/Throat:      Mouth: Mucous membranes are moist    Eyes:      Conjunctiva/sclera: Conjunctivae normal    Cardiovascular:      Rate and Rhythm: Normal rate and regular rhythm  Heart sounds: Normal heart sounds  Pulmonary:      Effort: Pulmonary effort is normal       Breath sounds: Normal breath sounds  Abdominal:      General: Bowel sounds are normal       Palpations: Abdomen is soft  Tenderness: There is no abdominal tenderness  Genitourinary:     Penis: Normal     Musculoskeletal:         General: Normal range of motion  Cervical back: Normal range of motion  Skin:     General: Skin is warm and dry  Capillary Refill: Capillary refill takes less than 2 seconds  Neurological:      General: No focal deficit present  Mental Status: He is alert and oriented to person, place, and time     Psychiatric:         Mood and Affect: Mood normal          Behavior: Behavior normal          Vital Signs  ED Triage Vitals [11/08/22 1048]   Temperature Pulse Respirations Blood Pressure SpO2   97 8 °F (36 6 °C) 68 18 (!) 178/88 98 %      Temp Source Heart Rate Source Patient Position - Orthostatic VS BP Location FiO2 (%)   Tympanic Monitor Sitting Left arm --      Pain Score       --           Vitals:    11/08/22 1048   BP: (!) 178/88   Pulse: 68   Patient Position - Orthostatic VS: Sitting         Visual Acuity      ED Medications  Medications   oxyCODONE-acetaminophen (PERCOCET) 5-325 mg per tablet 1 tablet (1 tablet Oral Not Given 11/8/22 1145)   acetaminophen (TYLENOL) tablet 650 mg (650 mg Oral Not Given 11/8/22 1146)   iohexol (OMNIPAQUE) 350 MG/ML injection (SINGLE-DOSE) 100 mL (100 mL Intravenous Given 11/8/22 1246)       Diagnostic Studies  Results Reviewed     Procedure Component Value Units Date/Time    Urine Microscopic [[id number]]  (Abnormal) Collected: 11/08/22 1138    Lab Status: Final result Specimen: Urine, Clean Catch Updated: 11/08/22 1227     RBC, UA       Field obscured, unable to enumerate     /hpf     WBC, UA       Field obscured, unable to enumerate     /hpf     Epithelial Cells       Field obscured, unable to enumerate     /hpf     Bacteria, UA       Field obscured, unable to enumerate     /hpf    Urine culture [[id number]] Collected: 11/08/22 1138    Lab Status:  In process Specimen: Urine, Clean Catch Updated: 11/08/22 1227    UA w Reflex to Microscopic w Reflex to Culture [[id number]]  (Abnormal) Collected: 11/08/22 1138    Lab Status: Final result Specimen: Urine, Clean Catch Updated: 11/08/22 1218     Color, UA Brown     Clarity, UA Turbid     Specific Gravity, UA >=1 030     pH, UA 6 0     Leukocytes, UA Trace     Nitrite, UA Positive     Protein, UA 3+ mg/dl      Glucose, UA Negative mg/dl      Ketones, UA Negative mg/dl      Urobilinogen, UA 1 0 E U /dl      Bilirubin, UA Negative     Occult Blood, UA 3+    Basic metabolic panel [[id number]] Collected: 11/08/22 1132    Lab Status: Final result Specimen: Blood from Arm, Left Updated: 11/08/22 1210     Sodium 137 mmol/L      Potassium 4 0 mmol/L      Chloride 105 mmol/L      CO2 26 mmol/L      ANION GAP 6 mmol/L      BUN 16 mg/dL      Creatinine 0 99 mg/dL      Glucose 99 mg/dL      Calcium 9 2 mg/dL      eGFR 84 ml/min/1 73sq m     Narrative:      [place] guidelines for Chronic Kidney Disease (CKD):   •  Stage 1 with normal or high GFR (GFR > 90 mL/min/1 73 square meters)  •  Stage 2 Mild CKD (GFR = 60-89 mL/min/1 73 square meters)  •  Stage 3A Moderate CKD (GFR = 45-59 mL/min/1 73 square meters)  •  Stage 3B Moderate CKD (GFR = 30-44 mL/min/1 73 square meters)  •  Stage 4 Severe CKD (GFR = 15-29 mL/min/1 73 square meters)  •  Stage 5 End Stage CKD (GFR <15 mL/min/1 73 square meters)  Note: GFR calculation is accurate only with a steady state creatinine    Protime-INR [[id number]]  (Normal) Collected: 11/08/22 1132    Lab Status: Final result Specimen: Blood from Arm, Left Updated: 11/08/22 1208     Protime 13 0 seconds      INR 0 98    APTT [[id number]]  (Normal) Collected: 11/08/22 1132    Lab Status: Final result Specimen: Blood from Arm, Left Updated: 11/08/22 1208     PTT 27 seconds     CBC and differential [[id number]] Collected: 11/08/22 1132    Lab Status: Final result Specimen: Blood from Arm, Left Updated: 11/08/22 1137     WBC 8 45 Thousand/uL      RBC 5 09 Million/uL      Hemoglobin 16 0 g/dL      Hematocrit 46 7 %      MCV 92 fL      MCH 31 4 pg      MCHC 34 3 g/dL      RDW 13 6 %      MPV 9 0 fL      Platelets 295 Thousands/uL      nRBC 0 /100 WBCs      Neutrophils Relative 65 %      Immat GRANS % 1 %      Lymphocytes Relative 25 %      Monocytes Relative 6 %      Eosinophils Relative 2 %      Basophils Relative 1 %      Neutrophils Absolute 5 47 Thousands/µL      Immature Grans Absolute 0 07 Thousand/uL      Lymphocytes Absolute 2 08 Thousands/µL      Monocytes Absolute 0 54 Thousand/µL      Eosinophils Absolute 0 19 Thousand/µL      Basophils Absolute 0 10 Thousands/µL                  CT abdomen pelvis with contrast   Final Result by Nadja Grant MD (11/08 1328)      1  Irregular masslike enhancing bladder wall thickening currently predominantly involving the anterior wall and dome and right anterolateral margin  Findings most suggestive of recurrent bladder cancer  No dependent filling defects are seen  2  Mild anterior perivesical soft tissue stranding    Extraserosal disease is not excluded The examination demonstrates a significant  finding and was documented as such in [facility] for liaison and referring practitioner immediate notification  Workstation performed: [id number]                    Procedures  Procedures         ED Course  ED Course as of 11/08/22 1647   Tue Nov 08, 2022   1212 Discussed with Cheryl Calhoun and Dr Brianna Cota of Urology  Recommends PVR and CT                               SBIRT 22yo+    Flowsheet Row Most Recent Value   SBIRT (23 yo +)    In order to provide better care to our patients, we are screening all of our patients for alcohol and drug use  Would it be okay to ask you these screening questions? Yes Filed at: 11/08/2022 1208   Initial Alcohol Screen: US AUDIT-C     1  How often do you have a drink containing alcohol? 0 Filed at: 11/08/2022 1208   2  How many drinks containing alcohol do you have on a typical day you are drinking? 0 Filed at: 11/08/2022 1208   3a  Male UNDER 65: How often do you have five or more drinks on one occasion? 0 Filed at: 11/08/2022 1208   Audit-C Score 0 Filed at: 11/08/2022 1208   HOANG: How many times in the past year have you    Used an illegal drug or used a prescription medication for non-medical reasons? Never Filed at: 11/08/2022 1208                    MDM  Number of Diagnoses or Management Options  Hematuria  Diagnosis management comments: Patient presented with dysuria x2 months that has been improving since the surgery  Patient's concern for arriving to the ED today was hematuria for the past few days now passing small clots  He urinates approximately 20 times per day but is able to void approximately 100 mL per avoid  Postvoid residual of 276  Discussed with Dr Fernando Anne of Urology that recommends against placing Pedro at this time  He states that as long as patient is able to void his bladder and his hemoglobin is stable and he may be discharged    Offered patient admission however he politely declined stating he wants to go home  Urinalysis noted  Suspect nitrites are due to gross blood  No fever or leukocytosis  Doubt acute infection  Will wait on culture prior to considering antibiotic treatment  CT report noted  Forward this result to Salvador Wise NP of patients Urology team to notify them of finding  Discussed the CT findings with the patient and informed him that the findings are consistent with potentially life threatening conditions such as cancer  He is fully aware that he needs to call his urologist's office immediately and schedule prompt follow-up  Gave very strict and specific verbal and written follow up instructions  The patientunderstand the importance of followup and the devastating medical, social and psychological implications that failure to followup could cause  Patient understands that followup is now in their hands and it is their responsibility to followup  They were able to repeat the instructions back to me  Portions of the record may have been created with voice recognition software  Occasional wrong word or "sound a like" substitutions may have occurred due to the inherent limitations of voice recognition software  Read the chart carefully and recognize, using context, where substitutions have occurred  Amount and/or Complexity of Data Reviewed  Discuss the patient with other providers: (Dr Giselle Sethi, Dr Natali Valadez, Dr Gerhardt Carbon, Earlene Bella)        Disposition  Final diagnoses:   Hematuria     Time reflects when diagnosis was documented in both MDM as applicable and the Disposition within this note     Time User Action Codes Description Comment    11/8/2022  2:44 PM Evin Mcadams Add [R31 9] Hematuria       ED Disposition     ED Disposition   Discharge    Condition   Stable    Date/Time   Tue Nov 8, 2022  2:44 PM    Comment   Simón Gutierrez discharge to home/self care                 Follow-up Information     Follow up With Specialties Details Why Pat Alonso MD Urology Schedule an appointment as soon as possible for a visit   [address] [place] [id number]  [phone]            Discharge Medication List as of 11/8/2022  2:53 PM      CONTINUE these medications which have NOT CHANGED    Details   albuterol (Ventolin HFA) 90 mcg/act inhaler Inhale 2 puffs every 6 (six) hours as needed for wheezing, Starting Fri 10/14/2022, Normal      Eliquis 5 MG TAKE 1 TABLET BY MOUTH TWICE A DAY, Normal      fluticasone (FLONASE) 50 mcg/act nasal spray 1 spray into each nostril daily, Starting Fri 10/14/2022, Normal      guaiFENesin (Mucinex) 600 mg 12 hr tablet Take 1 tablet (600 mg total) by mouth every 12 (twelve) hours, Starting Fri 10/14/2022, Normal      guaifenesin-codeine (GUAIFENESIN AC) 100-10 MG/5ML liquid Take 5 mL by mouth 3 (three) times a day as needed for cough, Starting Fri 10/14/2022, Normal      metoprolol succinate (Toprol XL) 50 mg 24 hr tablet Take 1 tablet (50 mg total) by mouth daily, Starting u 11/4/2021, Normal      phenazopyridine (PYRIDIUM) 200 mg tablet Take 1 tablet (200 mg total) by mouth 3 (three) times a day as needed for bladder spasms, Starting Thu 9/8/2022, Normal             No discharge procedures on file      PDMP Review       Value Time User    PDMP Reviewed  Yes 10/14/2022  9:38 AM Joy Choi DO          ED Provider  Electronically Signed by           Yeyo Pierre PA-C  11/08/22 6670

## 2022-11-10 DIAGNOSIS — I10 PRIMARY HYPERTENSION: ICD-10-CM

## 2022-11-10 LAB — BACTERIA UR CULT: NORMAL

## 2022-11-10 RX ORDER — METOPROLOL SUCCINATE 50 MG/1
TABLET, EXTENDED RELEASE ORAL
Qty: 30 TABLET | Refills: 11 | Status: SHIPPED | OUTPATIENT
Start: 2022-11-10

## 2022-11-14 ENCOUNTER — TELEPHONE (OUTPATIENT)
Dept: UROLOGY | Facility: MEDICAL CENTER | Age: 57
End: 2022-11-14

## 2022-11-14 NOTE — TELEPHONE ENCOUNTER
Patient requesting a call in follow up to being seen in the ER 11/8/22  Please call [phone] as he has several questions

## 2022-11-15 ENCOUNTER — TELEMEDICINE (OUTPATIENT)
Dept: UROLOGY | Facility: CLINIC | Age: 57
End: 2022-11-15

## 2022-11-15 DIAGNOSIS — R31.0 GROSS HEMATURIA: ICD-10-CM

## 2022-11-15 DIAGNOSIS — C67.3 MALIGNANT NEOPLASM OF ANTERIOR WALL OF URINARY BLADDER (HCC): Primary | ICD-10-CM

## 2022-11-15 RX ORDER — LEVOFLOXACIN 5 MG/ML
750 INJECTION, SOLUTION INTRAVENOUS ONCE
OUTPATIENT
Start: 2022-11-15 | End: 2022-11-15

## 2022-11-15 NOTE — PROGRESS NOTES
Virtual Brief Visit    Patient is located in the following state in which I hold an active license [place]      Assessment/Plan: he called c/o of severe dysuria , so this visit was set up- it was basically for the 1st 5-6 weeks severe dysuria  He had Mitomycin instilled post op, so will have to avoid intravesical therapy in the future  S/P TURBT with Erik 9/9/2022  He's doing better now, but had gross hematuria recently and seen in the ED  He is now having gross hematuria every time he urinates x 10 days, with clots  Urine cx was negative  Nocturia 1-1 5 hrs  I reviewed his CT scan personally  Kidneys look good, but there is a concerning growth in the anterior wall the bladder  This is either inflammation or recurrence of the cancer  I am skeptical out recurrence of the cancer because 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 that I removed last time, but given that he is bleeding he needs cystoscopy, fulguration and biopsy and possible TURBT if I see another tumor  He has in agreement with this and we set up for November 28th at [address]  No mitomycin  Problem List Items Addressed This Visit    None         Recent Visits  No visits were found meeting these conditions  Showing recent visits within past 7 days and meeting all other requirements  Today's Visits  Date Type Provider Dept   11/15/22 Telemedicine Sherryle Alice, MD [facility] [address] today's visits and meeting all other requirements  Future Appointments  No visits were found meeting these conditions    Showing future appointments within next 150 days and meeting all other requirements         Visit Time    Visit Start Time: 2:25PM  Visit Stop Time: 2:40  Total Visit Duration: 15

## 2022-11-15 NOTE — TELEPHONE ENCOUNTER
Called and spoke with patient to schedule appointment with Dr Marylen Herbert  Patient works in [employer] and does not want to drive to [place] for appointment  He is requesting virtual visit  Spoke with Dr Marylen Herbert and scheduled patient for virtual visit today at 2:00 pm  Patient at work and requests we try to contact him on mobile number first  If not able to be reached, please contact wife's phone number or work number in chart, as him and his wife work at the same place and his wife answers the phones

## 2022-11-15 NOTE — PROGRESS NOTES
Virtual Regular Visit    Verification of patient location:    Patient is located in the following state in which I hold an active license { amb virtual patient location:[address]}      Assessment/Plan:    Problem List Items Addressed This Visit    None              Reason for visit is   Chief Complaint   Patient presents with   • Virtual Regular Visit        Encounter provider Judith Copeland MD    Provider located at [address] [place] [address]  [phone]      Recent Visits  No visits were found meeting these conditions  Showing recent visits within past 7 days and meeting all other requirements  Future Appointments  No visits were found meeting these conditions  Showing future appointments within next 150 days and meeting all other requirements       The patient was identified by name and date of birth  Fei Arana was informed that this is a telemedicine visit and that the visit is being conducted through {AMB VIRTUAL VISIT OMQIKQ:10428}  {Telemedicine confidentiality :30712} {Telemedicine participants:57427}  He acknowledged consent and understanding of privacy and security of the video platform  The patient has agreed to participate and understands they can discontinue the visit at any time  Patient is aware this is a billable service  Subjective  Fei Arana is a 64 y o  male ***         HPI     Past Medical History:   Diagnosis Date   • Bladder cancer ([address] )    • Cancer ([address] )    • DVT (deep venous thrombosis) ([facility] [address] )    • Hypertension        Past Surgical History:   Procedure Laterality Date   • ANKLE SURGERY     • CYSTOSCOPY N/A 8/30/2021    Procedure: CYSTOSCOPY;  Surgeon: Judith Copeland MD;  Location:  MAIN OR;  Service: Urology   • LA CYSTOURETHROSCOPY,BIOPSY N/A 12/6/2021    Procedure: CYSTOSCOPY, TURBT right lateral wall;  Surgeon: Judith Copeland MD;  [facility];  Service: Urology   • LA CYSTOURETHROSCOPY,FULGUR <0 5 CM LESN N/A 9/8/2022    Procedure: TRANSURETHRAL RESECTION OF BLADDER TUMOR (TURBT) and mitomycin installation;  Surgeon: Karen Call MD;  Location: [facility];  Service: Urology   • TONSILLECTOMY     • TRANSURETHRAL RESECTION OF BLADDER TUMOR N/A 8/30/2021    Procedure: TRANSURETHRAL RESECTION OF BLADDER TUMOR (TURBT); Surgeon: Karen Call MD;  Location:  MAIN OR;  Service: Urology       Current Outpatient Medications   Medication Sig Dispense Refill   • albuterol (Ventolin HFA) 90 mcg/act inhaler Inhale 2 puffs every 6 (six) hours as needed for wheezing 18 g 5   • Eliquis 5 MG TAKE 1 TABLET BY MOUTH TWICE A DAY 60 tablet 5   • fluticasone (FLONASE) 50 mcg/act nasal spray 1 spray into each nostril daily 16 g 0   • guaiFENesin (Mucinex) 600 mg 12 hr tablet Take 1 tablet (600 mg total) by mouth every 12 (twelve) hours 20 tablet 0   • guaifenesin-codeine (GUAIFENESIN AC) 100-10 MG/5ML liquid Take 5 mL by mouth 3 (three) times a day as needed for cough 120 mL 0   • metoprolol succinate (TOPROL-XL) 50 mg 24 hr tablet TAKE 1 TABLET BY MOUTH EVERY DAY 30 tablet 11   • phenazopyridine (PYRIDIUM) 200 mg tablet Take 1 tablet (200 mg total) by mouth 3 (three) times a day as needed for bladder spasms 30 tablet 0     No current facility-administered medications for this visit  Allergies   Allergen Reactions   • Penicillins Other (See Comments)     As a child, unsure reaction       Review of Systems    Video Exam    There were no vitals filed for this visit      Physical Exam     {Time Spent:22513}

## 2022-11-17 ENCOUNTER — TELEPHONE (OUTPATIENT)
Dept: UROLOGY | Facility: AMBULATORY SURGERY CENTER | Age: 57
End: 2022-11-17

## 2022-11-17 ENCOUNTER — TELEPHONE (OUTPATIENT)
Dept: UROLOGY | Facility: MEDICAL CENTER | Age: 57
End: 2022-11-17

## 2022-11-17 ENCOUNTER — PREP FOR PROCEDURE (OUTPATIENT)
Dept: UROLOGY | Facility: AMBULATORY SURGERY CENTER | Age: 57
End: 2022-11-17

## 2022-11-17 DIAGNOSIS — C67.3 MALIGNANT NEOPLASM OF ANTERIOR WALL OF URINARY BLADDER (HCC): Primary | ICD-10-CM

## 2022-11-17 DIAGNOSIS — R39.89 SUSPECTED UTI: ICD-10-CM

## 2022-11-17 NOTE — TELEPHONE ENCOUNTER
----- Message from Tasha Young MD sent at 11/15/2022  3:41 PM EST -----  How about December 5th  ----- Message -----  From: Jacob De Paz  Sent: 11/15/2022   3:25 PM EST  To: Tasha Young MD    Cant do 11/28  Gerhardt Sep  apparently you only had the AM and now have office hours in the PM  no time left that day    ----- Message -----  From: Tasha Young MD  Sent: 11/15/2022   2:39 PM EST  To: Jacob De Paz    Please call pt to arrange TURBT at [facility] 11/28- I called him and placed case request

## 2022-11-17 NOTE — TELEPHONE ENCOUNTER
I called pt to discuss scheduling his procedure with Dr El Foster at the [facility] on 12/5/2022  There was no answer so I did leave a voicemail asking pt to call our office back to schedule  I then called pt 's wife to discuss scheduling his procedure and I was able to speak with her  I confirmed that 12/5/22 will work for pt  And informed her that pt will need to have a pre op urine culture next Monday at a [facility] lab  She is aware that pt will need to have a  and will stop any Asprin and/or vitamins 1 week prior to surgery  Per her request I will mail pt a copy of his surgical packet and instructed them to call our office with any questions or concerns regarding this procedure

## 2022-11-28 ENCOUNTER — TELEPHONE (OUTPATIENT)
Dept: OTHER | Facility: OTHER | Age: 57
End: 2022-11-28

## 2022-11-28 NOTE — TELEPHONE ENCOUNTER
Called and spoke with patient and advised that plan is to proceed with TURBT as scheduled per Mukesh Daley  Patient verbalized understanding

## 2022-11-28 NOTE — TELEPHONE ENCOUNTER
Patient stated that his symptoms have gotten better and that the bleeding when urinating has stopped  He would like to know if he should cancel procedure on 12/5/2022

## 2022-11-29 ENCOUNTER — APPOINTMENT (OUTPATIENT)
Dept: LAB | Facility: MEDICAL CENTER | Age: 57
End: 2022-11-29
Attending: UROLOGY

## 2022-11-29 DIAGNOSIS — C67.3 MALIGNANT NEOPLASM OF ANTERIOR WALL OF URINARY BLADDER (HCC): ICD-10-CM

## 2022-11-29 DIAGNOSIS — R39.89 SUSPECTED UTI: ICD-10-CM

## 2022-11-29 LAB
BACTERIA UR QL AUTO: ABNORMAL /HPF
BILIRUB UR QL STRIP: NEGATIVE
CLARITY UR: CLEAR
COLOR UR: YELLOW
GLUCOSE UR STRIP-MCNC: NEGATIVE MG/DL
HGB UR QL STRIP.AUTO: ABNORMAL
KETONES UR STRIP-MCNC: NEGATIVE MG/DL
LEUKOCYTE ESTERASE UR QL STRIP: ABNORMAL
MUCOUS THREADS UR QL AUTO: ABNORMAL
NITRITE UR QL STRIP: NEGATIVE
NON-SQ EPI CELLS URNS QL MICRO: ABNORMAL /HPF
PH UR STRIP.AUTO: 5.5 [PH]
PROT UR STRIP-MCNC: ABNORMAL MG/DL
RBC #/AREA URNS AUTO: ABNORMAL /HPF
SP GR UR STRIP.AUTO: 1.02 (ref 1–1.03)
UROBILINOGEN UR STRIP-ACNC: <2 MG/DL
WBC #/AREA URNS AUTO: ABNORMAL /HPF

## 2022-11-30 LAB — BACTERIA UR CULT: NORMAL

## 2022-11-30 NOTE — PRE-PROCEDURE INSTRUCTIONS
Pre-Surgery Instructions:   Medication Instructions   • albuterol (Ventolin HFA) 90 mcg/act inhaler Uses PRN- OK to take day of surgery   • guaiFENesin (Mucinex) 600 mg 12 hr tablet Uses PRN- OK to take day of surgery   • metoprolol succinate (TOPROL-XL) 50 mg 24 hr tablet Take day of surgery  • phenazopyridine (PYRIDIUM) 200 mg tablet Uses PRN- DO NOT take day of surgery   Pre-op medication, and showering instructions with antibacteral soap reviewed  Pt  Verbalized understanding of current visitor restrictions  Pt  Verbalized an understanding of all instructions reviewed and offers no concerns at this time  Instructed to avoid all ASA/NSAIDs and OTC Vit/Supp from now until after surgery per anesthesia guidelines   Tylenol ok prn  DOS meds with a few sips of H2O

## 2022-12-05 ENCOUNTER — HOSPITAL ENCOUNTER (OUTPATIENT)
Facility: HOSPITAL | Age: 57
Setting detail: OUTPATIENT SURGERY
Discharge: HOME | End: 2022-12-05
Attending: UROLOGY | Admitting: UROLOGY

## 2022-12-05 ENCOUNTER — ANESTHESIA (OUTPATIENT)
Dept: PERIOP | Facility: HOSPITAL | Age: 57
End: 2022-12-05

## 2022-12-05 ENCOUNTER — ANESTHESIA EVENT (OUTPATIENT)
Dept: PERIOP | Facility: HOSPITAL | Age: 57
End: 2022-12-05

## 2022-12-05 VITALS
BODY MASS INDEX: 28.35 KG/M2 | RESPIRATION RATE: 18 BRPM | WEIGHT: 198 LBS | TEMPERATURE: 97.9 F | DIASTOLIC BLOOD PRESSURE: 75 MMHG | HEIGHT: 70 IN | SYSTOLIC BLOOD PRESSURE: 141 MMHG | OXYGEN SATURATION: 96 % | HEART RATE: 60 BPM

## 2022-12-05 DIAGNOSIS — R31.0 GROSS HEMATURIA: ICD-10-CM

## 2022-12-05 DIAGNOSIS — N32.89 BLADDER MASS: Primary | ICD-10-CM

## 2022-12-05 DIAGNOSIS — C67.3 MALIGNANT NEOPLASM OF ANTERIOR WALL OF URINARY BLADDER (HCC): ICD-10-CM

## 2022-12-05 RX ORDER — PROPOFOL 10 MG/ML
INJECTION, EMULSION INTRAVENOUS AS NEEDED
Status: DISCONTINUED | OUTPATIENT
Start: 2022-12-05 | End: 2022-12-05

## 2022-12-05 RX ORDER — MIDAZOLAM HYDROCHLORIDE 2 MG/2ML
INJECTION, SOLUTION INTRAMUSCULAR; INTRAVENOUS AS NEEDED
Status: DISCONTINUED | OUTPATIENT
Start: 2022-12-05 | End: 2022-12-05

## 2022-12-05 RX ORDER — FENTANYL CITRATE/PF 50 MCG/ML
25 SYRINGE (ML) INJECTION
Status: DISCONTINUED | OUTPATIENT
Start: 2022-12-05 | End: 2022-12-05 | Stop reason: HOSPADM

## 2022-12-05 RX ORDER — GLYCINE 1.5 G/100ML
SOLUTION IRRIGATION AS NEEDED
Status: DISCONTINUED | OUTPATIENT
Start: 2022-12-05 | End: 2022-12-05 | Stop reason: HOSPADM

## 2022-12-05 RX ORDER — ONDANSETRON 2 MG/ML
4 INJECTION INTRAMUSCULAR; INTRAVENOUS ONCE AS NEEDED
Status: DISCONTINUED | OUTPATIENT
Start: 2022-12-05 | End: 2022-12-05 | Stop reason: HOSPADM

## 2022-12-05 RX ORDER — LEVOFLOXACIN 500 MG/1
500 TABLET, FILM COATED ORAL EVERY 24 HOURS
Qty: 3 TABLET | Refills: 0 | Status: SHIPPED | OUTPATIENT
Start: 2022-12-05 | End: 2022-12-08

## 2022-12-05 RX ORDER — SODIUM CHLORIDE, SODIUM LACTATE, POTASSIUM CHLORIDE, CALCIUM CHLORIDE 600; 310; 30; 20 MG/100ML; MG/100ML; MG/100ML; MG/100ML
INJECTION, SOLUTION INTRAVENOUS CONTINUOUS PRN
Status: DISCONTINUED | OUTPATIENT
Start: 2022-12-05 | End: 2022-12-05

## 2022-12-05 RX ORDER — LEVOFLOXACIN 5 MG/ML
750 INJECTION, SOLUTION INTRAVENOUS ONCE
Status: COMPLETED | OUTPATIENT
Start: 2022-12-05 | End: 2022-12-05

## 2022-12-05 RX ORDER — PHENAZOPYRIDINE HYDROCHLORIDE 100 MG/1
200 TABLET, FILM COATED ORAL ONCE
Status: COMPLETED | OUTPATIENT
Start: 2022-12-05 | End: 2022-12-05

## 2022-12-05 RX ORDER — SODIUM CHLORIDE, SODIUM LACTATE, POTASSIUM CHLORIDE, CALCIUM CHLORIDE 600; 310; 30; 20 MG/100ML; MG/100ML; MG/100ML; MG/100ML
125 INJECTION, SOLUTION INTRAVENOUS CONTINUOUS
Status: CANCELLED | OUTPATIENT
Start: 2022-12-05

## 2022-12-05 RX ORDER — LIDOCAINE HYDROCHLORIDE 10 MG/ML
INJECTION, SOLUTION EPIDURAL; INFILTRATION; INTRACAUDAL; PERINEURAL AS NEEDED
Status: DISCONTINUED | OUTPATIENT
Start: 2022-12-05 | End: 2022-12-05

## 2022-12-05 RX ORDER — PHENAZOPYRIDINE HYDROCHLORIDE 200 MG/1
200 TABLET, FILM COATED ORAL 3 TIMES DAILY PRN
Qty: 30 TABLET | Refills: 0 | Status: SHIPPED | OUTPATIENT
Start: 2022-12-05

## 2022-12-05 RX ORDER — HYDROCODONE BITARTRATE AND ACETAMINOPHEN 5; 325 MG/1; MG/1
1 TABLET ORAL EVERY 4 HOURS PRN
Qty: 20 TABLET | Refills: 0 | Status: SHIPPED | OUTPATIENT
Start: 2022-12-05 | End: 2022-12-15

## 2022-12-05 RX ORDER — ONDANSETRON 2 MG/ML
INJECTION INTRAMUSCULAR; INTRAVENOUS AS NEEDED
Status: DISCONTINUED | OUTPATIENT
Start: 2022-12-05 | End: 2022-12-05

## 2022-12-05 RX ORDER — HYDROCODONE BITARTRATE AND ACETAMINOPHEN 5; 325 MG/1; MG/1
1 TABLET ORAL EVERY 6 HOURS PRN
Status: DISCONTINUED | OUTPATIENT
Start: 2022-12-05 | End: 2022-12-05 | Stop reason: HOSPADM

## 2022-12-05 RX ORDER — ALBUTEROL SULFATE 2.5 MG/3ML
2.5 SOLUTION RESPIRATORY (INHALATION) ONCE AS NEEDED
Status: DISCONTINUED | OUTPATIENT
Start: 2022-12-05 | End: 2022-12-05 | Stop reason: HOSPADM

## 2022-12-05 RX ORDER — HYDROCODONE BITARTRATE AND ACETAMINOPHEN 5; 325 MG/1; MG/1
1 TABLET ORAL EVERY 6 HOURS PRN
Status: DISCONTINUED | OUTPATIENT
Start: 2022-12-05 | End: 2022-12-05

## 2022-12-05 RX ORDER — FENTANYL CITRATE 50 UG/ML
INJECTION, SOLUTION INTRAMUSCULAR; INTRAVENOUS AS NEEDED
Status: DISCONTINUED | OUTPATIENT
Start: 2022-12-05 | End: 2022-12-05

## 2022-12-05 RX ORDER — DEXAMETHASONE SODIUM PHOSPHATE 4 MG/ML
INJECTION, SOLUTION INTRA-ARTICULAR; INTRALESIONAL; INTRAMUSCULAR; INTRAVENOUS; SOFT TISSUE AS NEEDED
Status: DISCONTINUED | OUTPATIENT
Start: 2022-12-05 | End: 2022-12-05

## 2022-12-05 RX ADMIN — HYDROCODONE BITARTRATE AND ACETAMINOPHEN 1 TABLET: 5; 325 TABLET ORAL at 12:05

## 2022-12-05 RX ADMIN — SODIUM CHLORIDE, SODIUM LACTATE, POTASSIUM CHLORIDE, AND CALCIUM CHLORIDE: .6; .31; .03; .02 INJECTION, SOLUTION INTRAVENOUS at 10:46

## 2022-12-05 RX ADMIN — PROPOFOL 200 MG: 10 INJECTION, EMULSION INTRAVENOUS at 10:50

## 2022-12-05 RX ADMIN — MIDAZOLAM HYDROCHLORIDE 2 MG: 1 INJECTION, SOLUTION INTRAMUSCULAR; INTRAVENOUS at 10:47

## 2022-12-05 RX ADMIN — LIDOCAINE HYDROCHLORIDE 50 MG: 10 INJECTION, SOLUTION EPIDURAL; INFILTRATION; INTRACAUDAL; PERINEURAL at 10:50

## 2022-12-05 RX ADMIN — FENTANYL CITRATE 50 MCG: 50 INJECTION, SOLUTION INTRAMUSCULAR; INTRAVENOUS at 10:55

## 2022-12-05 RX ADMIN — PHENAZOPYRIDINE 200 MG: 100 TABLET ORAL at 11:30

## 2022-12-05 RX ADMIN — LEVOFLOXACIN: 750 INJECTION, SOLUTION INTRAVENOUS at 10:46

## 2022-12-05 RX ADMIN — ONDANSETRON 4 MG: 2 INJECTION INTRAMUSCULAR; INTRAVENOUS at 11:08

## 2022-12-05 RX ADMIN — DEXAMETHASONE SODIUM PHOSPHATE 4 MG: 4 INJECTION, SOLUTION INTRAMUSCULAR; INTRAVENOUS at 10:55

## 2022-12-05 RX ADMIN — FENTANYL CITRATE 25 MCG: 50 INJECTION, SOLUTION INTRAMUSCULAR; INTRAVENOUS at 10:57

## 2022-12-05 RX ADMIN — FENTANYL CITRATE 25 MCG: 50 INJECTION, SOLUTION INTRAMUSCULAR; INTRAVENOUS at 11:05

## 2022-12-05 NOTE — OP NOTE
OPERATIVE REPORT  PATIENT NAME: Osbaldo Smyth    :  1965  MRN: [id number]  Pt Location: [facility] OR ROOM 02    SURGERY DATE: 2022    Surgeon(s) and Role:     * Renuka Camargo MD - Primary    Preop Diagnosis:  Malignant neoplasm of anterior wall of urinary bladder ([address] ) [C67 3]  Gross hematuria [R31 0]    Postoperative diagnosis:  Ulcer of the right anterior lateral wall    Procedure(s) (LRB):  cysto bladder lesion biopsy fulguration (N/A)    Specimen(s):  ID Type Source Tests Collected by Time Destination   1 : right lateral wall lesion Tissue Urinary Bladder TISSUE EXAM Renuka Camargo MD 2022 1107        Estimated Blood Loss:   Minimal    Drains:  * No LDAs found *    Anesthesia Type:   General/LMA    Operative Indications:  Malignant neoplasm of anterior wall of urinary bladder (HCC) [C67 3]  Gross hematuria [R31 0]      Operative Findings:  No actual tumor consistent with transitional cell carcinoma seen  There is an ulcerated lesion with blood blisters consistent with recent bleeding right anterior lateral wall  This was biopsied and then the entire area fulgurated  It was about 2-3 cm  Complications:   None    Procedure and Technique:  26-year-old man with history of bladder cancer status post TURBT by me with mitomycin 3 months ago who has recurrent gross hematuria and severe dysuria  He may have had a reaction to the mitomycin which was given 3 months ago, but CT scan shows abnormalities to the anterior wall the bladder concerning for recurrence of cancer  I therefore offered him cystoscopy in the operating room, and biopsy/TURBT as indicated  No further mitomycin  The risks of bleeding infection damage urinary tract need for additional procedures were explained he gives informed consent  Patient brought to the operating room identified properly  LMA was induced patient was prepped draped in dorsal lithotomy position usual fashion  A time-out was performed    Cystoscopy was carried out with a 22 Russian cystoscopy sheath and 30 degree lens  The urethra was normal without stricture  The prostate was minimally to moderate obstructive with a high bladder neck     The bladder was smooth not trabeculated and there was no overt evidence of transitional cell carcinoma recurrence  However along the right lateral wall there was a collection of blood blisters overlying a raised area  Using the cup forceps, I took a deep biopsy of this and then fulgurated that area along with the rest of the lesion  There is also an area of hypervascularity near the bladder neck that I fulgurated  The bladder was drained     I was present for the entire procedure and A qualified resident physician was not available    Patient Disposition:  PACU  and extubated and stable        SIGNATURE: Judith Copeland MD  DATE: December 5, 2022  TIME: 11:13 AM

## 2022-12-05 NOTE — DISCHARGE INSTRUCTIONS
Expect to see blood in the urine, and have burning urgency and frequency of urination  No heavy lifting greater than 15 lb for the next week or so  Take over-the-counter remedies to avoid constipation  Call for fever greater than 101 5°, severe pain not relieved by over-the-counter pain medicines or Pyridium, or inability to urinate  I found an ulcer that looked like it was bleeding at 1 time  This was biopsied and burned  No driving or operating machinery if taking narcotics  How to Stop Smoking   WHAT YOU NEED TO KNOW:   You will improve your health and the health of others around you if you stop smoking  Your risk for heart and lung disease, cancer, stroke, heart attack, and vision problems will also decrease  Your adolescent can help prevent or stop harm to his or her brain or body  This will help him or her become a healthy adult  You can benefit from quitting no matter how long you have smoked  DISCHARGE INSTRUCTIONS:   Prepare to stop smoking:  Nicotine is a highly addictive drug found in cigarettes  Withdrawal symptoms can happen when you stop smoking and make it hard to quit  These include anxiety, depression, irritability, trouble sleeping, and increased appetite  You increase your chances of success if you prepare to quit  Set a quit date  Mike Robertson a date that is within the next 2 weeks  Do not pick a day that you think may be stressful or busy  Write down the day or [place] it on your calender  Tell friends and family that you plan to quit  Explain that you may have withdrawal symptoms when you try to quit  Ask them to support you  They may be able to encourage you and help reduce your stress to make it easier for you to quit  Make a list of your reasons for quitting  Put the list somewhere you will see it every day, such as your refrigerator  You can look at the list when you have a craving  Remove all tobacco and nicotine products from your home, car, and workplace    Also, remove anything else that will tempt you to smoke, such as lighters, matches, or ashtrays  Clean your car, home, and places at work that smell like smoke  The smell of smoke can trigger a craving  Identify triggers that make you want to smoke  This may include activities, feelings, or people  Also write down 1 way you can deal with each of your triggers  For example, if you want to smoke as soon as you wake up, plan another activity during this time, such as exercise  Make a plan for how you will quit  Learn about the tools that can help you quit, such as medicine, counseling, or nicotine replacement therapy  Choose at least 2 options to help you quit  Help your adolescent make a plan to quit  The plan will be more successful if your adolescent makes his or her own decisions  Do not try to pressure him or her to quit immediately or in a certain way  Be supportive and offer help if needed  Tools to help you stop smoking:   Counseling  from a trained healthcare provider can provide you with support and skills to quit smoking  The provider will also teach you to manage your withdrawal symptoms and cravings  You may receive counseling from one counselor, in group therapy, or through phone therapy called a quit line  Nicotine replacement therapy (NRT)  such as nicotine patches, gum, or lozenges may help reduce your nicotine cravings  You may get these without a doctor's order  Do not use e-cigarettes or smokeless tobacco in place of cigarettes or to help you quit  They still contain nicotine  Prescription medicines  such as nasal sprays or nicotine inhalers may help reduce your withdrawal symptoms  Other medicines may also be used to reduce your urge to smoke  Ask your healthcare provider about these medicines  You may need to start certain medicines 2 weeks before your quit date for them to work well  Hypnosis  is a practice that helps guide you through thoughts and feelings   Hypnosis may help decrease your cravings and make you more willing to quit  Acupuncture therapy  uses very thin needles to balance energy channels in the body  This is thought to help decrease cravings and symptoms of nicotine withdrawal     Support groups  let you talk to others who are trying to quit or have already quit  It may be helpful to speak with others about how they quit  Manage your cravings:   Avoid situations, people, and places that tempt you to smoke  Go to nonsmoking places, such as libraries or restaurants  Understand what tempts you and try to avoid these things  Keep your hands busy  Hold things such as a stress ball or pen  Put candy or toothpicks in your mouth  Keep lollipops, sugarless gum, or toothpicks with you at all times  Do not have alcohol or caffeine  These drinks may tempt you to smoke  Drink healthy liquids such as water or juice instead  Reward yourself when you resist your cravings  Rewards will motivate you and help you stay positive  Do an activity that distracts you from your craving  Examples include cleaning, creating art, or gardening  Prevent weight gain after you quit:  You may gain a few pounds after you quit smoking  It is healthier for you to gain a few pounds than to continue to smoke  The following can help you prevent weight gain:  Eat a variety of healthy foods  Healthy foods include fruits, vegetables, whole-grain breads, low-fat dairy products, beans, lean meats, and fish  Eat healthy snacks, such as low-fat yogurt, if you get hungry between meals  Drink water before, during, and between meals  This will make your stomach feel full and help prevent you from overeating  Ask your healthcare provider how much liquid to drink each day and which liquids are best for you  Be physically active  Activity may help reduce your cravings and reduce stress  Take a walk or do some kind of physical activity every day   Ask your healthcare provider which activities are right for you  For support and more information:   American [employer] Association  [address] [facility]  [address]  Phone: [facility] [address]  Phone: 4- 812 - 408-2050  Web Address: Josiecatygerald Leblancmil iraheta    Smokefree  gov  Phone: 2- 371 - 187-2968  Web Address: www smokefree    [address] 2022 Information is for End User's use only and may not be sold, redistributed or otherwise used for commercial purposes  All illustrations and images included in CareNotes® are the copyrighted property of [employer]  or [address] [place] [address]  The above information is an  only  It is not intended as medical advice for individual conditions or treatments  Talk to your doctor, nurse or pharmacist before following any medical regimen to see if it is safe and effective for you

## 2022-12-05 NOTE — ANESTHESIA PREPROCEDURE EVALUATION
Procedure:  TRANSURETHRAL RESECTION OF BLADDER TUMOR (TURBT) (No mitomycin) (Bladder)    Prior GA LMA 4  TTE Stress: EF 60% with G1DD, AV with no significant stenosis but be was recommended a formal resting TTE  Perfusion Stress: No evidence of ischemia   Eliquis for hx DVT  Metoprolol     Denies the following: CP/SOB with exertion, asthma, COPD, GLENN, stroke/TIA, seizure    Relevant Problems   CARDIO   (+) Hypertension      NEURO/PSYCH   (+) History of DVT (deep vein thrombosis)      PULMONARY   (+) Smoking        Physical Exam    Airway    Mallampati score: II  TM Distance: >3 FB  Neck ROM: full     Dental       Cardiovascular      Pulmonary      Other Findings  Very poor dentition  Risk of tooth injury discussed with patient  Anesthesia Plan  ASA Score- 2     Anesthesia Type- general with ASA Monitors  Additional Monitors:   Airway Plan:           Plan Factors-Exercise tolerance (METS): >4 METS  Chart reviewed  EKG reviewed  Existing labs reviewed  Patient summary reviewed  Patient is a current smoker  Obstructive sleep apnea risk education given perioperatively  Induction-     Postoperative Plan-     Informed Consent- Anesthetic plan and risks discussed with patient  I personally reviewed this patient with the CRNA  Discussed and agreed on the Anesthesia Plan with the CRNA  Karrie Nissen

## 2022-12-05 NOTE — ANESTHESIA POSTPROCEDURE EVALUATION
Post-Op Assessment Note    CV Status:  Stable    Pain management: adequate     Mental Status:  Alert and awake   Hydration Status:  Euvolemic   PONV Controlled:  Controlled   Airway Patency:  Patent      Post Op Vitals Reviewed: Yes      Staff: CRNA         No notable events documented      BP   129/71   Temp     Pulse  59   Resp   18   SpO2   98%

## 2022-12-05 NOTE — H&P
H&P Exam - Urology   Krissy Honeycutt 1965 [id number]      Assessment/Plan     Assessment:  Bladder cancer, with gross hematuria and CT scan showing concerning growth in the anterior wall the bladder  Cannot tolerate mitomycin  Plan:  Cystoscopy, TURBT, fulguration  History of Present Illness   HPI:  The patient presents for the above procedure  I saw him 11/15/2022 on telemedicine he was complaining of severe dysuria which happened after the last procedure, which is TURBT with mitomycin 9/9/2022  He was having some gross hematuria as well and seen in the emergency department  Urine culture negative  He has nocturia every 1-1 5 hours  CT scan showed normal kidneys but there was a concerning growth anterior wall the bladder  He therefore has been set up for cystoscopy, clot evacuation, fulguration possible TURBT  The risks of bleeding, infection, damage urinary tract and need for additional procedures has been explained and informed consent given  Past Medical History:   Diagnosis Date   • Bladder cancer ([facility] )    • Cancer ([facility] )    • DVT (deep venous thrombosis) ([facility] ) 09/2021    Right   • Hypertension        Past Surgical History:   Procedure Laterality Date   • ANKLE SURGERY Right    • CYSTOSCOPY N/A 08/30/2021    Procedure: CYSTOSCOPY;  Surgeon: Chayo Pritchard MD;  Location:  MAIN OR;  Service: Urology   • CYSTOSCOPY     • OR CYSTOURETHROSCOPY,BIOPSY N/A 12/06/2021    Procedure: CYSTOSCOPY, TURBT right lateral wall;  Surgeon: Chayo Pritchard MD;  Location: [facility] MA[facility] OR;  Service: Urology   • OR CYSTOURETHROSCOPY,FULGUR <0 5 CM LESN N/A 09/08/2022    Procedure: TRANSURETHRAL RESECTION OF BLADDER TUMOR (TURBT) and mitomycin installation;  Surgeon: Chayo Pritchard MD;  Location: [facility];  Service: Urology   • TONSILLECTOMY     • TRANSURETHRAL RESECTION OF BLADDER TUMOR N/A 08/30/2021    Procedure: TRANSURETHRAL RESECTION OF BLADDER TUMOR (TURBT);   Surgeon: Chayo Pritchard MD;  Location: [facility] MA[facility];  Service: Urology           Review of systems:    General: No fever  CVS: No chest pain or new SOB  Abdomen: No diarrhea or blood in stool  : No new voiding difficulties  Neuro: No syncope/weakness/loss of sensation/paresis  Ophthalmologic: No new visual changes  Ortho: No new back/joint pains  Social History- as per previous notes  Family History: non-contributory    Meds/Allergies   PTA meds:   Cannot display prior to admission medications because the patient has not been admitted in this contact  Objective   Vitals: Ht 5' 10" (1 778 m)   Wt 89 8 kg (198 lb)   BMI 28 41 kg/m²     No intake/output data recorded  Physical Exam:    Awake, alert, in NAD  HEENT: Atraumatic, Normocephalic    Lungs: Normal Respiratory effort, no wheezes,stridor or rales  CVS- RRR    Abdomen: Nondistended  Extremiites: Normal ROM, no cyanosis/edema  Lab Results: I have personally reviewed pertinent reports  Imaging: I have personally reviewed pertinent reports     and I have personally reviewed pertinent films in PACS

## 2022-12-06 ENCOUNTER — TELEPHONE (OUTPATIENT)
Dept: UROLOGY | Facility: CLINIC | Age: 57
End: 2022-12-06

## 2022-12-06 NOTE — TELEPHONE ENCOUNTER
Post Op Note    Osbaldo Smyth is a 62 y o  male s/p cysto bladder lesion biopsy fulguration (Bladder) performed 12/5/2022  Osbaldo Smyth is a patient of Dr Horace Ponce and is seen at the [facility] office  How would you rate your pain on a scale from 1 to 10, 10 being the worst pain ever? 0- Patient reports no current pain, just some burning with urination   Have you had a fever? No    Do you have any difficulty urinating? No    Do you have any other questions or concerns that I can address at this time? Patient states he is feeling better than he has the last month and a half  No pain, just some burning with urination which is to be expected  Advised adequate hydration with water and confirmed appointment with Dr Horace Ponce on 12/23/22

## 2022-12-11 ENCOUNTER — VBI (OUTPATIENT)
Dept: ADMINISTRATIVE | Facility: OTHER | Age: 57
End: 2022-12-11

## 2022-12-23 ENCOUNTER — OFFICE VISIT (OUTPATIENT)
Dept: UROLOGY | Facility: MEDICAL CENTER | Age: 57
End: 2022-12-23

## 2022-12-23 VITALS — BODY MASS INDEX: 28.41 KG/M2 | HEIGHT: 70 IN

## 2022-12-23 DIAGNOSIS — C67.3 MALIGNANT NEOPLASM OF ANTERIOR WALL OF URINARY BLADDER (HCC): Primary | ICD-10-CM

## 2022-12-23 NOTE — PROGRESS NOTES
[address] [facility]  [facility]  [address] [facility]  [place], [address] [facility]  [phone]  www  [facility]  org      NAME: Bello Mackey  AGE: 62 y o  SEX: male  : 1965   MRN: [id number]    DATE: 2022  TIME: 3:20 PM    Assessment and Plan:    Bladder pain with bladder spasms with referral to the urethra-most likely due to the mitomycin that I gave him in September with his TURBT  Since fulgurating and biopsying that lesion he seems to be doing better although he is still having some symptoms  This should resolve on their own and he can take Pyridium as needed  He is doing much better- prior to  This procedure, "it was a nightmare"    Bladder cancer: Repeat cystoscopy in 3 months  He cannot receive mitomycin anymore due to the inflammatory effects  His original tumor was 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 underwent TURBT by me 2021 and then underwent a relook 2021  That showed 1 small papillary recurrence  He did not receive BCG  Chief Complaint     Chief Complaint   Patient presents with   • Post-op       History of Present Illness   Bladder cancer follow-up: Had recent bladder biopsy of what looked like blood blisters in his bladder in an isolated area by me 2022 and 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  No sign of cancer  He still complains of occasional razor blade feelings in the urethra  This started basically after getting mitomycin instilled postoperatively 2022 after his TURBT then  The day after surgery  he felt better than he had in a month          The following portions of the patient's history were reviewed and updated as appropriate: allergies, current medications, past family history, past medical history, past social history, past surgical history and problem list   Past Medical History:   Diagnosis Date   • Bladder cancer [facility])    • Cancer [facility])    • DVT (deep venous thrombosis) ([facility] ) 09/2021    Right   • Hypertension      Past Surgical History:   Procedure Laterality Date   • ANKLE SURGERY Right    • CYSTOSCOPY N/A 08/30/2021    Procedure: CYSTOSCOPY;  Surgeon: Sondra Baldwin MD;  Location: [facility] MA[facility] OR;  Service: Urology   • CYSTOSCOPY     • IA CYSTO W/REMOVAL OF LESIONS SMALL N/A 09/08/2022    Procedure: TRANSURETHRAL RESECTION OF BLADDER TUMOR (TURBT) and mitomycin installation;  Surgeon: Sondra Baldwin MD;  Location: [facility];  Service: Urology   • IA CYSTO W/REMOVAL OF LESIONS SMALL N/A 12/5/2022    Procedure: cysto bladder lesion biopsy fulguration;  Surgeon: Sondra Baldwin MD;  Location: [facility];  Service: Urology   • IA CYSTOURETHROSCOPY WITH BIOPSY N/A 12/06/2021    Procedure: CYSTOSCOPY, TURBT right lateral wall;  Surgeon: Sondra Baldwin MD;  Location: [facility];  Service: Urology   • TONSILLECTOMY     • TRANSURETHRAL RESECTION OF BLADDER TUMOR N/A 08/30/2021    Procedure: TRANSURETHRAL RESECTION OF BLADDER TUMOR (TURBT); Surgeon: Sondra Balwdin MD;  Location: [facility];  Service: Urology     shoulder  Review of Systems   Review of Systems   Constitutional: Negative for fever  Respiratory: Negative for shortness of breath  Cardiovascular: Negative for chest pain and leg swelling  Genitourinary: Negative for hematuria  Active Problem List     Patient Active Problem List   Diagnosis   • Smoking   • Bladder mass   • History of DVT (deep vein thrombosis)   • Hypertension   • Chronic anticoagulation   • Malignant neoplasm of anterior wall of bladder (HCC)   • Mild tetrahydrocannabinol (THC) abuse       Objective   Ht 5' 10" (1 778 m)   BMI 28 41 kg/m²     Physical Exam  Vitals reviewed  Constitutional:       Appearance: Normal appearance  He is normal weight  HENT:      Head: Normocephalic and atraumatic  Eyes:      Extraocular Movements: Extraocular movements intact     Pulmonary: Effort: Pulmonary effort is normal    Musculoskeletal:         General: Normal range of motion  Cervical back: Normal range of motion  Skin:     Coloration: Skin is not jaundiced or pale  Neurological:      General: No focal deficit present  Mental Status: He is alert and oriented to person, place, and time  Mental status is at baseline  Psychiatric:         Mood and Affect: Mood normal          Behavior: Behavior normal          Thought Content:  Thought content normal          Judgment: Judgment normal              Current Medications     Current Outpatient Medications:   •  metoprolol succinate (TOPROL-XL) 50 mg 24 hr tablet, TAKE 1 TABLET BY MOUTH EVERY DAY (Patient taking differently: 50 mg every morning), Disp: 30 tablet, Rfl: 11  •  phenazopyridine (PYRIDIUM) 200 mg tablet, Take 1 tablet (200 mg total) by mouth 3 (three) times a day as needed for bladder spasms, Disp: 30 tablet, Rfl: 0  •  albuterol (Ventolin HFA) 90 mcg/act inhaler, Inhale 2 puffs every 6 (six) hours as needed for wheezing (Patient not taking: Reported on 12/23/2022), Disp: 18 g, Rfl: 5  •  guaiFENesin (Mucinex) 600 mg 12 hr tablet, Take 1 tablet (600 mg total) by mouth every 12 (twelve) hours (Patient not taking: Reported on 12/23/2022), Disp: 20 tablet, Rfl: 0  •  phenazopyridine (PYRIDIUM) 200 mg tablet, Take 1 tablet (200 mg total) by mouth 3 (three) times a day as needed for bladder spasms (Patient not taking: Reported on 12/23/2022), Disp: 30 tablet, Rfl: 0        Henrik Coates MD

## 2023-01-30 ENCOUNTER — TELEPHONE (OUTPATIENT)
Dept: OTHER | Facility: OTHER | Age: 58
End: 2023-01-30

## 2023-01-30 NOTE — TELEPHONE ENCOUNTER
Patient is scheduled for cystoscopy at [place] with Dr Eloise Simms 3/28/23 at 8:30 AM   Pt aware  Appt card mailed

## 2023-01-30 NOTE — TELEPHONE ENCOUNTER
Patient called  States that he is supposed to have a Scope appt, three months after his surgery on December 05, 2022  Would like a callback from the office

## 2023-03-27 ENCOUNTER — NURSE TRIAGE (OUTPATIENT)
Dept: OTHER | Facility: OTHER | Age: 58
End: 2023-03-27

## 2023-03-27 NOTE — TELEPHONE ENCOUNTER
"Regarding: prep for  cystoscopy  ----- Message from Sheron Murillo sent at 3/27/2023  3:38 PM EDT -----  I am scheduled for cystoscopy tomorrow and I want to know if there is any eating or drinking restrictions \"    "

## 2023-03-27 NOTE — TELEPHONE ENCOUNTER
"  Reason for Disposition  • Information only question and nurse able to answer    Answer Assessment - Initial Assessment Questions  1  REASON FOR CALL or QUESTION: \"What is your reason for calling today? \" or \"How can I best help you? \" or \"What question do you have that I can help answer? \"      \"Can I eat and drink before my cystoscopy procedure tomorrow? \"    Protocols used: INFORMATION ONLY CALL - NO TRIAGE-ADULT-[place]    "

## 2023-03-28 ENCOUNTER — VBI (OUTPATIENT)
Dept: ADMINISTRATIVE | Facility: OTHER | Age: 58
End: 2023-03-28

## 2023-03-28 ENCOUNTER — PROCEDURE VISIT (OUTPATIENT)
Dept: UROLOGY | Facility: CLINIC | Age: 58
End: 2023-03-28

## 2023-03-28 VITALS
HEART RATE: 79 BPM | DIASTOLIC BLOOD PRESSURE: 80 MMHG | HEIGHT: 70 IN | BODY MASS INDEX: 28.77 KG/M2 | SYSTOLIC BLOOD PRESSURE: 140 MMHG | WEIGHT: 201 LBS

## 2023-03-28 DIAGNOSIS — Z85.51 HISTORY OF BLADDER CANCER: ICD-10-CM

## 2023-03-28 DIAGNOSIS — Z12.5 PROSTATE CANCER SCREENING: Primary | ICD-10-CM

## 2023-03-28 LAB
SL AMB  POCT GLUCOSE, UA: NORMAL
SL AMB LEUKOCYTE ESTERASE,UA: NORMAL
SL AMB POCT BILIRUBIN,UA: NORMAL
SL AMB POCT BLOOD,UA: NORMAL
SL AMB POCT CLARITY,UA: CLEAR
SL AMB POCT COLOR,UA: YELLOW
SL AMB POCT KETONES,UA: NORMAL
SL AMB POCT NITRITE,UA: NORMAL
SL AMB POCT PH,UA: 6.5
SL AMB POCT SPECIFIC GRAVITY,UA: 1.02
SL AMB POCT URINE PROTEIN: NORMAL
SL AMB POCT UROBILINOGEN: 0.2

## 2023-03-28 RX ORDER — CIPROFLOXACIN 500 MG/1
500 TABLET, FILM COATED ORAL ONCE
Qty: 1 TABLET | Refills: 0 | Status: SHIPPED | OUTPATIENT
Start: 2023-03-28 | End: 2023-03-28

## 2023-03-28 NOTE — PATIENT INSTRUCTIONS
the Cipro from the pharmacy as soon as possible and take it  Have PSA done in a few weeks which is a blood test done in the lab  There will be an order in the computer for you just need to walk into the lab to have it drawn  No sexual activity for 3 days prior to it

## 2023-03-28 NOTE — LETTER
2023     [facility] [address] [facility]    Patient: Corinne Lancaster   YOB: 1965   Date of Visit: 3/28/2023       Dear Dr Saldaña Katherine: Thank you for referring Corinne Lancaster to me for evaluation  Below are my notes for this consultation  If you have questions, please do not hesitate to call me  I look forward to following your patient along with you  Sincerely,        Edith Gil MD        CC: No Recipients  Edith Gil MD  3/28/2023  9:00 AM  Sign when Signing Visit  [facility]  [address], [address] [place] [address]  [phone]  www  [facility]  org      NAME: Corinne Lancaster  AGE: 62 y o  SEX: male  : 1965   MRN: [id number]    DATE: 3/28/2023  TIME: 8:47 AM    Assessment and Plan:    Bladder cancer as below-no recurrence, repeat cystoscopy in 3 months  He continues to smoke  We discussed this  He has no plans in terms of stopping as when he did quit 8 years ago, he gained 300 pounds  Cipro sent to his pharmacy  No role for adjuvant therapy given his reaction to mitomycin  Prostate cancer screening: Check PSA in a couple of weeks and I will send him the results  Chief Complaint   No chief complaint on file  History of Present Illness   Bladder cancer follow-up: 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 status post TURBT by me 2021, had a relook 2021 that showed a small papillary recurrence then found 2022 to have recurrence with 1 cm tumor left posterior wall and 1 cm tumor right dome underwent transurethral resection of these 2022 which turned out to be noninvasive low-grade papillary urothelial carcinoma  Muscle was present and was uninvolved  He had mitomycin instilled    He then underwent TURBT for a right lateral wall lesion 2022 by me which showed "urothelial dysplasia with involvement of cystitis cystica and no cancer  He has never had BCG  After the mitomycin in September with a TURBT he had severe bladder pain with bladder spasms with referral to the urethra  He referred to it as [place] nightmare\"  He cannot receive mitomycin anymore due to the inflammatory effects  This is also the reason why no BCG  Here for surveillance cystoscopy  CT scan abdomen and pelvis November 8, 2022 showed an irregular enhancing partially circumferential bladder wall thickening with nodular margins anterior wall and dome but continuing along the anterior right bladder wall  Kidneys were okay  No sign metastases  Of course that area on the bladder was followed up with the TURBT of benign lesions in December  Voiding \"good\"  Prostate cancer screening: PSA 2 6 November 4, 2021, nothing since  Order has been placed for this  Cystoscopy     Date/Time 3/28/2023 8:24 AM     Performed by  Yanet Mcmullen MD     Authorized by Yanet Mcmullen MD          Procedure Details:  Procedure type: cystoscopy    Additional Procedure Details: Cystoscopy Procedure Note        Pre-operative Diagnosis: Bladder cancer surveillance    Post-operative Diagnosis: Same, no recurrence    Procedure: Flexible cystoscopy    Surgeon: Kia Haro MD    Anesthesia: 1% Xylocaine per urethra    EBL: Minimal    Complications: none    Procedure Details   The risks, benefits, complications, treatment options, and expected outcomes were discussed with the patient  The patient concurred with the proposed plan, giving informed consent  Cystoscopy was performed today under local anesthesia, using sterile technique  The patient was placed in the supine position, prepped with Betadine, and draped in the usual sterile fashion  The flexible cystocope was used to inspect both the urethra and bladder    Findings:  Urethra: Normal without stricture  No major prostate occlusion      Bladder:  Smooth, not trabeculated " and there were no stones tumors or other lesions  The orifices were orthotopic and intact  No recurrence  Specimens: None                 Complications:  None           Disposition: To home            Condition:  Stable          The following portions of the patient's history were reviewed and updated as appropriate: allergies, current medications, past family history, past medical history, past social history, past surgical history and problem list   Past Medical History:   Diagnosis Date   • Bladder cancer ([facility] [address] [address] )    • Cancer ([facility] )    • DVT (deep venous thrombosis) ([facility] ) 09/2021    Right   • Hypertension      Past Surgical History:   Procedure Laterality Date   • ANKLE SURGERY Right    • CYSTOSCOPY N/A 08/30/2021    Procedure: CYSTOSCOPY;  Surgeon: Virginia Lombardo MD;  Location:  MAIN OR;  Service: Urology   • CYSTOSCOPY     • WA CYSTO W/REMOVAL OF LESIONS SMALL N/A 09/08/2022    Procedure: TRANSURETHRAL RESECTION OF BLADDER TUMOR (TURBT) and mitomycin installation;  Surgeon: Virginia Lombardo MD;  Location: [facility];  Service: Urology   • WA CYSTO W/REMOVAL OF LESIONS SMALL N/A 12/5/2022    Procedure: cysto bladder lesion biopsy fulguration;  Surgeon: Virginia Lombardo MD;  Location: [facility];  Service: Urology   • WA CYSTOURETHROSCOPY WITH BIOPSY N/A 12/06/2021    Procedure: CYSTOSCOPY, TURBT right lateral wall;  Surgeon: Virginia Lombardo MD;  Location: [facility];  Service: Urology   • TONSILLECTOMY     • TRANSURETHRAL RESECTION OF BLADDER TUMOR N/A 08/30/2021    Procedure: TRANSURETHRAL RESECTION OF BLADDER TUMOR (TURBT); Surgeon: Virginia Lombardo MD;  Location: [facility] OR;  Service: Urology     shoulder  Review of Systems   Review of Systems   Genitourinary: Negative          Active Problem List     Patient Active Problem List   Diagnosis   • Smoking   • Bladder mass   • History of DVT (deep vein thrombosis)   • Hypertension   • Chronic anticoagulation   • Malignant neoplasm of anterior wall of bladder (HCC)   • "Mild tetrahydrocannabinol (THC) abuse       Objective   /80   Pulse 79   Ht 5' 10\" (1 778 m)   Wt 91 2 kg (201 lb)   BMI 28 84 kg/m²     Physical Exam  Vitals reviewed  Constitutional:       Appearance: Normal appearance  He is normal weight  HENT:      Head: Normocephalic and atraumatic  Eyes:      Extraocular Movements: Extraocular movements intact  Pulmonary:      Effort: Pulmonary effort is normal    Genitourinary:     Penis: Normal     Musculoskeletal:         General: Normal range of motion  Cervical back: Normal range of motion  Skin:     Coloration: Skin is not jaundiced or pale  Neurological:      General: No focal deficit present  Mental Status: He is alert and oriented to person, place, and time  Mental status is at baseline  Psychiatric:         Mood and Affect: Mood normal          Behavior: Behavior normal          Thought Content:  Thought content normal          Judgment: Judgment normal              Current Medications     Current Outpatient Medications:   •  metoprolol succinate (TOPROL-XL) 50 mg 24 hr tablet, TAKE 1 TABLET BY MOUTH EVERY DAY (Patient taking differently: 50 mg every morning), Disp: 30 tablet, Rfl: 11  •  albuterol (Ventolin HFA) 90 mcg/act inhaler, Inhale 2 puffs every 6 (six) hours as needed for wheezing (Patient not taking: Reported on 12/23/2022), Disp: 18 g, Rfl: 5  •  guaiFENesin (Mucinex) 600 mg 12 hr tablet, Take 1 tablet (600 mg total) by mouth every 12 (twelve) hours (Patient not taking: Reported on 12/23/2022), Disp: 20 tablet, Rfl: 0  •  phenazopyridine (PYRIDIUM) 200 mg tablet, Take 1 tablet (200 mg total) by mouth 3 (three) times a day as needed for bladder spasms (Patient not taking: Reported on 3/28/2023), Disp: 30 tablet, Rfl: 0  •  phenazopyridine (PYRIDIUM) 200 mg tablet, Take 1 tablet (200 mg total) by mouth 3 (three) times a day as needed for bladder spasms (Patient not taking: Reported on 12/23/2022), Disp: 30 tablet, Rfl: " 0        Sukhi Guerrier MD

## 2023-03-28 NOTE — PROGRESS NOTES
"[address] [facility]  [facility]  [address] [facility]  [place], [address] [facility]  [phone]  www  [facility]  org      NAME: Roz Nino  AGE: 62 y o  SEX: male  : 1965   MRN: [id number]    DATE: 3/28/2023  TIME: 8:47 AM    Assessment and Plan:    Bladder cancer as below-no recurrence, repeat cystoscopy in 3 months  He continues to smoke  We discussed this  He has no plans in terms of stopping as when he did quit 8 years ago, he gained 300 pounds  Cipro sent to his pharmacy  No role for adjuvant therapy given his reaction to mitomycin  Prostate cancer screening: Check PSA in a couple of weeks and I will send him the results  Chief Complaint   No chief complaint on file  History of Present Illness   Bladder cancer follow-up: 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 status post TURBT by me 2021, had a relook 2021 that showed a small papillary recurrence then found 2022 to have recurrence with 1 cm tumor left posterior wall and 1 cm tumor right dome underwent transurethral resection of these 2022 which turned out to be noninvasive low-grade papillary urothelial carcinoma  Muscle was present and was uninvolved  He had mitomycin instilled  He then underwent TURBT for a right lateral wall lesion 2022 by me which showed urothelial dysplasia with involvement of cystitis cystica and no cancer  He has never had BCG  After the mitomycin in September with a TURBT he had severe bladder pain with bladder spasms with referral to the urethra  He referred to it as [place] nightmare\"  He cannot receive mitomycin anymore due to the inflammatory effects  This is also the reason why no BCG  Here for surveillance cystoscopy    CT scan abdomen and pelvis 2022 showed an irregular enhancing partially circumferential bladder wall thickening with nodular margins " "anterior wall and dome but continuing along the anterior right bladder wall  Kidneys were okay  No sign metastases  Of course that area on the bladder was followed up with the TURBT of benign lesions in December  Voiding \"good\"  Prostate cancer screening: PSA 2 6 November 4, 2021, nothing since  Order has been placed for this  Cystoscopy     Date/Time 3/28/2023 8:24 AM     Performed by  Mathieu Castillo MD     Authorized by Mathieu Castillo MD          Procedure Details:  Procedure type: cystoscopy    Additional Procedure Details: Cystoscopy Procedure Note        Pre-operative Diagnosis: Bladder cancer surveillance    Post-operative Diagnosis: Same, no recurrence    Procedure: Flexible cystoscopy    Surgeon: Rudolph Valencia MD    Anesthesia: 1% Xylocaine per urethra    EBL: Minimal    Complications: none    Procedure Details   The risks, benefits, complications, treatment options, and expected outcomes were discussed with the patient  The patient concurred with the proposed plan, giving informed consent  Cystoscopy was performed today under local anesthesia, using sterile technique  The patient was placed in the supine position, prepped with Betadine, and draped in the usual sterile fashion  The flexible cystocope was used to inspect both the urethra and bladder    Findings:  Urethra: Normal without stricture  No major prostate occlusion  Bladder:  Smooth, not trabeculated and there were no stones tumors or other lesions  The orifices were orthotopic and intact  No recurrence             Specimens: None                 Complications:  None           Disposition: To home            Condition:  Stable          The following portions of the patient's history were reviewed and updated as appropriate: allergies, current medications, past family history, past medical history, past social history, past surgical history and problem list   Past Medical History:   Diagnosis Date   • Bladder cancer ([facility] )    • Cancer " "(HCC)    • DVT (deep venous thrombosis) ([facility] ) 09/2021    Right   • Hypertension      Past Surgical History:   Procedure Laterality Date   • ANKLE SURGERY Right    • CYSTOSCOPY N/A 08/30/2021    Procedure: CYSTOSCOPY;  Surgeon: Elsa Kohli MD;  [facility]: [facility];  Service: Urology   • CYSTOSCOPY     • ME CYSTO W/REMOVAL OF LESIONS SMALL N/A 09/08/2022    Procedure: TRANSURETHRAL RESECTION OF BLADDER TUMOR (TURBT) and mitomycin installation;  Surgeon: Elsa Kohli MD;  [facility]: [facility];  Service: Urology   • ME CYSTO W/REMOVAL OF LESIONS SMALL N/A 12/5/2022    Procedure: cysto bladder lesion biopsy fulguration;  Surgeon: Elsa Kohli MD;  [facility]: [facility];  Service: Urology   • ME CYSTOURETHROSCOPY WITH BIOPSY N/A 12/06/2021    Procedure: CYSTOSCOPY, TURBT right lateral wall;  Surgeon: Elsa Kohli MD;  [facility]: [facility];  Service: Urology   • TONSILLECTOMY     • TRANSURETHRAL RESECTION OF BLADDER TUMOR N/A 08/30/2021    Procedure: TRANSURETHRAL RESECTION OF BLADDER TUMOR (TURBT); Surgeon: Elsa Kohli MD;  [facility];  Service: Urology     shoulder  Review of Systems   Review of Systems   Genitourinary: Negative  Active Problem List     Patient Active Problem List   Diagnosis   • Smoking   • Bladder mass   • History of DVT (deep vein thrombosis)   • Hypertension   • Chronic anticoagulation   • Malignant neoplasm of anterior wall of bladder (HCC)   • Mild tetrahydrocannabinol (THC) abuse       Objective   /80   Pulse 79   Ht 5' 10\" (1 778 m)   Wt 91 2 kg (201 lb)   BMI 28 84 kg/m²     Physical Exam  Vitals reviewed  Constitutional:       Appearance: Normal appearance  He is normal weight  HENT:      Head: Normocephalic and atraumatic  Eyes:      Extraocular Movements: Extraocular movements intact  Pulmonary:      Effort: Pulmonary effort is normal    Genitourinary:     Penis: Normal     Musculoskeletal:         General: Normal range of motion        Cervical back: " Normal range of motion  Skin:     Coloration: Skin is not jaundiced or pale  Neurological:      General: No focal deficit present  Mental Status: He is alert and oriented to person, place, and time  Mental status is at baseline  Psychiatric:         Mood and Affect: Mood normal          Behavior: Behavior normal          Thought Content:  Thought content normal          Judgment: Judgment normal              Current Medications     Current Outpatient Medications:   •  metoprolol succinate (TOPROL-XL) 50 mg 24 hr tablet, TAKE 1 TABLET BY MOUTH EVERY DAY (Patient taking differently: 50 mg every morning), Disp: 30 tablet, Rfl: 11  •  albuterol (Ventolin HFA) 90 mcg/act inhaler, Inhale 2 puffs every 6 (six) hours as needed for wheezing (Patient not taking: Reported on 12/23/2022), Disp: 18 g, Rfl: 5  •  guaiFENesin (Mucinex) 600 mg 12 hr tablet, Take 1 tablet (600 mg total) by mouth every 12 (twelve) hours (Patient not taking: Reported on 12/23/2022), Disp: 20 tablet, Rfl: 0  •  phenazopyridine (PYRIDIUM) 200 mg tablet, Take 1 tablet (200 mg total) by mouth 3 (three) times a day as needed for bladder spasms (Patient not taking: Reported on 3/28/2023), Disp: 30 tablet, Rfl: 0  •  phenazopyridine (PYRIDIUM) 200 mg tablet, Take 1 tablet (200 mg total) by mouth 3 (three) times a day as needed for bladder spasms (Patient not taking: Reported on 12/23/2022), Disp: 30 tablet, Rfl: 0        Elsa Kohli MD

## 2023-04-28 ENCOUNTER — OFFICE VISIT (OUTPATIENT)
Dept: INTERNAL MEDICINE CLINIC | Facility: CLINIC | Age: 58
End: 2023-04-28

## 2023-04-28 VITALS
BODY MASS INDEX: 28.6 KG/M2 | HEIGHT: 70 IN | SYSTOLIC BLOOD PRESSURE: 158 MMHG | DIASTOLIC BLOOD PRESSURE: 100 MMHG | HEART RATE: 66 BPM | OXYGEN SATURATION: 99 % | WEIGHT: 199.8 LBS | TEMPERATURE: 98.5 F

## 2023-04-28 DIAGNOSIS — Z12.5 SCREENING FOR PROSTATE CANCER: ICD-10-CM

## 2023-04-28 DIAGNOSIS — J98.01 BRONCHOSPASM: ICD-10-CM

## 2023-04-28 DIAGNOSIS — Z23 ENCOUNTER FOR VACCINATION: ICD-10-CM

## 2023-04-28 DIAGNOSIS — Z12.11 SCREEN FOR COLON CANCER: ICD-10-CM

## 2023-04-28 DIAGNOSIS — Z13.29 SCREENING FOR THYROID DISORDER: ICD-10-CM

## 2023-04-28 DIAGNOSIS — C67.3 MALIGNANT NEOPLASM OF ANTERIOR WALL OF BLADDER (HCC): Primary | ICD-10-CM

## 2023-04-28 DIAGNOSIS — Z13.0 SCREENING FOR DEFICIENCY ANEMIA: ICD-10-CM

## 2023-04-28 DIAGNOSIS — Z13.1 SCREENING FOR DIABETES MELLITUS (DM): ICD-10-CM

## 2023-04-28 DIAGNOSIS — Z13.220 SCREENING FOR LIPID DISORDERS: ICD-10-CM

## 2023-04-28 DIAGNOSIS — I10 PRIMARY HYPERTENSION: ICD-10-CM

## 2023-04-28 DIAGNOSIS — F17.200 SMOKING: ICD-10-CM

## 2023-04-28 PROBLEM — Z79.01 CHRONIC ANTICOAGULATION: Status: RESOLVED | Noted: 2021-11-04 | Resolved: 2023-04-28

## 2023-04-28 RX ORDER — ALBUTEROL SULFATE 90 UG/1
2 AEROSOL, METERED RESPIRATORY (INHALATION) EVERY 6 HOURS PRN
Qty: 18 G | Refills: 5 | Status: SHIPPED | OUTPATIENT
Start: 2023-04-28

## 2023-04-28 RX ORDER — METOPROLOL SUCCINATE 50 MG/1
50 TABLET, EXTENDED RELEASE ORAL DAILY
Qty: 90 TABLET | Refills: 1 | Status: SHIPPED | OUTPATIENT
Start: 2023-04-28

## 2023-04-28 NOTE — PROGRESS NOTES
BMI Counseling: There is no height or weight on file to calculate BMI  The BMI is above normal  Nutrition recommendations include decreasing portion sizes and encouraging healthy choices of fruits and vegetables  Exercise recommendations include moderate physical activity 150 minutes/week  No pharmacotherapy was ordered  Rationale for BMI follow-up plan is due to patient being overweight or obese  Assessment/Plan:  Problem List Items Addressed This Visit        Cardiovascular and Mediastinum    Hypertension    Relevant Medications    metoprolol succinate (TOPROL-XL) 50 mg 24 hr tablet       Genitourinary    Malignant neoplasm of anterior wall of bladder (HCC) - Primary    Relevant Orders    CBC and differential    Comprehensive metabolic panel       Other    Smoking    Relevant Medications    albuterol (Ventolin HFA) 90 mcg/act inhaler   Other Visit Diagnoses     Screening for prostate cancer        Relevant Orders    PSA, Total Screen    Screening for lipid disorders        Relevant Orders    Lipid Panel with Direct LDL reflex    Screening for diabetes mellitus (DM)        Relevant Orders    Hemoglobin A1C    Screening for thyroid disorder        Relevant Orders    TSH, 3rd generation with Free T4 reflex    Screening for deficiency anemia        Encounter for vaccination        Screen for colon cancer        Bronchospasm        Relevant Medications    albuterol (Ventolin HFA) 90 mcg/act inhaler           Diagnoses and all orders for this visit:    Malignant neoplasm of anterior wall of bladder (HCC)  -     CBC and differential; Future  -     Comprehensive metabolic panel; Future  -     Cancel: Microalbumin / creatinine urine ratio    Smoking  -     albuterol (Ventolin HFA) 90 mcg/act inhaler; Inhale 2 puffs every 6 (six) hours as needed for wheezing    Screening for prostate cancer  -     PSA, Total Screen; Future    Screening for lipid disorders  -     Lipid Panel with Direct LDL reflex;  Future    Screening for diabetes mellitus (DM)  -     Hemoglobin A1C; Future    Screening for thyroid disorder  -     TSH, 3rd generation with Free T4 reflex; Future    Screening for deficiency anemia    Encounter for vaccination    Screen for colon cancer    Bronchospasm  -     albuterol (Ventolin HFA) 90 mcg/act inhaler; Inhale 2 puffs every 6 (six) hours as needed for wheezing    Primary hypertension  -     metoprolol succinate (TOPROL-XL) 50 mg 24 hr tablet; Take 1 tablet (50 mg total) by mouth daily      No problem-specific Assessment & Plan notes found for this encounter  A/P: Doing ok and will check labs   ordered CRC  Discussed BMI and will give info on diet and exercise  DIscussed vaccines and defers    BP is up and will restart meds  Continue current treatment and RTC four months for routine  Wean tobacco      Subjective:      Patient ID: Leory Sandifer is a 62 y o  male  WM RTC for f/u HTN, h/o DVT, etc  Doing ok and no new  Issues, but ran out of his meds and hasn' tbeen on them for some time  Seen  for cancer and is in remission  Still smoking  Off chronic anticoagulation and no more DVT events  Due for labs, CRC, and vaccines  The following portions of the patient's history were reviewed and updated as appropriate:   He has a past medical history of Bladder cancer ([facility] ), Cancer ([facility] ), DVT (deep venous thrombosis) ([facility] ) (09/2021), and Hypertension  ,  does not have any pertinent problems on file  ,   has a past surgical history that includes Transurethral resection of bladder tumor (N/A, 08/30/2021); CYSTOSCOPY (N/A, 08/30/2021); Ankle surgery (Right); Tonsillectomy; pr cystourethroscopy with biopsy (N/A, 12/06/2021); pr cysto w/removal of lesions small (N/A, 09/08/2022); Cystoscopy; and pr cysto w/removal of lesions small (N/A, 12/5/2022)  ,  family history is not on file  ,   reports that he has been smoking cigarettes  He has a 30 00 pack-year smoking history   He has never used smokeless tobacco  He reports "current alcohol use of about 7 0 standard drinks per week  Drug: Marijuana  ,  is allergic to penicillins     Current Outpatient Medications   Medication Sig Dispense Refill   • albuterol (Ventolin HFA) 90 mcg/act inhaler Inhale 2 puffs every 6 (six) hours as needed for wheezing 18 g 5   • metoprolol succinate (TOPROL-XL) 50 mg 24 hr tablet Take 1 tablet (50 mg total) by mouth daily 90 tablet 1     No current facility-administered medications for this visit  Review of Systems   Constitutional: Negative for activity change, chills, diaphoresis, fatigue and fever  HENT: Negative  Eyes: Negative for visual disturbance  Respiratory: Negative for cough, chest tightness, shortness of breath and wheezing  Cardiovascular: Negative for chest pain, palpitations and leg swelling  Gastrointestinal: Negative for abdominal pain, constipation, diarrhea, nausea and vomiting  Endocrine: Negative for cold intolerance and heat intolerance  Genitourinary: Negative for difficulty urinating, dysuria and frequency  Musculoskeletal: Negative for arthralgias, gait problem and myalgias  Neurological: Negative for dizziness, seizures, syncope, weakness, light-headedness and headaches  Hematological: Does not bruise/bleed easily  Psychiatric/Behavioral: Negative for confusion, dysphoric mood and sleep disturbance  The patient is not nervous/anxious  PHQ-2/9 Depression Screening          Objective:  Vitals:    04/28/23 0753   BP: 158/100   Pulse: 66   Temp: 98 5 °F (36 9 °C)   SpO2: 99%   Weight: 90 6 kg (199 lb 12 8 oz)   Height: 5' 10\" (1 778 m)     Body mass index is 28 67 kg/m²  Physical Exam  Vitals and nursing note reviewed  Constitutional:       General: He is not in acute distress  Appearance: Normal appearance  He is not ill-appearing  HENT:      Head: Normocephalic and atraumatic        Mouth/Throat:      Mouth: Mucous membranes are moist    Eyes:      Extraocular Movements: Extraocular " movements intact  Conjunctiva/sclera: Conjunctivae normal       Pupils: Pupils are equal, round, and reactive to light  Neck:      Vascular: No carotid bruit  Cardiovascular:      Rate and Rhythm: Normal rate and regular rhythm  Heart sounds: Normal heart sounds  Pulmonary:      Effort: Pulmonary effort is normal  No respiratory distress  Breath sounds: Normal breath sounds  No wheezing, rhonchi or rales  Abdominal:      General: Bowel sounds are normal  There is no distension  Palpations: Abdomen is soft  Tenderness: There is no abdominal tenderness  Musculoskeletal:      Cervical back: Neck supple  Right lower leg: No edema  Left lower leg: No edema  Neurological:      General: No focal deficit present  Mental Status: He is alert and oriented to person, place, and time  Mental status is at baseline  Psychiatric:         Mood and Affect: Mood normal          Behavior: Behavior normal          Thought Content: Thought content normal          Judgment: Judgment normal          BMI Counseling: Body mass index is 28 67 kg/m²  The BMI is above normal  Nutrition recommendations include reducing portion sizes, decreasing overall calorie intake, reducing intake of saturated fat and trans fat and reducing intake of cholesterol  Exercise recommendations include moderate aerobic physical activity for 150 minutes/week

## 2023-04-28 NOTE — PATIENT INSTRUCTIONS
Weight Management   AMBULATORY CARE:   Why it is important to manage your weight:  Being overweight increases your risk of health conditions such as heart disease, high blood pressure, type 2 diabetes, and certain types of cancer  It can also increase your risk for osteoarthritis, sleep apnea, and other respiratory problems  Aim for a slow, steady weight loss  Even a small amount of weight loss can lower your risk of health problems  Risks of being overweight:  Extra weight can cause many health problems, including the following:  • Diabetes (high blood sugar level)    • High blood pressure or high cholesterol    • Heart disease    • Stroke    • Gallbladder or liver disease    • Cancer of the colon, breast, prostate, liver, or kidney    • Sleep apnea    • Arthritis or gout    Screening  is done to check for health conditions before you have signs or symptoms  If you are 28to 79years old, your blood sugar level may be checked every 3 years for signs of prediabetes or diabetes  Your healthcare provider will check your blood pressure at each visit  High blood pressure can lead to a stroke or other problems  Your provider may check for signs of heart disease, cancer, or other health problems  How to lose weight safely:  A safe and healthy way to lose weight is to eat fewer calories and get regular exercise  • You can lose up about 1 pound a week by decreasing the number of calories you eat by 500 calories each day  You can decrease calories by eating smaller portion sizes or by cutting out high-calorie foods  Read labels to find out how many calories are in the foods you eat  • You can also burn calories with exercise such as walking, swimming, or biking  You will be more likely to keep weight off if you make these changes part of your lifestyle  Exercise at least 30 minutes per day on most days of the week   You can also fit in more physical activity by taking the stairs instead of the elevator or parking farther away from stores  Ask your healthcare provider about the best exercise plan for you  Healthy meal plan for weight management:  A healthy meal plan includes a variety of foods, contains fewer calories, and helps you stay healthy  A healthy meal plan includes the following:     • Eat whole-grain foods more often  A healthy meal plan should contain fiber  Fiber is the part of grains, fruits, and vegetables that is not broken down by your body  Whole-grain foods are healthy and provide extra fiber in your diet  Some examples of whole-grain foods are whole-wheat breads and pastas, oatmeal, brown rice, and bulgur  • Eat a variety of vegetables every day  Include dark, leafy greens such as spinach, kale, rose greens, and mustard greens  Eat yellow and orange vegetables such as carrots, sweet potatoes, and winter squash  • Eat a variety of fruits every day  Choose fresh or canned fruit (canned in its own juice or light syrup) instead of juice  Fruit juice has very little or no fiber  • Eat low-fat dairy foods  Drink fat-free (skim) milk or 1% milk  Eat fat-free yogurt and low-fat cottage cheese  Try low-fat cheeses such as mozzarella and other reduced-fat cheeses  • Choose meat and other protein foods that are low in fat  Choose beans or other legumes such as split peas or lentils  Choose fish, skinless poultry (chicken or turkey), or lean cuts of red meat (beef or pork)  Before you cook meat or poultry, cut off any visible fat  • Use less fat and oil  Try baking foods instead of frying them  Add less fat, such as margarine, sour cream, regular salad dressing and mayonnaise to foods  Eat fewer high-fat foods  Some examples of high-fat foods include french fries, doughnuts, ice cream, and cakes  • Eat fewer sweets  Limit foods and drinks that are high in sugar  This includes candy, cookies, regular soda, and sweetened drinks  Ways to decrease calories:   • Eat smaller portions  ? Use a small plate with smaller servings  ? Do not eat second helpings  ? When you eat at a restaurant, ask for a box and place half of your meal in the box before you eat  ? Share an entrée with someone else  • Replace high-calorie snacks with healthy, low-calorie snacks  ? Choose fresh fruit, vegetables, fat-free rice cakes, or air-popped popcorn instead of potato chips, nuts, or chocolate  ? Choose water or calorie-free drinks instead of soda or sweetened drinks  • Do not shop for groceries when you are hungry  You may be more likely to make unhealthy food choices  Take a grocery list of healthy foods and shop after you have eaten  • Eat regular meals  Do not skip meals  Skipping meals can lead to overeating later in the day  This can make it harder for you to lose weight  Eat a healthy snack in place of a meal if you do not have time to eat a regular meal  Talk with a dietitian to help you create a meal plan and schedule that is right for you  Other things to consider as you try to lose weight:   • Be aware of situations that may give you the urge to overeat, such as eating while watching television  Find ways to avoid these situations  For example, read a book, go for a walk, or do crafts  • Meet with a weight loss support group or friends who are also trying to lose weight  This may help you stay motivated to continue working on your weight loss goals  © Copyright [employer] 2022 Information is for End User's use only and may not be sold, redistributed or otherwise used for commercial purposes  The above information is an  only  It is not intended as medical advice for individual conditions or treatments  Talk to your doctor, nurse or pharmacist before following any medical regimen to see if it is safe and effective for you  Low Fat Diet   AMBULATORY CARE:   A low-fat diet  is an eating plan that is low in total fat, unhealthy fat, and cholesterol   You may need to follow a low-fat diet if you have trouble digesting or absorbing fat  You may also need to follow this diet if you have high cholesterol  You can also lower your cholesterol by increasing the amount of fiber in your diet  Soluble fiber is a type of fiber that helps to decrease cholesterol levels  Different types of fat in food:   • Limit unhealthy fats  A diet that is high in cholesterol, saturated fat, and trans fat may cause unhealthy cholesterol levels  Unhealthy cholesterol levels increase your risk of heart disease  ? Cholesterol:  Limit intake of cholesterol to less than 200 mg per day  Cholesterol is found in meat, eggs, and dairy  ? Saturated fat:  Limit saturated fat to less than 7% of your total daily calories  Ask your dietitian how many calories you need each day  Saturated fat is found in butter, cheese, ice cream, whole milk, and palm oil  Saturated fat is also found in meat, such as beef, pork, chicken skin, and processed meats  Processed meats include sausage, hot dogs, and bologna  ? Trans fat:  Avoid trans fat as much as possible  Trans fat is used in fried and baked foods  Foods that say trans fat free on the label may still have up to 0 5 grams of trans fat per serving  • Include healthy fats  Replace foods that are high in saturated and trans fat with foods high in healthy fats  This may help to decrease high cholesterol levels  ? Monounsaturated fats: These are found in avocados, nuts, and vegetable oils, such as olive, canola, and sunflower oil  ? Polyunsaturated fats: These can be found in vegetable oils, such as soybean or corn oil  Omega-3 fats can help to decrease the risk of heart disease  Omega-3 fats are found in fish, such as salmon, herring, trout, and tuna  Omega-3 fats can also be found in plant foods, such as walnuts, flaxseed, soybeans, and canola oil  Foods to limit or avoid:   • Grains:      ?  Snacks that are made with partially hydrogenated oils, such as chips, regular crackers, and butter-flavored popcorn    ? High-fat baked goods, such as biscuits, croissants, doughnuts, pies, cookies, and pastries    • Dairy:      ? Whole milk, 2% milk, and yogurt and ice cream made with whole milk    ? Half and half creamer, heavy cream, and whipping cream    ? Cheese, cream cheese, and sour cream    • Meats and proteins:      ? High-fat cuts of meat (T-bone steak, regular hamburger, and ribs)    ? Fried meat, poultry (turkey and chicken), and fish    ? Poultry (chicken and turkey) with skin    ? Cold cuts (salami or bologna), hot dogs, kapoor, and sausage    ? Whole eggs and egg yolks    • Vegetables and fruits with added fat:      ? Fried vegetables or vegetables in butter or high-fat sauces, such as cream or cheese sauces    ? Fried fruit or fruit served with butter or cream    • Fats:      ? Butter, stick margarine, and shortening    ? Coconut, palm oil, and palm kernel oil    Foods to include:       • Grains:      ? Whole-grain breads, cereals, pasta, and brown rice    ? Low-fat crackers and pretzels    • Vegetables and fruits:      ? Fresh, frozen, or canned vegetables (no salt or low-sodium)    ? Fresh, frozen, dried, or canned fruit (canned in light syrup or fruit juice)    ? Avocado    • Low-fat dairy products:      ? Nonfat (skim) or 1% milk    ? Nonfat or low-fat cheese, yogurt, and cottage cheese    • Meats and proteins:      ? Chicken or turkey with no skin    ? Baked or broiled fish    ? Lean beef and pork (loin, round, extra lean hamburger)    ? Beans and peas, unsalted nuts, soy products    ? Egg whites and substitutes    ? Seeds and nuts    • Fats:      ? Unsaturated oil, such as canola, olive, peanut, soybean, or sunflower oil    ? Soft or liquid margarine and vegetable oil spread    ? Low-fat salad dressing  Other ways to decrease fat:   • Read food labels before you buy foods    Choose foods that have less than 30% of calories from fat  Choose low-fat or fat-free dairy products  Remember that fat free does not mean calorie free  These foods still contain calories, and too many calories can lead to weight gain  • Trim fat from meat and avoid fried food  Trim all visible fat from meat before you cook it  Remove the skin from poultry  Do not ojeda meat, fish, or poultry  Bake, roast, boil, or broil these foods instead  Avoid fried foods  Eat a baked potato instead of Western Rebekah fries  Steam vegetables instead of sautéing them in butter  • Add less fat to foods  Use imitation kapoor bits on salads and baked potatoes instead of regular kapoor bits  Use fat-free or low-fat salad dressings instead of regular dressings  Use low-fat or nonfat butter-flavored topping instead of regular butter or margarine on popcorn and other foods  Ways to decrease fat in recipes:  Replace high-fat ingredients with low-fat or nonfat ones  This may cause baked goods to be drier than usual  You may need to use nonfat cooking spray on pans to prevent food from sticking  You also may need to change the amount of other ingredients, such as water, in the recipe  Try the following:  • Use low-fat or light margarine instead of regular margarine or shortening  • Use lean ground turkey breast or chicken, or lean ground beef (less than 5% fat) instead of hamburger  • Add 1 teaspoon of canola oil to 8 ounces of skim milk instead of using cream or half and half  • Use grated zucchini, carrots, or apples in breads instead of coconut  • Use blenderized, low-fat cottage cheese, plain tofu, or low-fat ricotta cheese instead of cream cheese  • Use 1 egg white and 1 teaspoon of canola oil, or use ¼ cup (2 ounces) of fat-free egg substitute instead of a whole egg  • Replace half of the oil that is called for in a recipe with applesauce when you bake  Use 3 tablespoons of cocoa powder and 1 tablespoon of canola oil instead of a square of baking chocolate      How to increase fiber:  Eat enough high-fiber foods to get 20 to 30 grams of fiber every day  Slowly increase your fiber intake to avoid stomach cramps, gas, and other problems  • Eat 3 ounces of whole-grain foods each day  An ounce is about 1 slice of bread  Eat whole-grain breads, such as whole-wheat bread  Whole wheat, whole-wheat flour, or other whole grains should be listed as the first ingredient on the food label  Replace white flour with whole-grain flour or use half of each in recipes  Whole-grain flour is heavier than white flour, so you may have to add more yeast or baking powder  • Eat a high-fiber cereal for breakfast   Oatmeal is a good source of soluble fiber  Look for cereals that have bran or fiber in the name  Choose whole-grain products, such as brown rice, barley, and whole-wheat pasta  • Eat more beans, peas, and lentils  For example, add beans to soups or salads  Eat at least 5 cups of fruits and vegetables each day  Eat fruits and vegetables with the peel because the peel is high in fiber  © Copyright [facility] 2022 Information is for End User's use only and may not be sold, redistributed or otherwise used for commercial purposes  The above information is an  only  It is not intended as medical advice for individual conditions or treatments  Talk to your doctor, nurse or pharmacist before following any medical regimen to see if it is safe and effective for you  Heart Healthy Diet   AMBULATORY CARE:   A heart healthy diet  is an eating plan low in unhealthy fats and sodium (salt)  The plan is high in healthy fats and fiber  A heart healthy diet helps improve your cholesterol levels and lowers your risk for heart disease and stroke  A dietitian will teach you how to read and understand food labels  Heart healthy diet guidelines to follow:   • Choose foods that contain healthy fats:      ? Unsaturated fats  include monounsaturated and polyunsaturated fats  Unsaturated fat is found in foods such as soybean, canola, olive, corn, and safflower oils  It is also found in soft tub margarine that is made with liquid vegetable oil  ? Omega-3 fat  is found in certain fish, such as salmon, tuna, and trout, and in walnuts and flaxseed  Eat fish high in omega-3 fats at least 2 times a week  • Limit or do not have unhealthy fats:      ? Cholesterol  is found in animal foods, such as eggs and lobster, and in dairy products made from whole milk  Limit cholesterol to less than 200 mg each day  ? Saturated fat  is found in meats, such as kapoor and hamburger  It is also found in chicken or turkey skin, whole milk, and butter  Limit saturated fat to less than 7% of your total daily calories  ? Trans fat  is found in packaged foods, such as potato chips and cookies  It is also in hard margarine, some fried foods, and shortening  Do not eat foods that contain trans fats  • Get 20 to 30 grams of fiber each day  Fruits, vegetables, whole-grain foods, and legumes (cooked beans) are good sources of fiber  • Limit sodium as directed  You may be told to limit sodium, such as to 2,000 mg or less each day  Choose low-sodium or no-salt-added foods  Add little or no salt to food you prepare  Use herbs and spices in place of salt  Include the following in your heart healthy plan:  Ask your dietitian or healthcare provider how many servings to have each day from the following food groups:  • Grains:      ? Whole-wheat breads, cereals, and pastas, and brown rice    ? Low-fat, low-sodium crackers and chips    • Vegetables:      ? Broccoli, green beans, green peas, and spinach    ? Collards, kale, and lima beans    ? Carrots, sweet potatoes, tomatoes, and peppers    ? Canned vegetables with no salt added    • Fruits:      ? Bananas, peaches, pears, and pineapple    ? Grapes, raisins, and dates    ?  Oranges, tangerines, grapefruit, orange juice, and grapefruit juice    ? Apricots, mangoes, melons, and papaya    ? Raspberries and strawberries    ? Canned fruit with no added sugar    • Low-fat dairy:      ? Nonfat (skim) milk, 1% milk, and low-fat almond, cashew, or soy milks fortified with calcium    ? Low-fat cheese, regular or frozen yogurt, and cottage cheese    • Meats and proteins:      ? Lean cuts of beef and pork (loin, leg, round), skinless chicken and turkey    ? Legumes, soy products, egg whites, or nuts    Limit or do not include the following in your heart healthy plan:   • Foods and liquids that contain unhealthy fats and oils:      ? Whole or 2% milk, cream cheese, sour cream, or cheese    ? High-fat cuts of beef (T-bone steaks, ribs), chicken or turkey with skin, and organ meats such as liver    ? Butter, stick margarine, shortening, and cooking oils such as coconut or palm oil    • Foods and liquids high in sodium:      ? Packaged foods, such as frozen dinners, cookies, macaroni and cheese, and cereals with more than 300 mg of sodium per serving    ? Vegetables with added sodium, such as instant potatoes, vegetables with added sauces, or regular canned vegetables    ? Cured or smoked meats, such as hot dogs, kapoor, and sausage    ? High-sodium ketchup, barbecue sauce, salad dressing, pickles, olives, soy sauce, or miso    • Foods and liquids high in sugar:      ? Candy, cake, cookies, pies, or doughnuts    ? Soft drinks (soda), sports drinks, or sweetened tea    ? Canned or dry mixes for cakes, soups, sauces, or gravies    Other healthy heart guidelines:   • Do not smoke  Nicotine and other chemicals in cigarettes and cigars can cause lung and heart damage  Ask your healthcare provider for information if you currently smoke and need help to quit  E-cigarettes or smokeless tobacco still contain nicotine  Talk to your provider before you use these products  • Limit or do not drink alcohol as directed    Alcohol can damage your heart and raise your blood pressure  Your healthcare provider may give you specific daily and weekly limits  The general recommended limit is 1 drink a day for women 21 or older and for men 72 or older  Do not have more than 3 drinks within 24 hours or 7 within a week  The recommended limit is 2 drinks a day for men 24to 59years of age  Do not have more than 4 drinks within 24 hours or 14 within a week  A drink of alcohol is 12 ounces of beer, 5 ounces of wine, or 1½ ounces of liquor  • Maintain a healthy weight  Extra body weight makes your heart work harder  Ask your provider what a healthy weight is for you  He or she can help you create a safe weight loss plan, if needed  • Exercise regularly  Exercise can help you maintain a healthy weight and improve your blood pressure and cholesterol levels  Regular exercise can also decrease your risk for heart problems  Ask your provider about the best exercise plan for you  Do not start an exercise program without asking your provider  Follow up with your doctor or cardiologist as directed:  Write down your questions so you remember to ask them during your visits  © Copyright [facility] 2022 Information is for End User's use only and may not be sold, redistributed or otherwise used for commercial purposes  The above information is an  only  It is not intended as medical advice for individual conditions or treatments  Talk to your doctor, nurse or pharmacist before following any medical regimen to see if it is safe and effective for you

## 2023-06-06 ENCOUNTER — VBI (OUTPATIENT)
Dept: ADMINISTRATIVE | Facility: OTHER | Age: 58
End: 2023-06-06

## 2023-06-27 ENCOUNTER — PROCEDURE VISIT (OUTPATIENT)
Dept: UROLOGY | Facility: CLINIC | Age: 58
End: 2023-06-27
Payer: COMMERCIAL

## 2023-06-27 VITALS
WEIGHT: 193.6 LBS | HEIGHT: 70 IN | SYSTOLIC BLOOD PRESSURE: 132 MMHG | DIASTOLIC BLOOD PRESSURE: 82 MMHG | BODY MASS INDEX: 27.72 KG/M2

## 2023-06-27 DIAGNOSIS — C67.9 MALIGNANT NEOPLASM OF URINARY BLADDER, UNSPECIFIED SITE (HCC): ICD-10-CM

## 2023-06-27 DIAGNOSIS — Z12.5 PROSTATE CANCER SCREENING: ICD-10-CM

## 2023-06-27 DIAGNOSIS — C67.9 MALIGNANT NEOPLASM OF URINARY BLADDER, UNSPECIFIED SITE (HCC): Primary | ICD-10-CM

## 2023-06-27 PROCEDURE — 52000 CYSTOURETHROSCOPY: CPT | Performed by: UROLOGY

## 2023-06-27 PROCEDURE — 99213 OFFICE O/P EST LOW 20 MIN: CPT | Performed by: UROLOGY

## 2023-06-27 PROCEDURE — 81002 URINALYSIS NONAUTO W/O SCOPE: CPT | Performed by: UROLOGY

## 2023-06-27 RX ORDER — DOXYCYCLINE 100 MG/1
100 CAPSULE ORAL 2 TIMES DAILY
COMMUNITY
Start: 2023-06-20 | End: 2023-07-11

## 2023-06-27 RX ORDER — CIPROFLOXACIN 500 MG/1
500 TABLET, FILM COATED ORAL ONCE
Qty: 1 TABLET | Refills: 0 | Status: SHIPPED | OUTPATIENT
Start: 2023-06-27 | End: 2023-06-27

## 2023-06-27 RX ORDER — CIPROFLOXACIN 500 MG/1
500 TABLET, FILM COATED ORAL ONCE
Qty: 1 TABLET | Refills: 0 | Status: SHIPPED | OUTPATIENT
Start: 2023-06-27 | End: 2023-06-27 | Stop reason: SDUPTHER

## 2023-06-27 NOTE — PROGRESS NOTES
"[address] [facility]  [facility]  [address] [facility] [place], [address] [facility]  [phone]  www  [facility]  org      NAME: Solomon Glez  AGE: 62 y o  SEX: male  : 1965   MRN: [id number]    DATE: 2023  TIME: 9:22 AM    Assessment and Plan:    Bladder cancer with tiny papillary recurrence 2 to 3 mm just lateral to the right ureteral orifice-I offered fulguration of this but he declined  We will recheck this in 3 months  Prostate cancer screening: Encourage patient to have his PSA done  I told him that it is possible to have prostate cancer in the setting so it is better not to wait  Chief Complaint   No chief complaint on file  History of Present Illness   Bladder cancer follow-up: 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 status post TURBT by me 2021, had a relook 2021 that showed a small papillary recurrence then found 2022 to have recurrence with 1 cm tumor left posterior wall and 1 cm tumor right dome underwent transurethral resection of these 2022 which turned out to be noninvasive low-grade papillary urothelial carcinoma  Muscle was present and was uninvolved  He had mitomycin instilled  He then underwent TURBT for a right lateral wall lesion 2022 by me which showed urothelial dysplasia with involvement of cystitis cystica and no cancer  He has never had BCG  After the mitomycin in September with a TURBT he had severe bladder pain with bladder spasms with referral to the urethra  He referred to it as [place] nightmare\"  He cannot receive mitomycin anymore due to the inflammatory effects  This is also the reason why no BCG  Here for surveillance cystoscopy    CT scan abdomen and pelvis 2022 showed an irregular enhancing partially circumferential bladder wall thickening with nodular margins anterior wall and dome but continuing " "along the anterior right bladder wall  Kidneys were okay  No sign metastases  Of course that area on the bladder was followed up with the TURBT of benign lesions in December  Voiding \"good\"  Last surveillance cystoscopy was March 28, 2023 which showed no major prostate occlusion and there was no recurrence  Prostate cancer screening: PSA has been ordered twice but not done for some reason  Last PSA was 2 6 November 4, 2021  He currently is on a 21-day course of doxycycline for possible Lyme disease, although his Lyme titer came back negative  He was having hot flashes with sweating, joint aches and general malaise  Cystoscopy     Date/Time 6/27/2023 9:00 AM     Performed by  Paty Grant MD   Authorized by Paty Grant MD     Universal Protocol:  Consent: Verbal consent obtained  Written consent obtained  Procedure Details:  Procedure type: cystoscopy    Additional Procedure Details: Cystoscopy Procedure Note        Pre-operative Diagnosis: Bladder cancer surveillance    Post-operative Diagnosis: Same, tiny papillary recurrence right lateral trigone, 2 to 3 mm    Procedure: Flexible cystoscopy    Surgeon: Dee Dee Elizalde MD    Anesthesia: 1% Xylocaine per urethra    EBL: Minimal    Complications: none    Procedure Details   The risks, benefits, complications, treatment options, and expected outcomes were discussed with the patient  The patient concurred with the proposed plan, giving informed consent  Cystoscopy was performed today under local anesthesia, using sterile technique  The patient was placed in the supine position, prepped with Betadine, and draped in the usual sterile fashion  The flexible cystocope was used to inspect both the urethra and bladder    Findings:  Urethra: Normal without stricture  Prostate mostly nonocclusive  Bladder:  Smooth, not trabeculated and there was a 2 to 3 mm tiny papillary recurrence just lateral to the right ureteral orifice    The orifices were " orthotopic and intact  Specimens: none                 Complications:  None           Disposition: To home            Condition:  Stable             The following portions of the patient's history were reviewed and updated as appropriate: allergies, current medications, past family history, past medical history, past social history, past surgical history and problem list   Past Medical History:   Diagnosis Date   • Bladder cancer ([facility] [address] )    • Cancer ([facility] )    • DVT (deep venous thrombosis) ([facility] ) 09/2021    Right   • Hypertension      Past Surgical History:   Procedure Laterality Date   • ANKLE SURGERY Right    • CYSTOSCOPY N/A 08/30/2021    Procedure: CYSTOSCOPY;  Surgeon: Miller Alba MD;  Location: [facility];  Service: Urology   • CYSTOSCOPY     • FL CYSTO W/REMOVAL OF LESIONS SMALL N/A 09/08/2022    Procedure: TRANSURETHRAL RESECTION OF BLADDER TUMOR (TURBT) and mitomycin installation;  Surgeon: Miller Alba MD;  Location: [facility];  Service: Urology   • FL CYSTO W/REMOVAL OF LESIONS SMALL N/A 12/5/2022    Procedure: cysto bladder lesion biopsy fulguration;  Surgeon: Miller Alba MD;  [facility]: [facility];  Service: Urology   • FL CYSTOURETHROSCOPY WITH BIOPSY N/A 12/06/2021    Procedure: CYSTOSCOPY, TURBT right lateral wall;  Surgeon: Miller Alba MD;  Location: [facility];  Service: Urology   • TONSILLECTOMY     • TRANSURETHRAL RESECTION OF BLADDER TUMOR N/A 08/30/2021    Procedure: TRANSURETHRAL RESECTION OF BLADDER TUMOR (TURBT); Surgeon: Miller Alba MD;  [facility]: [facility];  Service: Urology     shoulder  Review of Systems   Review of Systems   Genitourinary: Negative          Active Problem List     Patient Active Problem List   Diagnosis   • Smoking   • Bladder mass   • History of DVT (deep vein thrombosis)   • Hypertension   • Malignant neoplasm of anterior wall of bladder (HCC)   • Mild tetrahydrocannabinol (THC) abuse       Objective   /82 (BP Location: Left arm, Patient Position: "Sitting, Cuff Size: Adult)   Ht 5' 10\" (1 778 m)   Wt 87 8 kg (193 lb 9 6 oz)   BMI 27 78 kg/m²     Physical Exam  Vitals reviewed  Constitutional:       Appearance: Normal appearance  HENT:      Head: Normocephalic and atraumatic  Eyes:      Extraocular Movements: Extraocular movements intact  Pulmonary:      Effort: Pulmonary effort is normal    Genitourinary:     Penis: Normal        Testes: Normal    Musculoskeletal:         General: Normal range of motion  Cervical back: Normal range of motion  Skin:     Coloration: Skin is not jaundiced or pale  Neurological:      General: No focal deficit present  Mental Status: He is alert and oriented to person, place, and time  Mental status is at baseline  Psychiatric:         Mood and Affect: Mood normal          Behavior: Behavior normal          Thought Content:  Thought content normal          Judgment: Judgment normal              Current Medications     Current Outpatient Medications:   •  ciprofloxacin (Cipro) 500 mg tablet, Take 1 tablet (500 mg total) by mouth once for 1 dose Take 1 dose after  procedure, Disp: 1 tablet, Rfl: 0  •  doxycycline monohydrate (MONODOX) 100 mg capsule, Take 100 mg by mouth 2 (two) times a day, Disp: , Rfl:   •  metoprolol succinate (TOPROL-XL) 50 mg 24 hr tablet, Take 1 tablet (50 mg total) by mouth daily, Disp: 90 tablet, Rfl: 1  •  albuterol (Ventolin HFA) 90 mcg/act inhaler, Inhale 2 puffs every 6 (six) hours as needed for wheezing (Patient not taking: Reported on 6/27/2023), Disp: 18 g, Rfl: 5        Yulissa Wright MD        "

## 2023-06-27 NOTE — LETTER
2023     [facility] [address] [facility]    Patient: Collins Ramírez   YOB: 1965   Date of Visit: 2023       Dear Dr Ceferino Ramos: Thank you for referring Collins Ramírez to me for evaluation  Below are my notes for this consultation  If you have questions, please do not hesitate to call me  I look forward to following your patient along with you  Sincerely,        Amanda Wyatt MD        CC: No Recipients    Amanda Wyatt MD  2023  9:23 AM  Sign when Signing Visit  [address] [facility]  [address], [address] [place] [phone]  www  [facility]  org      NAME: Collins Ramírez  AGE: 62 y o  SEX: male  : 1965   MRN: [id number]    DATE: 2023  TIME: 9:22 AM    Assessment and Plan:    Bladder cancer with tiny papillary recurrence 2 to 3 mm just lateral to the right ureteral orifice-I offered fulguration of this but he declined  We will recheck this in 3 months  Prostate cancer screening: Encourage patient to have his PSA done  I told him that it is possible to have prostate cancer in the setting so it is better not to wait  Chief Complaint   No chief complaint on file  History of Present Illness   Bladder cancer follow-up: 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 status post TURBT by me 2021, had a relook 2021 that showed a small papillary recurrence then found 2022 to have recurrence with 1 cm tumor left posterior wall and 1 cm tumor right dome underwent transurethral resection of these 2022 which turned out to be noninvasive low-grade papillary urothelial carcinoma  Muscle was present and was uninvolved  He had mitomycin instilled    He then underwent TURBT for a right lateral wall lesion 2022 by me which showed urothelial dysplasia with involvement of "cystitis cystica and no cancer  He has never had BCG  After the mitomycin in September with a TURBT he had severe bladder pain with bladder spasms with referral to the urethra  He referred to it as [place] nightmare\"  He cannot receive mitomycin anymore due to the inflammatory effects  This is also the reason why no BCG  Here for surveillance cystoscopy  CT scan abdomen and pelvis November 8, 2022 showed an irregular enhancing partially circumferential bladder wall thickening with nodular margins anterior wall and dome but continuing along the anterior right bladder wall  Kidneys were okay  No sign metastases  Of course that area on the bladder was followed up with the TURBT of benign lesions in December  Voiding \"good\"  Last surveillance cystoscopy was March 28, 2023 which showed no major prostate occlusion and there was no recurrence  Prostate cancer screening: PSA has been ordered twice but not done for some reason  Last PSA was 2 6 November 4, 2021  He currently is on a 21-day course of doxycycline for possible Lyme disease, although his Lyme titer came back negative  He was having hot flashes with sweating, joint aches and general malaise  Cystoscopy     Date/Time 6/27/2023 9:00 AM     Performed by  Yulissa Wright MD   Authorized by Yulissa Wright MD     Universal Protocol:  Consent: Verbal consent obtained  Written consent obtained  Procedure Details:  Procedure type: cystoscopy    Additional Procedure Details: Cystoscopy Procedure Note        Pre-operative Diagnosis: Bladder cancer surveillance    Post-operative Diagnosis: Same, tiny papillary recurrence right lateral trigone, 2 to 3 mm    Procedure: Flexible cystoscopy    Surgeon: Evangelista Edwards MD    Anesthesia: 1% Xylocaine per urethra    EBL: Minimal    Complications: none    Procedure Details   The risks, benefits, complications, treatment options, and expected outcomes were discussed with the patient   The patient concurred with " the proposed plan, giving informed consent  Cystoscopy was performed today under local anesthesia, using sterile technique  The patient was placed in the supine position, prepped with Betadine, and draped in the usual sterile fashion  The flexible cystocope was used to inspect both the urethra and bladder    Findings:  Urethra: Normal without stricture  Prostate mostly nonocclusive  Bladder:  Smooth, not trabeculated and there was a 2 to 3 mm tiny papillary recurrence just lateral to the right ureteral orifice  The orifices were orthotopic and intact             Specimens: none                 Complications:  None           Disposition: To home            Condition:  Stable             The following portions of the patient's history were reviewed and updated as appropriate: allergies, current medications, past family history, past medical history, past social history, past surgical history and problem list   Past Medical History:   Diagnosis Date   • Bladder cancer ([facility] [address] )    • Cancer ([facility] )    • DVT (deep venous thrombosis) ([facility] ) 09/2021    Right   • Hypertension      Past Surgical History:   Procedure Laterality Date   • ANKLE SURGERY Right    • CYSTOSCOPY N/A 08/30/2021    Procedure: CYSTOSCOPY;  Surgeon: Jose Alfredo Powers MD;  Location:  MAIN OR;  Service: Urology   • CYSTOSCOPY     • FL CYSTO W/REMOVAL OF LESIONS SMALL N/A 09/08/2022    Procedure: TRANSURETHRAL RESECTION OF BLADDER TUMOR (TURBT) and mitomycin installation;  Surgeon: Jose Alfredo Powers MD;  [facility];  Service: Urology   • FL CYSTO W/REMOVAL OF LESIONS SMALL N/A 12/5/2022    Procedure: cysto bladder lesion biopsy fulguration;  Surgeon: Jose Alfredo Powers MD;  [facility]: [facility];  Service: Urology   • FL CYSTOURETHROSCOPY WITH BIOPSY N/A 12/06/2021    Procedure: CYSTOSCOPY, TURBT right lateral wall;  Surgeon: Jose Alfredo Powers MD;  Location: [facility];  Service: Urology   • TONSILLECTOMY     • TRANSURETHRAL RESECTION OF BLADDER TUMOR N/A 08/30/2021 "   Procedure: TRANSURETHRAL RESECTION OF BLADDER TUMOR (TURBT); Surgeon: Mattie Hernandez MD;  Location: [facility];  Service: Urology     shoulder  Review of Systems   Review of Systems   Genitourinary: Negative  Active Problem List     Patient Active Problem List   Diagnosis   • Smoking   • Bladder mass   • History of DVT (deep vein thrombosis)   • Hypertension   • Malignant neoplasm of anterior wall of bladder (HCC)   • Mild tetrahydrocannabinol (THC) abuse       Objective   /82 (BP Location: Left arm, Patient Position: Sitting, Cuff Size: Adult)   Ht 5' 10\" (1 778 m)   Wt 87 8 kg (193 lb 9 6 oz)   BMI 27 78 kg/m²     Physical Exam  Vitals reviewed  Constitutional:       Appearance: Normal appearance  HENT:      Head: Normocephalic and atraumatic  Eyes:      Extraocular Movements: Extraocular movements intact  Pulmonary:      Effort: Pulmonary effort is normal    Genitourinary:     Penis: Normal        Testes: Normal    Musculoskeletal:         General: Normal range of motion  Cervical back: Normal range of motion  Skin:     Coloration: Skin is not jaundiced or pale  Neurological:      General: No focal deficit present  Mental Status: He is alert and oriented to person, place, and time  Mental status is at baseline  Psychiatric:         Mood and Affect: Mood normal          Behavior: Behavior normal          Thought Content:  Thought content normal          Judgment: Judgment normal              Current Medications     Current Outpatient Medications:   •  ciprofloxacin (Cipro) 500 mg tablet, Take 1 tablet (500 mg total) by mouth once for 1 dose Take 1 dose after  procedure, Disp: 1 tablet, Rfl: 0  •  doxycycline monohydrate (MONODOX) 100 mg capsule, Take 100 mg by mouth 2 (two) times a day, Disp: , Rfl:   •  metoprolol succinate (TOPROL-XL) 50 mg 24 hr tablet, Take 1 tablet (50 mg total) by mouth daily, Disp: 90 tablet, Rfl: 1  •  albuterol (Ventolin HFA) 90 mcg/act inhaler, " Inhale 2 puffs every 6 (six) hours as needed for wheezing (Patient not taking: Reported on 6/27/2023), Disp: 18 g, Rfl: 5        Jocelyne Jackson MD

## 2023-06-27 NOTE — PATIENT INSTRUCTIONS
You have just undergone cystoscopy of the bladder  Expect to see some blood in the urine, which should clear up within 24 to 48 hours  You also may experience burning urgency and frequency of urination which is normal   Call for fever greater than 101 5 degrees, severe pain not relieved by over-the-counter medicines, or inability to urinate  You may resume all normal activities  For irritative symptoms, you can purchase over-the-counter remedies such as cystek or Azo, or any other preparations advertised in the pharmacy for bladder symptoms  I will call in Pyridium which is a prescription strength medication for bladder irritability upon your request     Please have PSA blood test done and I will send you the results  This is necessary to screen for prostate cancer

## 2023-08-03 ENCOUNTER — TELEPHONE (OUTPATIENT)
Dept: UROLOGY | Facility: MEDICAL CENTER | Age: 58
End: 2023-08-03

## 2023-08-03 NOTE — TELEPHONE ENCOUNTER
Patient of Dr. Noah Balderas at [facility]    Patient called stating he saw Dr. Noah Balderas and he does have an appointment for a repeat cysto in September and he stated Dr. Noah Balderas stated about burning out the small cyst.  He wants to know what it involves if he has it done at the time of his cysto. He has a lot of questions prior to it. Best phone number to call would be his wife phone number.   He is not able to answer the phone at work and he works 9 to 5 pm.    Phone number to call is [phone]

## 2023-08-09 NOTE — TELEPHONE ENCOUNTER
pt calling and wants to know the process of the office biospy. I did explain that it was a quick procedure. It is all done thought the scope. He takes a sample of the tissue of the bladder and the cauterized the bladder once the tissue is removed.       Please call pt back with additional information at [phone]

## 2023-08-10 NOTE — TELEPHONE ENCOUNTER
Contacted and spoke with the patient who is asking about the cysto with possible bladder biopsy/fulguration. Explained to Tito Smyth his next appointment is scheduled as a cysto and it can be done at the same time if patient agrees. The instruments used for biopsy/fulguration are passed through the scope. Patient concerned he was going to have a catheter inserted. All questions answered.

## 2023-09-05 ENCOUNTER — PROCEDURE VISIT (OUTPATIENT)
Dept: UROLOGY | Facility: CLINIC | Age: 58
End: 2023-09-05
Payer: COMMERCIAL

## 2023-09-05 VITALS
DIASTOLIC BLOOD PRESSURE: 80 MMHG | HEIGHT: 70 IN | WEIGHT: 193 LBS | BODY MASS INDEX: 27.63 KG/M2 | HEART RATE: 60 BPM | SYSTOLIC BLOOD PRESSURE: 130 MMHG

## 2023-09-05 DIAGNOSIS — C67.9 MALIGNANT NEOPLASM OF URINARY BLADDER, UNSPECIFIED SITE (HCC): Primary | ICD-10-CM

## 2023-09-05 DIAGNOSIS — Z12.5 PROSTATE CANCER SCREENING: ICD-10-CM

## 2023-09-05 LAB
SL AMB  POCT GLUCOSE, UA: NORMAL
SL AMB LEUKOCYTE ESTERASE,UA: NORMAL
SL AMB POCT BILIRUBIN,UA: NORMAL
SL AMB POCT BLOOD,UA: NORMAL
SL AMB POCT CLARITY,UA: CLEAR
SL AMB POCT COLOR,UA: YELLOW
SL AMB POCT KETONES,UA: NORMAL
SL AMB POCT NITRITE,UA: NORMAL
SL AMB POCT PH,UA: 5
SL AMB POCT SPECIFIC GRAVITY,UA: 1.01
SL AMB POCT URINE PROTEIN: NORMAL
SL AMB POCT UROBILINOGEN: 0.2

## 2023-09-05 PROCEDURE — 99213 OFFICE O/P EST LOW 20 MIN: CPT | Performed by: UROLOGY

## 2023-09-05 PROCEDURE — 52214 CYSTOSCOPY AND TREATMENT: CPT | Performed by: UROLOGY

## 2023-09-05 PROCEDURE — 81002 URINALYSIS NONAUTO W/O SCOPE: CPT | Performed by: UROLOGY

## 2023-09-05 RX ORDER — CIPROFLOXACIN 500 MG/1
500 TABLET, FILM COATED ORAL ONCE
Qty: 1 TABLET | Refills: 0 | Status: SHIPPED | OUTPATIENT
Start: 2023-09-05 | End: 2023-09-05

## 2023-09-05 NOTE — PATIENT INSTRUCTIONS
You have just undergone cystoscopy of the bladder. Expect to see some blood in the urine, which should clear up within 24 to 48 hours. You also may experience burning urgency and frequency of urination which is normal.  Call for fever greater than 101.5 degrees, severe pain not relieved by over-the-counter medicines, or inability to urinate. You may resume all normal activities. For irritative symptoms, you can purchase over-the-counter remedies such as cystek or Azo, or any other preparations advertised in the pharmacy for bladder symptoms.   I will call in Pyridium which is a prescription strength medication for bladder irritability upon your request.

## 2023-09-05 NOTE — LETTER
2023     Lyssa Jasso DO  [id number] [address] [address]    Patient: Juan Marcus   YOB: 1965   Date of Visit: 2023       Dear Dr. Stacey Fang: Thank you for referring Juan Marcus to me for evaluation. Below are my notes for this consultation. If you have questions, please do not hesitate to call me. I look forward to following your patient along with you. Sincerely,        Amilcar Cardozo MD        CC: No Recipients    Amilcar Cardozo MD  2023 10:00 AM  Sign when Signing Visit  [address] [facility]  [address] [place], [address]  [phone]  [email or web address]      NAME: Juan Marcus  AGE: 62 y.o. SEX: male  : 1965   MRN: [id number]    DATE: 2023  TIME: 9:42 AM    Assessment and Plan:    Bladder cancer with tiny recurrences as below-some of these may be premalignant, 3 lesions that were fulgurated completely. He has a very sensitive bladder and cannot tolerate intravesical therapy. He wishes to have repeat cystoscopy in 2024. He also wishes to get the PSA checked at that time. Follow-up for cystoscopy in 2024. Chief Complaint   No chief complaint on file. 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 Muscle was present and was uninvolved. He had mitomycin instilled.   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 He has never had BCG. After the mitomycin in September with a TURBT he had severe bladder pain with bladder spasms with referral to the urethra. He referred to it as "a nightmare". He cannot receive mitomycin anymore due to the inflammatory effects. This is also the reason why no BCG. Here for surveillance cystoscopy. CT scan abdomen and pelvis November 8, 2022 showed an irregular enhancing partially circumferential bladder wall thickening with nodular margins anterior wall and dome but continuing along the anterior right bladder wall. Kidneys were okay. No sign metastases. Of course that area on the bladder was followed up with the TURBT of benign lesions in December. Here for surveillance cystoscopy. Last cystoscopy was June 27, 2023 and I saw a tiny papillary recurrence 2 to 3 mm just lateral to the right ureteral orifice. I offered fulguration of this but he declined. He is here for 3-month recheck of this. No gross hematuria, but has more frequency since the last procedure. Otherwise doing well in terms of his general health. Prostate cancer screening: Again PSA has been ordered numerous times but has not been done. He has been counseled regarding this. Cystoscopy     Date/Time 9/5/2023 9:00 AM     Performed by  Linnie Scheuermann, MD   Authorized by Linnie Scheuermann, MD     Universal Protocol:  Consent: Verbal consent obtained. Written consent obtained. Procedure Details:  Procedure type: fulguration    Additional Procedure Details: Cystoscopy Procedure Note        Pre-operative Diagnosis: Bladder cancer surveillance    Post-operative Diagnosis: Same, with fulguration of tiny bladder tumors.     Procedure: Flexible cystoscopy    Surgeon: Julianne Michael MD    Anesthesia: 1% Xylocaine per urethra    EBL: Minimal    Complications: none    Procedure Details   The risks, benefits, complications, treatment options, and expected outcomes were discussed with the patient. The patient concurred with the proposed plan, giving informed consent. Cystoscopy was performed today under local anesthesia, using sterile technique. The patient was placed in the supine position, prepped with Betadine, and draped in the usual sterile fashion. The flexible cystocope was used to inspect both the urethra and bladder    Findings:  Urethra: Normal without stricture. Prostate only minimally occlusive. Bladder:  Smooth, not trabeculated and there were 3 very tiny recurrences, actually looking mostly premalignant right lateral wall and right lateral trigone-he agreed to fulguration of these so I took the Bugbee electrode and fulgurated them flat and deep. No biopsies taken. .  The orifices were orthotopic and intact.            Specimens: none                 Complications:  None           Disposition: To home            Condition:  Stable          The following portions of the patient's history were reviewed and updated as appropriate: allergies, current medications, past family history, past medical history, past social history, past surgical history and problem list.  Past Medical History:   Diagnosis Date   • Bladder cancer ([address])    • Cancer ([address])    • DVT (deep venous thrombosis) ([address]) 09/2021    Right   • Hypertension      Past Surgical History:   Procedure Laterality Date   • ANKLE SURGERY Right    • CYSTOSCOPY N/A 08/30/2021    Procedure: CYSTOSCOPY;  Surgeon: Rangel Hardwick MD;  Location:  MAIN OR;  Service: Urology   • CYSTOSCOPY     • IN CYSTO W/REMOVAL OF LESIONS SMALL N/A 09/08/2022    Procedure: TRANSURETHRAL RESECTION OF BLADDER TUMOR (TURBT) and mitomycin installation;  Surgeon: Rangel Hardwick MD;  Location: [facility];  Service: Urology   • IN CYSTO W/REMOVAL OF LESIONS SMALL N/A 12/5/2022    Procedure: cysto bladder lesion biopsy fulguration;  Surgeon: Rangel Hardwick MD;  Location: [facility];  Service: Urology   • IN CYSTOURETHROSCOPY WITH BIOPSY N/A 12/06/2021    Procedure: CYSTOSCOPY, TURBT right lateral wall;  Surgeon: Isidra Guzman MD;  Location: [facility];  Service: Urology   • TONSILLECTOMY     • TRANSURETHRAL RESECTION OF BLADDER TUMOR N/A 08/30/2021    Procedure: TRANSURETHRAL RESECTION OF BLADDER TUMOR (TURBT); Surgeon: Isidra Guzman MD;  Location: [facility];  Service: Urology     shoulder  Review of Systems   Review of Systems   Genitourinary:        As per HPI       Active Problem List     Patient Active Problem List   Diagnosis   • Smoking   • Bladder mass   • History of DVT (deep vein thrombosis)   • Hypertension   • Malignant neoplasm of anterior wall of bladder (HCC)   • Mild tetrahydrocannabinol (THC) abuse       Objective   /80   Pulse 60   Ht 5' 10" (1.778 m)   Wt 87.5 kg (193 lb)   BMI 27.69 kg/m²     Physical Exam  Vitals reviewed. Constitutional:       Appearance: Normal appearance. HENT:      Head: Normocephalic and atraumatic. Eyes:      Extraocular Movements: Extraocular movements intact. Pulmonary:      Effort: Pulmonary effort is normal.   Genitourinary:     Penis: Normal.    Musculoskeletal:         General: Normal range of motion. Cervical back: Normal range of motion. Skin:     Coloration: Skin is not jaundiced or pale. Neurological:      General: No focal deficit present. Mental Status: He is alert and oriented to person, place, and time. Mental status is at baseline. Psychiatric:         Mood and Affect: Mood normal.         Behavior: Behavior normal.         Thought Content:  Thought content normal.         Judgment: Judgment normal.             Current Medications     Current Outpatient Medications:   •  metoprolol succinate (TOPROL-XL) 50 mg 24 hr tablet, Take 1 tablet (50 mg total) by mouth daily, Disp: 90 tablet, Rfl: 1  •  albuterol (Ventolin HFA) 90 mcg/act inhaler, Inhale 2 puffs every 6 (six) hours as needed for wheezing (Patient not taking: Reported on 6/27/2023), Disp: 18 g, Rfl: 5        Isidra Guzman MD

## 2023-09-05 NOTE — PROGRESS NOTES
[address] [facility]  [facility]  [address] [place] [address]  [phone]  [email or web address]      NAME: Joo Prasad  AGE: 62 y.o. SEX: male  : 1965   MRN: [id number]    DATE: 2023  TIME: 9:42 AM    Assessment and Plan:    Bladder cancer with tiny recurrences as below-some of these may be premalignant, 3 lesions that were fulgurated completely. He has a very sensitive bladder and cannot tolerate intravesical therapy. He wishes to have repeat cystoscopy in 2024. He also wishes to get the PSA checked at that time. Follow-up for cystoscopy in 2024. Chief Complaint   No chief complaint on file.       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  Muscle was present and was uninvolved.  He had mitomycin instilled.  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 Reynold Gil has never had BCG.  After the mitomycin in September with a TURBT he had severe bladder pain with bladder spasms with referral to the urethra.  He referred to it as "a nightmare".  He cannot receive mitomycin anymore due to the inflammatory effects.  This is also the reason why no BCG.  Here for surveillance cystoscopy.  CT scan abdomen and pelvis 2022 showed an irregular enhancing partially circumferential bladder wall thickening with nodular margins anterior wall and dome but continuing along the anterior right bladder wall.  Kidneys were okay.  No sign metastases.  Of course that area on the bladder was followed up with the TURBT of benign lesions in December. Here for surveillance cystoscopy. Last cystoscopy was June 27, 2023 and I saw a tiny papillary recurrence 2 to 3 mm just lateral to the right ureteral orifice. I offered fulguration of this but he declined. He is here for 3-month recheck of this. No gross hematuria, but has more frequency since the last procedure. Otherwise doing well in terms of his general health. Prostate cancer screening: Again PSA has been ordered numerous times but has not been done. He has been counseled regarding this. Cystoscopy     Date/Time 9/5/2023 9:00 AM     Performed by  Vivek Tucker MD   Authorized by Vivek Tucker MD     Universal Protocol:  Consent: Verbal consent obtained. Written consent obtained. Procedure Details:  Procedure type: fulguration    Additional Procedure Details: Cystoscopy Procedure Note        Pre-operative Diagnosis: Bladder cancer surveillance    Post-operative Diagnosis: Same, with fulguration of tiny bladder tumors. Procedure: Flexible cystoscopy    Surgeon: Paula Dudley MD    Anesthesia: 1% Xylocaine per urethra    EBL: Minimal    Complications: none    Procedure Details   The risks, benefits, complications, treatment options, and expected outcomes were discussed with the patient. The patient concurred with the proposed plan, giving informed consent. Cystoscopy was performed today under local anesthesia, using sterile technique. The patient was placed in the supine position, prepped with Betadine, and draped in the usual sterile fashion. The flexible cystocope was used to inspect both the urethra and bladder    Findings:  Urethra: Normal without stricture. Prostate only minimally occlusive.     Bladder:  Smooth, not trabeculated and there were 3 very tiny recurrences, actually looking mostly premalignant right lateral wall and right lateral trigone-he agreed to fulguration of these so I took the Bugbee electrode and fulgurated them flat and deep. No biopsies taken. .  The orifices were orthotopic and intact. Specimens: none                 Complications:  None           Disposition: To home            Condition:  Stable          The following portions of the patient's history were reviewed and updated as appropriate: allergies, current medications, past family history, past medical history, past social history, past surgical history and problem list.  Past Medical History:   Diagnosis Date   • Bladder cancer ([address])    • Cancer ([address])    • DVT (deep venous thrombosis) ([address]) 09/2021    Right   • Hypertension      Past Surgical History:   Procedure Laterality Date   • ANKLE SURGERY Right    • CYSTOSCOPY N/A 08/30/2021    Procedure: CYSTOSCOPY;  Surgeon: Skyler Pena MD;  Location:  MAIN OR;  Service: Urology   • CYSTOSCOPY     • NC CYSTO W/REMOVAL OF LESIONS SMALL N/A 09/08/2022    Procedure: TRANSURETHRAL RESECTION OF BLADDER TUMOR (TURBT) and mitomycin installation;  Surgeon: Skyler Pena MD;  [facility]: [facility];  Service: Urology   • NC CYSTO W/REMOVAL OF LESIONS SMALL N/A 12/5/2022    Procedure: cysto bladder lesion biopsy fulguration;  Surgeon: Skyler Pena MD;  [facility]: [facility];  Service: Urology   • NC CYSTOURETHROSCOPY WITH BIOPSY N/A 12/06/2021    Procedure: CYSTOSCOPY, TURBT right lateral wall;  Surgeon: Skyler Pena MD;  [facility]: [facility];  Service: Urology   • TONSILLECTOMY     • TRANSURETHRAL RESECTION OF BLADDER TUMOR N/A 08/30/2021    Procedure: TRANSURETHRAL RESECTION OF BLADDER TUMOR (TURBT);   Surgeon: Skyler Pena MD;  Location:  MAIN OR;  Service: Urology     shoulder  Review of Systems   Review of Systems   Genitourinary:        As per HPI       Active Problem List     Patient Active Problem List   Diagnosis   • Smoking   • Bladder mass   • History of DVT (deep vein thrombosis)   • Hypertension   • Malignant neoplasm of anterior wall of bladder (HCC)   • Mild tetrahydrocannabinol (THC) abuse       Objective   /80   Pulse 60   Ht 5' 10" (1.778 m)   Wt 87.5 kg (193 lb)   BMI 27.69 kg/m²     Physical Exam  Vitals reviewed. Constitutional:       Appearance: Normal appearance. HENT:      Head: Normocephalic and atraumatic. Eyes:      Extraocular Movements: Extraocular movements intact. Pulmonary:      Effort: Pulmonary effort is normal.   Genitourinary:     Penis: Normal.    Musculoskeletal:         General: Normal range of motion. Cervical back: Normal range of motion. Skin:     Coloration: Skin is not jaundiced or pale. Neurological:      General: No focal deficit present. Mental Status: He is alert and oriented to person, place, and time. Mental status is at baseline. Psychiatric:         Mood and Affect: Mood normal.         Behavior: Behavior normal.         Thought Content:  Thought content normal.         Judgment: Judgment normal.             Current Medications     Current Outpatient Medications:   •  metoprolol succinate (TOPROL-XL) 50 mg 24 hr tablet, Take 1 tablet (50 mg total) by mouth daily, Disp: 90 tablet, Rfl: 1  •  albuterol (Ventolin HFA) 90 mcg/act inhaler, Inhale 2 puffs every 6 (six) hours as needed for wheezing (Patient not taking: Reported on 6/27/2023), Disp: 18 g, Rfl: 5        Charlie Andres MD

## 2023-11-20 NOTE — PROGRESS NOTES
BMI Counseling: There is no height or weight on file to calculate BMI  The BMI is above normal  Nutrition recommendations include decreasing portion sizes and encouraging healthy choices of fruits and vegetables  Exercise recommendations include moderate physical activity 150 minutes/week  No pharmacotherapy was ordered  Rationale for BMI follow-up plan is due to patient being overweight or obese  Assessment/Plan:  Problem List Items Addressed This Visit        Cardiovascular and Mediastinum    Hypertension    Relevant Medications    enoxaparin (LOVENOX) 40 mg/0 4 mL    Other Relevant Orders    Echo stress test, exercise       Genitourinary    Malignant neoplasm of anterior wall of bladder (HCC)    Relevant Medications    enoxaparin (LOVENOX) 40 mg/0 4 mL    Other Relevant Orders    Echo stress test, exercise       Other    Smoking    Relevant Medications    enoxaparin (LOVENOX) 40 mg/0 4 mL    Other Relevant Orders    Echo stress test, exercise    History of DVT (deep vein thrombosis)    Relevant Medications    enoxaparin (LOVENOX) 40 mg/0 4 mL    Other Relevant Orders    Echo stress test, exercise    Chronic anticoagulation    Relevant Medications    enoxaparin (LOVENOX) 40 mg/0 4 mL    Other Relevant Orders    Echo stress test, exercise    Bladder mass    Relevant Medications    enoxaparin (LOVENOX) 40 mg/0 4 mL    Other Relevant Orders    Echo stress test, exercise      Other Visit Diagnoses     Visit for pre-operative examination    -  Primary    Relevant Medications    enoxaparin (LOVENOX) 40 mg/0 4 mL    Other Relevant Orders    Echo stress test, exercise    Abnormal EKG        Relevant Medications    enoxaparin (LOVENOX) 40 mg/0 4 mL    Other Relevant Orders    Echo stress test, exercise           Diagnoses and all orders for this visit:    Visit for pre-operative examination  -     Echo stress test, exercise; Future  -     enoxaparin (LOVENOX) 40 mg/0 4 mL;  Inject 0 4 mL (40 mg total) under the skin daily at bedtime Last dose of eliquis on 9/1  Start lovenox on 9/2 and continue daily with last dose on 9/6  10    Bladder mass  -     Echo stress test, exercise; Future  -     enoxaparin (LOVENOX) 40 mg/0 4 mL; Inject 0 4 mL (40 mg total) under the skin daily at bedtime Last dose of eliquis on 9/1  Start lovenox on 9/2 and continue daily with last dose on 9/6  10    Malignant neoplasm of anterior wall of bladder ([address] )  -     Echo stress test, exercise; Future  -     enoxaparin (LOVENOX) 40 mg/0 4 mL; Inject 0 4 mL (40 mg total) under the skin daily at bedtime Last dose of eliquis on 9/1  Start lovenox on 9/2 and continue daily with last dose on 9/6  10    Primary hypertension  -     Echo stress test, exercise; Future  -     enoxaparin (LOVENOX) 40 mg/0 4 mL; Inject 0 4 mL (40 mg total) under the skin daily at bedtime Last dose of eliquis on 9/1  Start lovenox on 9/2 and continue daily with last dose on 9/6  10    History of DVT (deep vein thrombosis)  -     Echo stress test, exercise; Future  -     enoxaparin (LOVENOX) 40 mg/0 4 mL; Inject 0 4 mL (40 mg total) under the skin daily at bedtime Last dose of eliquis on 9/1  Start lovenox on 9/2 and continue daily with last dose on 9/6  10    Chronic anticoagulation  -     Echo stress test, exercise; Future  -     enoxaparin (LOVENOX) 40 mg/0 4 mL; Inject 0 4 mL (40 mg total) under the skin daily at bedtime Last dose of eliquis on 9/1  Start lovenox on 9/2 and continue daily with last dose on 9/6  10    Smoking  -     Echo stress test, exercise; Future  -     enoxaparin (LOVENOX) 40 mg/0 4 mL; Inject 0 4 mL (40 mg total) under the skin daily at bedtime Last dose of eliquis on 9/1  Start lovenox on 9/2 and continue daily with last dose on 9/6  10    Abnormal EKG  -     Echo stress test, exercise; Future  -     enoxaparin (LOVENOX) 40 mg/0 4 mL; Inject 0 4 mL (40 mg total) under the skin daily at bedtime Last dose of eliquis on 9/1   Start lovenox on 9/2 and continue daily with last dose on 9/6  10      No problem-specific Assessment & Plan notes found for this encounter  A/P: PAT tests done and reviewed  C/o EKG and labs  Labs ok, but EKG abnormal  No prior tracings available  Did tolerate prior similar procedures w/o issues   Pt and co-morbidities are stable  Pt is a [employer] and carries a cardiac risk of about 1%, but given abnormal EKG and his multiple risks, including male, age, smoker, DVT, and HTN, feel he should have a stress echo    Recommend holding any ASA, NSAID's, omega 3, and MVT one week prior to the procedure  Given DVT history and chronic anticoagulation, pt needs bridging with lovenox and will order  ON the day of sx, may take his metoprolol with a sip of water  Recommend a post ekg  Aggressive pulmonary secretion control due to smoking  If remains in patient, consider Nicoderm patch  Aggressive DVT prophylaxis and resume DOAC ASAP  Pt clearance pending stress echo,but expect him to be cleared     Thanks and good luck  Subjective:      Patient ID: Mariama Grimm is a 64 y o  male  WM presents at the request of Dr Uri Shultz for pre-op eval for upcoming Bladder Mass extraction  tentatively scheduled for 9/8/22  Since last visit, doing well and no recent illnesses  Remains active w/o difficulty and no falls  No travel history  Denies depression  No recent illnesses  No fever, chills, or sweats  No unexplained wt changes  Denies CP, SOB, or palpitations  No edema  No orthopnea or PND  No sz or syncope  No changes in bowel or bladder habits  PMH includes DVT with chronic anticoagulation, Bladder cancer, HTN, Tobacco use, and THC use  Past sx include ankle sx, tonsil, TURP, and several cystos and reports no problems with prior procedures or anesthesia  Daily smoking and occasional  ETOH use  Positive  history of DVT, but no PE  NO history of bleeding issues and is positive for being on anti-coagulants in the form of DOAC  Denies dental plates   Denies C spine issues  No objections to getting blood products if deemed necessary  Has had PAT testing done  The following portions of the patient's history were reviewed and updated as appropriate:   He has a past medical history of Bladder cancer ([facility] [address] ), Cancer ([facility] ), and DVT (deep venous thrombosis) ([facility] )  ,  does not have any pertinent problems on file  ,   has a past surgical history that includes Transurethral resection of bladder tumor (N/A, 8/30/2021); CYSTOSCOPY (N/A, 8/30/2021); Ankle surgery; Tonsillectomy; and pr cystourethroscopy,biopsy (N/A, 12/6/2021)  ,  family history is not on file  ,   reports that he has been smoking  He has a 30 00 pack-year smoking history  He has never used smokeless tobacco  He reports current alcohol use  He reports previous drug use  Drug: Marijuana  ,  is allergic to penicillins     Current Outpatient Medications   Medication Sig Dispense Refill    enoxaparin (LOVENOX) 40 mg/0 4 mL Inject 0 4 mL (40 mg total) under the skin daily at bedtime Last dose of eliquis on 9/1  Start lovenox on 9/2 and continue daily with last dose on 9/6  10 2 mL 0    metoprolol succinate (Toprol XL) 50 mg 24 hr tablet Take 1 tablet (50 mg total) by mouth daily 90 tablet 3    Eliquis 5 MG TAKE 1 TABLET BY MOUTH TWICE A DAY (Patient not taking: Reported on 8/26/2022) 60 tablet 5     No current facility-administered medications for this visit  Review of Systems   Constitutional: Negative for activity change, chills, diaphoresis, fatigue and fever  HENT: Negative  Eyes: Negative for visual disturbance  Respiratory: Negative for cough, chest tightness, shortness of breath and wheezing  Cardiovascular: Negative for chest pain, palpitations and leg swelling  Gastrointestinal: Negative for abdominal pain, constipation, diarrhea, nausea and vomiting  Endocrine: Negative for cold intolerance and heat intolerance  Genitourinary: Negative for difficulty urinating, dysuria, frequency and hematuria  Musculoskeletal: Negative for arthralgias, gait problem and myalgias  Neurological: Negative for dizziness, seizures, syncope, weakness, light-headedness and headaches  Psychiatric/Behavioral: Negative for confusion, dysphoric mood and sleep disturbance  The patient is not nervous/anxious  PHQ-2/9 Depression Screening          Objective:  Vitals:    08/26/22 1127   BP: 134/78   Pulse: 80   Temp: (!) 97 4 °F (36 3 °C)   SpO2: 97%   Weight: 88 2 kg (194 lb 6 oz)   Height: 5' 10" (1 778 m)     Body mass index is 27 89 kg/m²  Physical Exam  Vitals and nursing note reviewed  Constitutional:       General: He is not in acute distress  Appearance: Normal appearance  He is not ill-appearing  HENT:      Head: Normocephalic and atraumatic  Comments: NO oropharyngeal obstructions  Poor dentition  Mouth/Throat:      Mouth: Mucous membranes are moist    Eyes:      Extraocular Movements: Extraocular movements intact  Conjunctiva/sclera: Conjunctivae normal       Pupils: Pupils are equal, round, and reactive to light  Neck:      Vascular: No carotid bruit  Comments: C spine w/o restriction  Cardiovascular:      Rate and Rhythm: Normal rate and regular rhythm  Heart sounds: Normal heart sounds  Pulmonary:      Effort: Pulmonary effort is normal  No respiratory distress  Breath sounds: Normal breath sounds  No wheezing, rhonchi or rales  Abdominal:      General: Bowel sounds are normal  There is no distension  Palpations: Abdomen is soft  Tenderness: There is no abdominal tenderness  Musculoskeletal:         General: Normal range of motion  Cervical back: Normal range of motion and neck supple  No rigidity or tenderness  Right lower leg: No edema  Left lower leg: No edema  Lymphadenopathy:      Cervical: No cervical adenopathy  Neurological:      General: No focal deficit present        Mental Status: He is alert and oriented to person, place, and time  Mental status is at baseline  Cranial Nerves: No cranial nerve deficit  Motor: No weakness  Coordination: Coordination normal       Gait: Gait normal       Deep Tendon Reflexes: Reflexes normal    Psychiatric:         Mood and Affect: Mood normal          Behavior: Behavior normal          Thought Content: Thought content normal          Judgment: Judgment normal          BMI Counseling: Body mass index is 27 89 kg/m²  The BMI is above normal  Nutrition recommendations include reducing portion sizes, decreasing overall calorie intake, reducing intake of saturated fat and trans fat and reducing intake of cholesterol  Exercise recommendations include moderate aerobic physical activity for 150 minutes/week  25

## 2024-01-05 ENCOUNTER — NURSE TRIAGE (OUTPATIENT)
Age: 59
End: 2024-01-05

## 2024-01-05 NOTE — TELEPHONE ENCOUNTER
"Answer Assessment - Initial Assessment Questions  1. REASON FOR CALL or QUESTION: \"What is your reason for calling today?\" or \"How can I best help you?\" or \"What question do you have that I can help answer?\"      Pt calling to verify appt time and date. Informed him of his appt next week. No further assistance needed.    Protocols used: Information Only Call - No Triage-ADULT-[place]    "

## 2024-01-10 NOTE — PROGRESS NOTES
"UROLOGY PROGRESS NOTE   [facility]  [address]  [address]  [place] [address]  [phone]  Fax:[phone]  [email or web address]      NAME: John Kenny  AGE: 58 y.o. SEX: male  : 1965   MRN: [id number]    DATE: 2024  TIME: 11:01 AM    Assessment and Plan:    Bladder cancer as below: He wishes to wait 6 months until his next surveillance cystoscopy.  Currently no recurrence.  He still has urinary frequency but he does drink fluids all day long.    Prostate cancer screening: He will get the PSA that I ordered a couple weeks.               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  Muscle was present and was uninvolved.  He had mitomycin instilled.  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  He has never had BCG.  After the mitomycin in September with a TURBT he had severe bladder pain with bladder spasms with referral to the urethra.  He referred to it as \"a nightmare\".  He cannot receive mitomycin anymore due to the inflammatory effects.  This is also the reason why no BCG.  Here for surveillance cystoscopy.  CT scan abdomen and pelvis 2022 showed an irregular enhancing partially circumferential bladder wall thickening with nodular margins anterior wall and dome but continuing along the anterior right bladder wall.  Kidneys were " okay.  No sign metastases.  Of course that area on the bladder was followed up with the TURBT of benign lesions in December.  Here for surveillance cystoscopy -last cystoscopy was by me September 5, 2023 which showed possible tiny recurrences that were fulgurated completely that looked premalignant right lateral wall and right lateral trigone.  No biopsies were taken.    Prostate cancer screening: He was supposed to have a PSA for this visit.       Cystoscopy     Date/Time  1/11/2024 10:30 AM     Performed by  Kirill Rivera MD   Authorized by  Kirill Rivera MD     Universal Protocol:  Consent: Verbal consent obtained. Written consent obtained.      Procedure Details:  Procedure type: cystoscopy    Additional Procedure Details: Cystoscopy Procedure Note        Pre-operative Diagnosis: Bladder cancer surveillance    Post-operative Diagnosis: Same, no recurrence    Procedure: Flexible cystoscopy    Surgeon: Kirill Rivera MD    Anesthesia: 1% Xylocaine per urethra    EBL: Minimal    Complications: none    Procedure Details   The risks, benefits, complications, treatment options, and expected outcomes were discussed with the patient. The patient concurred with the proposed plan, giving informed consent.    Cystoscopy was performed today under local anesthesia, using sterile technique. The patient was placed in the supine position, prepped with Betadine, and draped in the usual sterile fashion. The flexible cystocope was used to inspect both the urethra and bladder    Findings:  Urethra: Normal without stricture.  Prostate minimally occlusive.    Bladder:  Smooth, not trabeculated and there were no stones tumors or other lesions.  The orifices were orthotopic and intact.  No recurrence           Specimens: none                 Complications:  None           Disposition: To home            Condition:  Stable              The following portions of the patient's history were reviewed and updated as appropriate: allergies,  "current medications, past family history, past medical history, past social history, past surgical history and problem list.  Past Medical History:   Diagnosis Date    Bladder cancer (HCC)     Cancer (HCC)     DVT (deep venous thrombosis) (HCC) 09/2021    Right    Hypertension      Past Surgical History:   Procedure Laterality Date    ANKLE SURGERY Right     CYSTOSCOPY N/A 08/30/2021    Procedure: CYSTOSCOPY;  Surgeon: Kirill Rivera MD;  Location: [facility];  Service: Urology    CYSTOSCOPY      W[place] CYSTO W/REMOVAL OF LESIONS SMALL N/A 09/08/2022    Procedure: TRANSURETHRAL RESECTION OF BLADDER TUMOR (TURBT) and mitomycin installation;  Surgeon: Kirill Rivera MD;  Location: [facility];  Service: Urology    W[place] CYSTO W/REMOVAL OF LESIONS SMALL N/A 12/5/2022    Procedure: cysto bladder lesion biopsy fulguration;  Surgeon: Kirill Rivera MD;  Location: [facility];  Service: Urology    [place] CYSTOURETHROSCOPY WITH BIOPSY N/A 12/06/2021    Procedure: CYSTOSCOPY, TURBT right lateral wall;  Surgeon: Kirill Rivera MD;  Location: [facility];  Service: Urology    TONSILLECTOMY      TRANSURETHRAL RESECTION OF BLADDER TUMOR N/A 08/30/2021    Procedure: TRANSURETHRAL RESECTION OF BLADDER TUMOR (TURBT);  Surgeon: Kirill Rivera MD;  Location: [facility];  Service: Urology     shoulder  Review of Systems   Review of Systems   Genitourinary:  Positive for frequency.       Active Problem List     Patient Active Problem List   Diagnosis    Smoking    Bladder mass    History of DVT (deep vein thrombosis)    Hypertension    Malignant neoplasm of anterior wall of bladder (HCC)    Mild tetrahydrocannabinol (THC) abuse       Objective   /90   Pulse 66   Ht 5' 10\" (1.778 m)   Wt 92.1 kg (203 lb)   SpO2 99%   BMI 29.13 kg/m²     Physical Exam  Vitals reviewed.   Constitutional:       Appearance: Normal appearance.   HENT:      Head: Normocephalic and atraumatic.   Eyes:      Extraocular Movements: Extraocular movements intact.   Pulmonary: "      Effort: Pulmonary effort is normal.   Genitourinary:     Penis: Normal.    Musculoskeletal:         General: Normal range of motion.      Cervical back: Normal range of motion.   Skin:     Coloration: Skin is not jaundiced or pale.   Neurological:      General: No focal deficit present.      Mental Status: He is alert and oriented to person, place, and time. Mental status is at baseline.   Psychiatric:         Mood and Affect: Mood normal.         Behavior: Behavior normal.         Thought Content: Thought content normal.         Judgment: Judgment normal.             Current Medications     Current Outpatient Medications:     ciprofloxacin (Cipro) 500 mg tablet, Take 1 tablet (500 mg total) by mouth once for 1 dose Take 1 dose after  procedure, Disp: 1 tablet, Rfl: 0    metoprolol succinate (TOPROL-XL) 50 mg 24 hr tablet, Take 1 tablet (50 mg total) by mouth daily, Disp: 90 tablet, Rfl: 1    albuterol (Ventolin HFA) 90 mcg/act inhaler, Inhale 2 puffs every 6 (six) hours as needed for wheezing (Patient not taking: Reported on 6/27/2023), Disp: 18 g, Rfl: 5        Kirill Rivera MD

## 2024-01-11 ENCOUNTER — PROCEDURE VISIT (OUTPATIENT)
Dept: UROLOGY | Facility: CLINIC | Age: 59
End: 2024-01-11
Payer: COMMERCIAL

## 2024-01-11 VITALS
BODY MASS INDEX: 29.06 KG/M2 | SYSTOLIC BLOOD PRESSURE: 154 MMHG | DIASTOLIC BLOOD PRESSURE: 90 MMHG | WEIGHT: 203 LBS | HEIGHT: 70 IN | HEART RATE: 66 BPM | OXYGEN SATURATION: 99 %

## 2024-01-11 DIAGNOSIS — C67.3 MALIGNANT NEOPLASM OF ANTERIOR WALL OF BLADDER (HCC): Primary | ICD-10-CM

## 2024-01-11 LAB
SL AMB  POCT GLUCOSE, UA: NEGATIVE
SL AMB LEUKOCYTE ESTERASE,UA: NEGATIVE
SL AMB POCT BILIRUBIN,UA: NEGATIVE
SL AMB POCT BLOOD,UA: NEGATIVE
SL AMB POCT CLARITY,UA: CLEAR
SL AMB POCT COLOR,UA: YELLOW
SL AMB POCT KETONES,UA: NEGATIVE
SL AMB POCT NITRITE,UA: NEGATIVE
SL AMB POCT PH,UA: 5.5
SL AMB POCT SPECIFIC GRAVITY,UA: 1.01
SL AMB POCT URINE PROTEIN: NEGATIVE
SL AMB POCT UROBILINOGEN: 0.2

## 2024-01-11 PROCEDURE — 81002 URINALYSIS NONAUTO W/O SCOPE: CPT | Performed by: UROLOGY

## 2024-01-11 PROCEDURE — 99213 OFFICE O/P EST LOW 20 MIN: CPT | Performed by: UROLOGY

## 2024-01-11 PROCEDURE — 52000 CYSTOURETHROSCOPY: CPT | Performed by: UROLOGY

## 2024-01-11 RX ORDER — CIPROFLOXACIN 500 MG/1
500 TABLET, FILM COATED ORAL ONCE
Qty: 1 TABLET | Refills: 0 | Status: SHIPPED | OUTPATIENT
Start: 2024-01-11 | End: 2024-01-11

## 2024-01-11 NOTE — LETTER
2024     Pranav Dyer DO  [address]  [address]  [facility] [address]    Patient: John Kenny   YOB: 1965   Date of Visit: 2024       Dear Dr. Dyer:    Thank you for referring John Kenny to me for evaluation. Below are my notes for this consultation.    If you have questions, please do not hesitate to call me. I look forward to following your patient along with you.         Sincerely,        Kirill Rivera MD        CC: No Recipients    Kirill Rivera MD  2024 11:03 AM  Sign when Signing Visit  UROLOGY PROGRESS NOTE   [facility]  [address] [place] [address]  [address]  [place] [address]  [phone]  Fax:[phone]  [email or web address]      NAME: John Kenny  AGE: 58 y.o. SEX: male  : 1965   MRN: [id number]    DATE: 2024  TIME: 11:01 AM    Assessment and Plan:    Bladder cancer as below: He wishes to wait 6 months until his next surveillance cystoscopy.  Currently no recurrence.  He still has urinary frequency but he does drink fluids all day long.    Prostate cancer screening: He will get the PSA that I ordered a couple weeks.               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  Muscle was present and was uninvolved.  He had mitomycin instilled.  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  He has never  "had BCG.  After the mitomycin in September with a TURBT he had severe bladder pain with bladder spasms with referral to the urethra.  He referred to it as \"a nightmare\".  He cannot receive mitomycin anymore due to the inflammatory effects.  This is also the reason why no BCG.  Here for surveillance cystoscopy.  CT scan abdomen and pelvis November 8, 2022 showed an irregular enhancing partially circumferential bladder wall thickening with nodular margins anterior wall and dome but continuing along the anterior right bladder wall.  Kidneys were okay.  No sign metastases.  Of course that area on the bladder was followed up with the TURBT of benign lesions in December.  Here for surveillance cystoscopy -last cystoscopy was by me September 5, 2023 which showed possible tiny recurrences that were fulgurated completely that looked premalignant right lateral wall and right lateral trigone.  No biopsies were taken.    Prostate cancer screening: He was supposed to have a PSA for this visit.       Cystoscopy     Date/Time  1/11/2024 10:30 AM     Performed by  Kirill Rivera MD   Authorized by  Kirill Rivera MD     Universal Protocol:  Consent: Verbal consent obtained. Written consent obtained.      Procedure Details:  Procedure type: cystoscopy    Additional Procedure Details: Cystoscopy Procedure Note        Pre-operative Diagnosis: Bladder cancer surveillance    Post-operative Diagnosis: Same, no recurrence    Procedure: Flexible cystoscopy    Surgeon: Kirill Rivera MD    Anesthesia: 1% Xylocaine per urethra    EBL: Minimal    Complications: none    Procedure Details   The risks, benefits, complications, treatment options, and expected outcomes were discussed with the patient. The patient concurred with the proposed plan, giving informed consent.    Cystoscopy was performed today under local anesthesia, using sterile technique. The patient was placed in the supine position, prepped with Betadine, and draped in the usual sterile " fashion. The flexible cystocope was used to inspect both the urethra and bladder    Findings:  Urethra: Normal without stricture.  Prostate minimally occlusive.    Bladder:  Smooth, not trabeculated and there were no stones tumors or other lesions.  The orifices were orthotopic and intact.  No recurrence           Specimens: none                 Complications:  None           Disposition: To home            Condition:  Stable              The following portions of the patient's history were reviewed and updated as appropriate: allergies, current medications, past family history, past medical history, past social history, past surgical history and problem list.  Past Medical History:   Diagnosis Date   • Bladder cancer (HCC)    • Cancer (HCC)    • DVT (deep venous thrombosis) (HCC) 09/2021    Right   • Hypertension      Past Surgical History:   Procedure Laterality Date   • ANKLE SURGERY Right    • CYSTOSCOPY N/A 08/30/2021    Procedure: CYSTOSCOPY;  Surgeon: Kirill Rivera MD;  Location:  MAIN OR;  Service: Urology   • CYSTOSCOPY     • NM CYSTO W/REMOVAL OF LESIONS SMALL N/A 09/08/2022    Procedure: TRANSURETHRAL RESECTION OF BLADDER TUMOR (TURBT) and mitomycin installation;  Surgeon: Kirill Rivera MD;  Location: [facility];  Service: Urology   • NM CYSTO W/REMOVAL OF LESIONS SMALL N/A 12/5/2022    Procedure: cysto bladder lesion biopsy fulguration;  Surgeon: Kirill Rivera MD;  [facility]: [facility];  Service: Urology   • NM CYSTOURETHROSCOPY WITH BIOPSY N/A 12/06/2021    Procedure: CYSTOSCOPY, TURBT right lateral wall;  Surgeon: Kirill Rivera MD;  [facility]: [facility];  Service: Urology   • TONSILLECTOMY     • TRANSURETHRAL RESECTION OF BLADDER TUMOR N/A 08/30/2021    Procedure: TRANSURETHRAL RESECTION OF BLADDER TUMOR (TURBT);  Surgeon: Kirill Rivera MD;  Location:  MAIN OR;  Service: Urology     shoulder  Review of Systems   Review of Systems   Genitourinary:  Positive for frequency.       Active Problem List  "    Patient Active Problem List   Diagnosis   • Smoking   • Bladder mass   • History of DVT (deep vein thrombosis)   • Hypertension   • Malignant neoplasm of anterior wall of bladder (HCC)   • Mild tetrahydrocannabinol (THC) abuse       Objective   /90   Pulse 66   Ht 5' 10\" (1.778 m)   Wt 92.1 kg (203 lb)   SpO2 99%   BMI 29.13 kg/m²     Physical Exam  Vitals reviewed.   Constitutional:       Appearance: Normal appearance.   HENT:      Head: Normocephalic and atraumatic.   Eyes:      Extraocular Movements: Extraocular movements intact.   Pulmonary:      Effort: Pulmonary effort is normal.   Genitourinary:     Penis: Normal.    Musculoskeletal:         General: Normal range of motion.      Cervical back: Normal range of motion.   Skin:     Coloration: Skin is not jaundiced or pale.   Neurological:      General: No focal deficit present.      Mental Status: He is alert and oriented to person, place, and time. Mental status is at baseline.   Psychiatric:         Mood and Affect: Mood normal.         Behavior: Behavior normal.         Thought Content: Thought content normal.         Judgment: Judgment normal.             Current Medications     Current Outpatient Medications:   •  ciprofloxacin (Cipro) 500 mg tablet, Take 1 tablet (500 mg total) by mouth once for 1 dose Take 1 dose after  procedure, Disp: 1 tablet, Rfl: 0  •  metoprolol succinate (TOPROL-XL) 50 mg 24 hr tablet, Take 1 tablet (50 mg total) by mouth daily, Disp: 90 tablet, Rfl: 1  •  albuterol (Ventolin HFA) 90 mcg/act inhaler, Inhale 2 puffs every 6 (six) hours as needed for wheezing (Patient not taking: Reported on 6/27/2023), Disp: 18 g, Rfl: 5        Kirill Rivera MD         "

## 2024-02-18 ENCOUNTER — HOSPITAL ENCOUNTER (EMERGENCY)
Facility: HOSPITAL | Age: 59
Discharge: HOME | End: 2024-02-18
Attending: STUDENT IN AN ORGANIZED HEALTH CARE EDUCATION/TRAINING PROGRAM
Payer: COMMERCIAL

## 2024-02-18 VITALS
DIASTOLIC BLOOD PRESSURE: 74 MMHG | SYSTOLIC BLOOD PRESSURE: 173 MMHG | RESPIRATION RATE: 17 BRPM | TEMPERATURE: 97.9 F | HEART RATE: 76 BPM | OXYGEN SATURATION: 96 %

## 2024-02-18 DIAGNOSIS — M54.50 ACUTE LOW BACK PAIN: Primary | ICD-10-CM

## 2024-02-18 PROCEDURE — 99284 EMERGENCY DEPT VISIT MOD MDM: CPT | Performed by: STUDENT IN AN ORGANIZED HEALTH CARE EDUCATION/TRAINING PROGRAM

## 2024-02-18 PROCEDURE — 96372 THER/PROPH/DIAG INJ SC/IM: CPT

## 2024-02-18 PROCEDURE — 99282 EMERGENCY DEPT VISIT SF MDM: CPT

## 2024-02-18 RX ORDER — METHOCARBAMOL 500 MG/1
1000 TABLET, FILM COATED ORAL ONCE
Status: COMPLETED | OUTPATIENT
Start: 2024-02-18 | End: 2024-02-18

## 2024-02-18 RX ORDER — PREDNISONE 20 MG/1
40 TABLET ORAL ONCE
Status: COMPLETED | OUTPATIENT
Start: 2024-02-18 | End: 2024-02-18

## 2024-02-18 RX ORDER — FENTANYL CITRATE 50 UG/ML
25 INJECTION, SOLUTION INTRAMUSCULAR; INTRAVENOUS ONCE
Status: COMPLETED | OUTPATIENT
Start: 2024-02-18 | End: 2024-02-18

## 2024-02-18 RX ORDER — KETOROLAC TROMETHAMINE 30 MG/ML
30 INJECTION, SOLUTION INTRAMUSCULAR; INTRAVENOUS ONCE
Status: COMPLETED | OUTPATIENT
Start: 2024-02-18 | End: 2024-02-18

## 2024-02-18 RX ORDER — KETOROLAC TROMETHAMINE 10 MG/1
10 TABLET, FILM COATED ORAL EVERY 6 HOURS PRN
Qty: 20 TABLET | Refills: 0 | Status: SHIPPED | OUTPATIENT
Start: 2024-02-18

## 2024-02-18 RX ORDER — METHOCARBAMOL 500 MG/1
500 TABLET, FILM COATED ORAL 2 TIMES DAILY
Qty: 20 TABLET | Refills: 0 | Status: SHIPPED | OUTPATIENT
Start: 2024-02-18 | End: 2024-02-20

## 2024-02-18 RX ORDER — DIAZEPAM 2 MG/1
2 TABLET ORAL ONCE
Status: COMPLETED | OUTPATIENT
Start: 2024-02-18 | End: 2024-02-18

## 2024-02-18 RX ADMIN — DIAZEPAM 2 MG: 2 TABLET ORAL at 05:18

## 2024-02-18 RX ADMIN — KETOROLAC TROMETHAMINE 30 MG: 30 INJECTION, SOLUTION INTRAMUSCULAR; INTRAVENOUS at 04:14

## 2024-02-18 RX ADMIN — PREDNISONE 40 MG: 20 TABLET ORAL at 05:18

## 2024-02-18 RX ADMIN — FENTANYL CITRATE 25 MCG: 50 INJECTION INTRAMUSCULAR; INTRAVENOUS at 05:45

## 2024-02-18 RX ADMIN — METHOCARBAMOL TABLETS 1000 MG: 500 TABLET, COATED ORAL at 04:14

## 2024-02-18 NOTE — Clinical Note
John Kenny was seen and treated in our emergency department on 2/18/2024.                Diagnosis:     Peter  .    He may return on this date:     Patient is remaining on light duty until evaluated by family physician or orthopedic     If you have any questions or concerns, please don't hesitate to call.      David Jacinto MD    ______________________________           _______________          _______________  Hospital Representative                              Date                                Time

## 2024-02-18 NOTE — ED PROVIDER NOTES
History  Chief Complaint   Patient presents with    Back Pain     Was shoveling snow on Tuesday during the big snow storm; Lower right side of back pain; tweeked back       Back Pain  Associated symptoms: no abdominal pain, no chest pain, no fever, no headaches, no numbness and no weakness     this a 58-year-old male who presents the emergency department ambulatory for concerns of acute back pain.  Patient states he was out shoveling his driveway on Tuesday when he felt a twisting motion of his low back and has subsequently had transient pain/spasms to the low back region.  He has been taking ibuprofen and Aleve at home with minimal alleviation of symptoms.  He denies any saddle anesthesia ambulatory dysfunction urinary incontinence, gait abnormalities.  Patient also states that he has to work tomorrow and is requesting a note for light duty.    Prior to Admission Medications   Prescriptions Last Dose Informant Patient Reported? Taking?   albuterol (Ventolin HFA) 90 mcg/act inhaler  Self No No   Sig: Inhale 2 puffs every 6 (six) hours as needed for wheezing   Patient not taking: Reported on 6/27/2023   metoprolol succinate (TOPROL-XL) 50 mg 24 hr tablet  Self No No   Sig: Take 1 tablet (50 mg total) by mouth daily      Facility-Administered Medications: None       Past Medical History:   Diagnosis Date    Bladder cancer (HCC)     Cancer (HCC)     DVT (deep venous thrombosis) (HCC) 09/2021    Right    Hypertension        Past Surgical History:   Procedure Laterality Date    ANKLE SURGERY Right     CYSTOSCOPY N/A 08/30/2021    Procedure: CYSTOSCOPY;  Surgeon: Kirill Rivera MD;  Location:  MAIN OR;  Service: Urology    CYSTOSCOPY      RI CYSTO W/REMOVAL OF LESIONS SMALL N/A 09/08/2022    Procedure: TRANSURETHRAL RESECTION OF BLADDER TUMOR (TURBT) and mitomycin installation;  Surgeon: Kirill Rivera MD;  [facility];  Service: Urology    RI CYSTO W/REMOVAL OF LESIONS SMALL N/A 12/5/2022    Procedure: cysto  bladder lesion biopsy fulguration;  Surgeon: Kirill Rivera MD;  [facility];  Service: Urology    ID CYSTOURETHROSCOPY WITH BIOPSY N/A 12/06/2021    Procedure: CYSTOSCOPY, TURBT right lateral wall;  Surgeon: Kirill Rivera MD;  [facility];  Service: Urology    TONSILLECTOMY      TRANSURETHRAL RESECTION OF BLADDER TUMOR N/A 08/30/2021    Procedure: TRANSURETHRAL RESECTION OF BLADDER TUMOR (TURBT);  Surgeon: Kirill Rivera MD;  Location:  MAIN OR;  Service: Urology       Family History   Problem Relation Age of Onset    Hypertension Mother      I have reviewed and agree with the history as documented.    E-Cigarette/Vaping    E-Cigarette Use Never User      E-Cigarette/Vaping Substances    Nicotine No     THC No     CBD No     Flavoring No     Other No     Unknown No      Social History     Tobacco Use    Smoking status: Every Day     Current packs/day: 1.50     Average packs/day: 1.5 packs/day for 20.0 years (30.0 ttl pk-yrs)     Types: Cigarettes    Smokeless tobacco: Never   Vaping Use    Vaping status: Never Used   Substance Use Topics    Alcohol use: Yes     Alcohol/week: 7.0 standard drinks of alcohol     Types: 7 Cans of beer per week     Comment: weekends       Review of Systems   Constitutional:  Negative for activity change, appetite change, chills, fatigue and fever.   HENT:  Negative for congestion, dental problem, drooling, ear discharge, ear pain, facial swelling, postnasal drip, rhinorrhea and sinus pain.    Eyes:  Negative for photophobia, pain, discharge and itching.   Respiratory:  Negative for apnea, cough, chest tightness and shortness of breath.    Cardiovascular:  Negative for chest pain and leg swelling.   Gastrointestinal:  Negative for abdominal distention, abdominal pain, anal bleeding, constipation, diarrhea and nausea.   Endocrine: Negative for cold intolerance, heat intolerance and polydipsia.   Genitourinary:  Negative for difficulty urinating.   Musculoskeletal:  Positive  for back pain. Negative for arthralgias, gait problem, joint swelling and myalgias.   Skin:  Negative for color change and pallor.   Allergic/Immunologic: Negative for immunocompromised state.   Neurological:  Negative for dizziness, seizures, facial asymmetry, weakness, light-headedness, numbness and headaches.   Psychiatric/Behavioral:  Negative for agitation, behavioral problems, confusion, decreased concentration and dysphoric mood.    All other systems reviewed and are negative.      Physical Exam  Physical Exam  Constitutional:       Appearance: He is well-developed.   HENT:      Head: Normocephalic.   Eyes:      Pupils: Pupils are equal, round, and reactive to light.   Cardiovascular:      Rate and Rhythm: Normal rate and regular rhythm.   Pulmonary:      Effort: Pulmonary effort is normal.      Breath sounds: Normal breath sounds.   Abdominal:      General: Bowel sounds are normal.      Palpations: Abdomen is soft.   Musculoskeletal:         General: Normal range of motion.      Cervical back: Normal range of motion and neck supple.      Comments: Tenderness palpation paraspinal muscles of L3-L4.   Skin:     General: Skin is warm.         Vital Signs  ED Triage Vitals [02/18/24 0411]   Temperature Pulse Respirations Blood Pressure SpO2   97.9 °F (36.6 °C) 77 18 (!) 214/94 96 %      Temp src Heart Rate Source Patient Position - Orthostatic VS BP Location FiO2 (%)   -- Monitor -- Left arm --      Pain Score       8           Vitals:    02/18/24 0411   BP: (!) 214/94   Pulse: 77         Visual Acuity      ED Medications  Medications   ketorolac (TORADOL) injection 30 mg (30 mg Intramuscular Given 2/18/24 0414)   methocarbamol (ROBAXIN) tablet 1,000 mg (1,000 mg Oral Given 2/18/24 0414)   predniSONE tablet 40 mg (40 mg Oral Given 2/18/24 0518)   diazepam (VALIUM) tablet 2 mg (2 mg Oral Given 2/18/24 0518)   fentanyl citrate (PF) 100 MCG/2ML 25 mcg (25 mcg Intramuscular Given 2/18/24 0545)       Diagnostic  Studies  Results Reviewed       None                   No orders to display              Procedures  Procedures         ED Course  ED Course as of 02/18/24 0551   Sun Feb 18, 2024   0406 Patient ambulating unassisted to exam room.   0456 Patient reports no improvement in his symptoms.   0534 Patient reexamined he is resting comfortably no acute distress but continues to endorse that his back pain is 8/10 with 0 improvement.  Will trial dose of IM fentanyl.   0551 Patient reports significant improvement in his pain.  Requesting to go home                                             Medical Decision Making  57-year-old male presents the emergency department for acute low back pain status post shoveling.  Patient denies any ambulatory dysfunction saddle anesthesia urinary incontinence or other red flags.  Patient states he has a history of herniated disc in his low back.    Exam reassuring with no red flags there is palpable tenderness/spasms to the paraspinal muscles of L4-L5 bilaterally.    Differential, likely musculoskeletal injury, spasm, strain.  Less likely but within differential includes other serious pathology including cauda equina, neurovascular impingement.  However physical exam reassuring.    Will treat symptomatically at this time.  Patient provided Toradol and Robaxin with significant alleviation of symptoms.    Patient stable for discharge home with instructions for light duty rest follow-up with his PCP.      Problems Addressed:  Acute low back pain: acute illness or injury    Amount and/or Complexity of Data Reviewed  Labs:  Decision-making details documented in ED Course.  Radiology:  Decision-making details documented in ED Course.  ECG/medicine tests:  Decision-making details documented in ED Course.    Risk  Prescription drug management.             Disposition  Final diagnoses:   Acute low back pain     Time reflects when diagnosis was documented in both MDM as applicable and the Disposition  within this note       Time User Action Codes Description Comment    2/18/2024  4:12 AM David Jacinto Add [M54.50] Acute low back pain           ED Disposition       ED Disposition   Discharge    Condition   Stable    Date/Time   Sun Feb 18, 2024  5:51 AM    Comment   John Kenny discharge to home/self care.                   Follow-up Information    None         Patient's Medications   Discharge Prescriptions    No medications on file       No discharge procedures on file.    PDMP Review         Value Time User    PDMP Reviewed  Yes 10/14/2022  9:38 AM Pranav Dyer DO            ED Provider  Electronically Signed by             David Jacinto MD  02/18/24 0551

## 2024-02-20 ENCOUNTER — OFFICE VISIT (OUTPATIENT)
Dept: INTERNAL MEDICINE CLINIC | Facility: CLINIC | Age: 59
End: 2024-02-20
Payer: COMMERCIAL

## 2024-02-20 VITALS
DIASTOLIC BLOOD PRESSURE: 102 MMHG | TEMPERATURE: 98.3 F | OXYGEN SATURATION: 99 % | HEIGHT: 70 IN | BODY MASS INDEX: 30.14 KG/M2 | HEART RATE: 65 BPM | WEIGHT: 210.5 LBS | SYSTOLIC BLOOD PRESSURE: 164 MMHG

## 2024-02-20 DIAGNOSIS — C67.3 MALIGNANT NEOPLASM OF ANTERIOR WALL OF BLADDER (HCC): ICD-10-CM

## 2024-02-20 DIAGNOSIS — Z23 ENCOUNTER FOR IMMUNIZATION: ICD-10-CM

## 2024-02-20 DIAGNOSIS — Z12.11 SCREENING FOR COLON CANCER: ICD-10-CM

## 2024-02-20 DIAGNOSIS — F17.210 SMOKING GREATER THAN 20 PACK YEARS: ICD-10-CM

## 2024-02-20 DIAGNOSIS — S39.92XA INJURY OF LOW BACK, INITIAL ENCOUNTER: Primary | ICD-10-CM

## 2024-02-20 DIAGNOSIS — I10 PRIMARY HYPERTENSION: ICD-10-CM

## 2024-02-20 PROCEDURE — 99213 OFFICE O/P EST LOW 20 MIN: CPT | Performed by: INTERNAL MEDICINE

## 2024-02-20 PROCEDURE — 96372 THER/PROPH/DIAG INJ SC/IM: CPT | Performed by: INTERNAL MEDICINE

## 2024-02-20 RX ORDER — METOPROLOL SUCCINATE 50 MG/1
50 TABLET, EXTENDED RELEASE ORAL DAILY
Qty: 90 TABLET | Refills: 1 | Status: SHIPPED | OUTPATIENT
Start: 2024-02-20

## 2024-02-20 RX ORDER — METHOCARBAMOL 500 MG/1
500 TABLET, FILM COATED ORAL 4 TIMES DAILY
Qty: 90 TABLET | Refills: 0 | Status: SHIPPED | OUTPATIENT
Start: 2024-02-20

## 2024-02-20 RX ORDER — DEXAMETHASONE SODIUM PHOSPHATE 10 MG/ML
5 INJECTION INTRAMUSCULAR; INTRAVENOUS ONCE
Status: COMPLETED | OUTPATIENT
Start: 2024-02-20 | End: 2024-02-20

## 2024-02-20 RX ORDER — PREDNISONE 10 MG/1
TABLET ORAL
Qty: 50 TABLET | Refills: 0 | Status: SHIPPED | OUTPATIENT
Start: 2024-02-20

## 2024-02-20 RX ADMIN — DEXAMETHASONE SODIUM PHOSPHATE 5 MG: 10 INJECTION INTRAMUSCULAR; INTRAVENOUS at 14:43

## 2024-02-20 NOTE — PATIENT INSTRUCTIONS
Prednisone (By mouth)   Prednisone (PRED-ni-sone)  Treats many diseases and conditions, especially problems related to inflammation. This medicine is a corticosteroid.   Brand Name(s): Andrei, predniSONE Intensol   There may be other brand names for this medicine.  When This Medicine Should Not Be Used:   This medicine is not right for everyone. Do not use if you had an allergic reaction to prednisone or if you are pregnant.  How to Use This Medicine:   Liquid, Tablet, Delayed Release Tablet  Take your medicine as directed. Your dose may need to be changed several times to find what works best for you.  It is best to take this medicine with food or milk.  Swallow the delayed-release tablet whole. Do not crush, break, or chew it.  Measure the oral liquid medicine with a marked measuring spoon, oral syringe, or medicine cup.  Missed dose: Take a dose as soon as you remember. If it is almost time for your next dose, wait until then and take a regular dose. Do not take extra medicine to make up for a missed dose.  Store the medicine in a closed container at room temperature, away from heat, moisture, and direct light. Do not freeze the oral liquid.  Drugs and Foods to Avoid:   Ask your doctor or pharmacist before using any other medicine, including over-the-counter medicines, vitamins, and herbal products.  Tell your doctor if you use any of the following:  Aminoglutethimide, amphotericin B, carbamazepine, cholestyramine, cyclosporine, digoxin, isoniazid, ketoconazole, phenobarbital, phenytoin, or rifampin  Blood thinner, such as warfarin  NSAID pain or arthritis medicine, such as aspirin, diclofenac, ibuprofen, naproxen, celecoxib  Diuretic (water pill)  Diabetes medicine  Macrolide antibiotic, such as azithromycin, clarithromycin, erythromycin  Estrogen, including birth control pills or hormone replacement therapy  This medicine may interfere with vaccines. Ask your doctor before you get a flu shot or any other  vaccines.  Warnings While Using This Medicine:   It is not safe to take this medicine during pregnancy. It could harm an unborn baby. Tell your doctor right away if you become pregnant.  Tell your doctor if you are breastfeeding or if you have kidney problems, heart failure, high blood pressure, a recent heart attack, diabetes, glaucoma, osteoporosis, or thyroid problems. Tell your doctor about any infection you have. Also tell your doctor if you have had mental or emotional problems (such as depression) or stomach or bowel problems (such as an ulcer or diverticulitis).  This medicine may cause the following problems:  Mood or behavior changes  Higher blood pressure, retaining water, changes in salt or potassium levels in your body  Cataracts or glaucoma (with long-term use)  Weak bones or osteoporosis (with long-term use)  Slow growth in children (with long-term use)  Muscle problems (with high doses, especially if you have myasthenia gravis or similar nerve and muscle problems)  Do not stop using this medicine suddenly. Your doctor will need to slowly decrease your dose before you stop it completely.  This medicine could cause you to get infections more easily. Tell your doctor right away if you are exposed to chicken pox, measles, or other serious infection. Tell your doctor if you had a serious infection in the past, such as tuberculosis or herpes.  Tell your doctor about any extra stress or anxiety in your life. Your dose might need to be changed for a short time.  Tell any doctor or dentist who treats you that you are using this medicine. This medicine may affect certain medical test results.  Keep all medicine out of the reach of children. Never share your medicine with anyone.  Possible Side Effects While Using This Medicine:   Call your doctor right away if you notice any of these side effects:  Allergic reaction: Itching or hives, swelling in your face or hands, swelling or tingling in your mouth or  throat, chest tightness, trouble breathing  Dark freckles, skin color changes, coldness, weakness, tiredness, nausea, vomiting, weight loss  Depression, unusual thoughts, feelings, or behaviors, trouble sleeping  Fever, chills, cough, sore throat, and body aches  Muscle pain or weakness  Rapid weight gain, swelling in your hands, ankles, or feet  Severe stomach pain, nausea, vomiting, or red or black stools  Skin changes or growths  Trouble seeing, eye pain, headache  If you notice these less serious side effects, talk with your doctor:   Increased appetite  Round, puffy face  Weight gain around your neck, upper back, breast, face, or waist  If you notice other side effects that you think are caused by this medicine, tell your doctor.   Call your doctor for medical advice about side effects. You may report side effects to FDA at 8-026-FDA-4187  © Copyright [employer] 2023 Information is for End User's use only and may not be sold, redistributed or otherwise used for commercial purposes.  The above information is an  only. It is not intended as medical advice for individual conditions or treatments. Talk to your doctor, nurse or pharmacist before following any medical regimen to see if it is safe and effective for you.

## 2024-02-20 NOTE — PROGRESS NOTES
Assessment/Plan:  Problem List Items Addressed This Visit          Cardiovascular and Mediastinum    Hypertension    Relevant Medications    metoprolol succinate (TOPROL-XL) 50 mg 24 hr tablet       Genitourinary    Malignant neoplasm of anterior wall of bladder (HCC)     Other Visit Diagnoses       Injury of low back, initial encounter    -  Primary    Relevant Medications    predniSONE 10 mg tablet    methocarbamol (ROBAXIN) 500 mg tablet    dexamethasone (DECADRON) injection 5 mg (Start on 2/20/2024  2:30 PM)    Encounter for immunization        Relevant Orders    Pneumococcal Conjugate Vaccine 20-valent (Pcv20)    TDAP VACCINE GREATER THAN OR EQUAL TO 6YO IM    Zoster Vaccine Recombinant IM    Smoking greater than 20 pack years        Screening for colon cancer                 Diagnoses and all orders for this visit:    Injury of low back, initial encounter  -     predniSONE 10 mg tablet; 40mg po q d x 5, then 30mg po q d x 5, then 20mg po q d x 5, then 10mg po q d x 5, then d/c.  -     methocarbamol (ROBAXIN) 500 mg tablet; Take 1 tablet (500 mg total) by mouth 4 (four) times a day  -     dexamethasone (DECADRON) injection 5 mg    Encounter for immunization  -     Pneumococcal Conjugate Vaccine 20-valent (Pcv20)  -     TDAP VACCINE GREATER THAN OR EQUAL TO 6YO IM  -     Zoster Vaccine Recombinant IM    Smoking greater than 20 pack years    Screening for colon cancer    Primary hypertension  -     metoprolol succinate (TOPROL-XL) 50 mg 24 hr tablet; Take 1 tablet (50 mg total) by mouth daily    Malignant neoplasm of anterior wall of bladder (HCC)        No problem-specific Assessment & Plan notes found for this encounter.    A/P: Rest and warm compresses. Hold on imaging and PT for now. Start steroid wean and muscle relaxants. BP up due to pain. RTC one week for f/u.     Subjective:      Patient ID: John Kenny is a 58 y.o. male.    WM w/o any officially back issues in the past, presents with a several day of  progressive LBP after shoveling snow. Seen in the ER and given NSAID's. NO improvement. Rated an 8/10 with flares. Unable to straighten up. No radiation, saddle anesthesia, or change in bowel or bladder habits. .         The following portions of the patient's history were reviewed and updated as appropriate:   He has a past medical history of Bladder cancer (HCC), Cancer (HCC), DVT (deep venous thrombosis) (HCC) (09/2021), and Hypertension.,  does not have any pertinent problems on file.,   has a past surgical history that includes Transurethral resection of bladder tumor (N/A, 08/30/2021); CYSTOSCOPY (N/A, 08/30/2021); Ankle surgery (Right); Tonsillectomy; pr cystourethroscopy with biopsy (N/A, 12/06/2021); pr cysto w/removal of lesions small (N/A, 09/08/2022); Cystoscopy; and pr cysto w/removal of lesions small (N/A, 12/5/2022).,  family history includes Hypertension in his mother.,   reports that he has been smoking cigarettes. He has a 30 pack-year smoking history. He has been exposed to tobacco smoke. He has never used smokeless tobacco. He reports current alcohol use of about 7.0 standard drinks of alcohol per week.  Drug: Marijuana.,  is allergic to penicillins..  Current Outpatient Medications   Medication Sig Dispense Refill    albuterol (Ventolin HFA) 90 mcg/act inhaler Inhale 2 puffs every 6 (six) hours as needed for wheezing 18 g 5    ketorolac (TORADOL) 10 mg tablet Take 1 tablet (10 mg total) by mouth every 6 (six) hours as needed for moderate pain 20 tablet 0    methocarbamol (ROBAXIN) 500 mg tablet Take 1 tablet (500 mg total) by mouth 4 (four) times a day 90 tablet 0    metoprolol succinate (TOPROL-XL) 50 mg 24 hr tablet Take 1 tablet (50 mg total) by mouth daily 90 tablet 1    predniSONE 10 mg tablet 40mg po q d x 5, then 30mg po q d x 5, then 20mg po q d x 5, then 10mg po q d x 5, then d/c. 50 tablet 0     Current Facility-Administered Medications   Medication Dose Route Frequency Provider Last  "Rate Last Admin    dexamethasone (DECADRON) injection 5 mg  5 mg Intramuscular Once            Review of Systems   Constitutional:  Positive for activity change. Negative for chills, diaphoresis, fatigue and fever.   Respiratory:  Negative for cough, chest tightness, shortness of breath and wheezing.    Cardiovascular:  Negative for chest pain, palpitations and leg swelling.   Gastrointestinal:  Negative for abdominal pain, constipation, diarrhea, nausea and vomiting.   Genitourinary:  Negative for difficulty urinating, dysuria and frequency.   Musculoskeletal:  Positive for back pain. Negative for arthralgias, gait problem, joint swelling and myalgias.   Neurological:  Negative for weakness, light-headedness, numbness and headaches.   Psychiatric/Behavioral:  Negative for confusion. The patient is not nervous/anxious.        PHQ-2/9 Depression Screening    Little interest or pleasure in doing things: 0 - not at all  Feeling down, depressed, or hopeless: 0 - not at all  PHQ-2 Score: 0  PHQ-2 Interpretation: Negative depression screen        Objective:  Vitals:    02/20/24 1407   BP: (!) 164/102   Pulse: 65   Temp: 98.3 °F (36.8 °C)   SpO2: 99%   Weight: 95.5 kg (210 lb 8 oz)   Height: 5' 10\" (1.778 m)     Body mass index is 30.2 kg/m².     Physical Exam  Constitutional:       General: He is not in acute distress.     Appearance: Normal appearance. He is not ill-appearing.   HENT:      Head: Normocephalic and atraumatic.      Mouth/Throat:      Mouth: Mucous membranes are moist.   Eyes:      Extraocular Movements: Extraocular movements intact.      Conjunctiva/sclera: Conjunctivae normal.      Pupils: Pupils are equal, round, and reactive to light.   Musculoskeletal:         General: Tenderness and signs of injury present. No swelling or deformity.      Right lower leg: No edema.      Comments: LS Spine w/o gross deformities, increase temp, erythema, swelling, or lesions. Tenderness Midline L2-S1 and to the right. " ROM decreased all planes 50%. Spasms noted. LE strength 5/5 with tone/ROM WNL. DTR 2/4.  No gait abnormalities(toe/heel walking).     Neurological:      General: No focal deficit present.      Mental Status: He is alert and oriented to person, place, and time. Mental status is at baseline.   Psychiatric:         Mood and Affect: Mood normal.         Behavior: Behavior normal.         Thought Content: Thought content normal.         Judgment: Judgment normal.

## 2024-06-06 ENCOUNTER — VBI (OUTPATIENT)
Dept: ADMINISTRATIVE | Facility: OTHER | Age: 59
End: 2024-06-06

## 2024-07-10 ENCOUNTER — TELEPHONE (OUTPATIENT)
Dept: UROLOGY | Facility: CLINIC | Age: 59
End: 2024-07-10

## 2024-07-10 NOTE — TELEPHONE ENCOUNTER
Contacted patient and re-scheduled cystoscopy for tomorrow to 7/30 @ 9:15 am in the [place] office. Office unable to perform cystoscopies tomorrow due to hot water heater break in the office.

## 2024-07-17 ENCOUNTER — RA CDI HCC (OUTPATIENT)
Dept: OTHER | Facility: HOSPITAL | Age: 59
End: 2024-07-17

## 2024-07-30 ENCOUNTER — PROCEDURE VISIT (OUTPATIENT)
Dept: UROLOGY | Facility: CLINIC | Age: 59
End: 2024-07-30
Payer: COMMERCIAL

## 2024-07-30 VITALS — HEIGHT: 70 IN | BODY MASS INDEX: 26.63 KG/M2 | WEIGHT: 186 LBS

## 2024-07-30 DIAGNOSIS — Z12.5 PROSTATE CANCER SCREENING: ICD-10-CM

## 2024-07-30 DIAGNOSIS — C67.9 MALIGNANT NEOPLASM OF URINARY BLADDER, UNSPECIFIED SITE (HCC): Primary | ICD-10-CM

## 2024-07-30 LAB
SL AMB  POCT GLUCOSE, UA: NEGATIVE
SL AMB LEUKOCYTE ESTERASE,UA: NEGATIVE
SL AMB POCT BILIRUBIN,UA: NEGATIVE
SL AMB POCT BLOOD,UA: NORMAL
SL AMB POCT CLARITY,UA: CLEAR
SL AMB POCT COLOR,UA: YELLOW
SL AMB POCT KETONES,UA: NEGATIVE
SL AMB POCT NITRITE,UA: NEGATIVE
SL AMB POCT PH,UA: 6
SL AMB POCT SPECIFIC GRAVITY,UA: 1.01
SL AMB POCT URINE PROTEIN: NEGATIVE
SL AMB POCT UROBILINOGEN: 0.2

## 2024-07-30 PROCEDURE — 52000 CYSTOURETHROSCOPY: CPT | Performed by: UROLOGY

## 2024-07-30 PROCEDURE — 81002 URINALYSIS NONAUTO W/O SCOPE: CPT | Performed by: UROLOGY

## 2024-07-30 PROCEDURE — 99213 OFFICE O/P EST LOW 20 MIN: CPT | Performed by: UROLOGY

## 2024-07-30 RX ORDER — CIPROFLOXACIN 500 MG/1
500 TABLET, FILM COATED ORAL ONCE
Qty: 1 TABLET | Refills: 0 | Status: SHIPPED | OUTPATIENT
Start: 2024-07-30 | End: 2024-07-30

## 2024-07-30 NOTE — LETTER
2024     Pranav Dyer DO  [address]  [address]  [facility] [address]    Patient: John Kenny   YOB: 1965   Date of Visit: 2024       Dear Dr. Dyer:    Thank you for referring John Kenny to me for evaluation. Below are my notes for this consultation.    If you have questions, please do not hesitate to call me. I look forward to following your patient along with you.         Sincerely,        Kirill Rivera MD        CC: No Recipients    Kirill Rivera MD  2024  9:53 AM  Sign when Signing Visit  UROLOGY PROGRESS NOTE   [facility]  [address] [place] [address]  [address]  [place] [address]  [phone]  Fax:[phone]  [email or web address]      NAME: John Kenny  AGE: 58 y.o. SEX: male  : 1965   MRN: [id number]    DATE: 2024  TIME: 9:45 AM    Assessment and Plan:    Bladder cancer: Last recurrence was visual only 2023.  Nothing for a year now.  His request and I think this is reasonable we will do our next cystoscopy in 1 year.    Prostate cancer screening: Encouraged him to get the PSA which I have ordered.  He will do this when he is ready.               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  Muscle was present and was uninvolved.  He had mitomycin instilled.  He then underwent TURBT for a right lateral wall lesion 2022 by me which showed urothelial dysplasia with involvement of cystitis cystica and no  "cancer.  He has never had BCG.  After the mitomycin in September with a TURBT he had severe bladder pain with bladder spasms with referral to the urethra.  He referred to it as \"a nightmare\".  He cannot receive mitomycin anymore due to the inflammatory effects.  This is also the reason why no BCG.  Here for surveillance cystoscopy.  CT scan abdomen and pelvis November 8, 2022 showed an irregular enhancing partially circumferential bladder wall thickening with nodular margins anterior wall and dome but continuing along the anterior right bladder wall.  Kidneys were okay.  No sign metastases.  Of course that area on the bladder was followed up with the TURBT of benign lesions in December.  Here for surveillance cystoscopy -still has urinary frequency but no longer has urgency where he feels he is going to urinate on himself so maybe it is resolving a bit--cystoscopy  by me September 5, 2023 showed possible tiny recurrences that were fulgurated completely that looked premalignant right lateral wall and right lateral trigone.  No biopsies were taken.  Last cystoscopy was January 11, 2024 which showed no recurrence.  Prostate was only minimally occlusive.  Prostate cancer screening: Last PSA 2.6 November 4, 2021, nothing more recent despite others being ordered.       Cystoscopy     Date/Time  7/30/2024 9:15 AM     Performed by  Kirill Rivera MD   Authorized by  Kirill Rivera MD     Universal Protocol:  Consent: Verbal consent obtained. Written consent obtained.      Procedure Details:  Procedure type: cystoscopy    Additional Procedure Details: Cystoscopy Procedure Note        Pre-operative Diagnosis: Bladder cancer surveillance    Post-operative Diagnosis: Same, no recurrence    Procedure: Flexible cystoscopy    Surgeon: Kirill Rivera MD    Anesthesia: 1% Xylocaine per urethra    EBL: Minimal    Complications: none    Procedure Details   The risks, benefits, complications, treatment options, and expected outcomes were " discussed with the patient. The patient concurred with the proposed plan, giving informed consent.    Cystoscopy was performed today under local anesthesia, using sterile technique. The patient was placed in the supine position, prepped with Betadine, and draped in the usual sterile fashion. The flexible cystocope was used to inspect both the urethra and bladder    Findings:  Urethra: Normal without stricture.  Prostate does show some intravesical intrusion with a small median lobe and moderate occlusion.    Bladder:  Smooth, not trabeculated and there were no stones tumors or other lesions.  The orifices were orthotopic and intact.  No recurrence.           Specimens: None                 Complications:  None           Disposition: To home            Condition:  Stable          The following portions of the patient's history were reviewed and updated as appropriate: allergies, current medications, past family history, past medical history, past social history, past surgical history and problem list.  Past Medical History:   Diagnosis Date   • Bladder cancer (HCC)    • Cancer (HCC)    • DVT (deep venous thrombosis) (HCC) 09/2021    Right   • Hypertension      Past Surgical History:   Procedure Laterality Date   • ANKLE SURGERY Right    • CYSTOSCOPY N/A 08/30/2021    Procedure: CYSTOSCOPY;  Surgeon: Kirill Rivera MD;  Location:  MAIN OR;  Service: Urology   • CYSTOSCOPY     • MA CYSTO W/REMOVAL OF LESIONS SMALL N/A 09/08/2022    Procedure: TRANSURETHRAL RESECTION OF BLADDER TUMOR (TURBT) and mitomycin installation;  Surgeon: Kirill Rivera MD;  Location: [facility];  Service: Urology   • MA CYSTO W/REMOVAL OF LESIONS SMALL N/A 12/5/2022    Procedure: cysto bladder lesion biopsy fulguration;  Surgeon: Kirill Rivera MD;  [facility]: [facility];  Service: Urology   • MA CYSTOURETHROSCOPY WITH BIOPSY N/A 12/06/2021    Procedure: CYSTOSCOPY, TURBT right lateral wall;  Surgeon: Kirill Rivera MD;  Location: [facility];  Service:  "Urology   • TONSILLECTOMY     • TRANSURETHRAL RESECTION OF BLADDER TUMOR N/A 08/30/2021    Procedure: TRANSURETHRAL RESECTION OF BLADDER TUMOR (TURBT);  Surgeon: Kirill Rivera MD;  Location: [facility];  Service: Urology     shoulder  Review of Systems   Review of Systems   Genitourinary:  Positive for frequency.       Active Problem List     Patient Active Problem List   Diagnosis   • Smoking   • Bladder mass   • History of DVT (deep vein thrombosis)   • Hypertension   • Malignant neoplasm of anterior wall of bladder (HCC)   • Mild tetrahydrocannabinol (THC) abuse       Objective   Ht 5' 10\" (1.778 m)   Wt 84.4 kg (186 lb)   BMI 26.69 kg/m²     Physical Exam  Vitals reviewed.   Constitutional:       Appearance: Normal appearance. He is normal weight.   HENT:      Head: Normocephalic and atraumatic.   Eyes:      Extraocular Movements: Extraocular movements intact.   Pulmonary:      Effort: Pulmonary effort is normal.   Genitourinary:     Penis: Normal.    Musculoskeletal:         General: Normal range of motion.      Cervical back: Normal range of motion.   Skin:     Coloration: Skin is not jaundiced or pale.   Neurological:      General: No focal deficit present.      Mental Status: He is alert and oriented to person, place, and time. Mental status is at baseline.   Psychiatric:         Mood and Affect: Mood normal.         Behavior: Behavior normal.         Thought Content: Thought content normal.         Judgment: Judgment normal.             Current Medications     Current Outpatient Medications:   •  albuterol (Ventolin HFA) 90 mcg/act inhaler, Inhale 2 puffs every 6 (six) hours as needed for wheezing (Patient not taking: Reported on 7/30/2024), Disp: 18 g, Rfl: 5  •  ketorolac (TORADOL) 10 mg tablet, Take 1 tablet (10 mg total) by mouth every 6 (six) hours as needed for moderate pain (Patient not taking: Reported on 7/30/2024), Disp: 20 tablet, Rfl: 0  •  methocarbamol (ROBAXIN) 500 mg tablet, Take 1 tablet " (500 mg total) by mouth 4 (four) times a day (Patient not taking: Reported on 7/30/2024), Disp: 90 tablet, Rfl: 0  •  metoprolol succinate (TOPROL-XL) 50 mg 24 hr tablet, Take 1 tablet (50 mg total) by mouth daily (Patient not taking: Reported on 7/30/2024), Disp: 90 tablet, Rfl: 1  •  predniSONE 10 mg tablet, 40mg po q d x 5, then 30mg po q d x 5, then 20mg po q d x 5, then 10mg po q d x 5, then d/c. (Patient not taking: Reported on 7/30/2024), Disp: 50 tablet, Rfl: 0        Kirill Rivera MD

## 2024-07-30 NOTE — PROGRESS NOTES
"UROLOGY PROGRESS NOTE   [facility]  [address]  [address]  [place] [address]  [phone]  Fax:[phone]  [email or web address]      NAME: John Kenny  AGE: 58 y.o. SEX: male  : 1965   MRN: [id number]    DATE: 2024  TIME: 9:45 AM    Assessment and Plan:    Bladder cancer: Last recurrence was visual only 2023.  Nothing for a year now.  His request and I think this is reasonable we will do our next cystoscopy in 1 year.    Prostate cancer screening: Encouraged him to get the PSA which I have ordered.  He will do this when he is ready.               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  Muscle was present and was uninvolved.  He had mitomycin instilled.  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  He has never had BCG.  After the mitomycin in September with a TURBT he had severe bladder pain with bladder spasms with referral to the urethra.  He referred to it as \"a nightmare\".  He cannot receive mitomycin anymore due to the inflammatory effects.  This is also the reason why no BCG.  Here for surveillance cystoscopy.  CT scan abdomen and pelvis 2022 showed an irregular enhancing partially circumferential bladder wall thickening with nodular margins anterior wall and dome but continuing along the anterior right bladder " wall.  Kidneys were okay.  No sign metastases.  Of course that area on the bladder was followed up with the TURBT of benign lesions in December.  Here for surveillance cystoscopy -still has urinary frequency but no longer has urgency where he feels he is going to urinate on himself so maybe it is resolving a bit--cystoscopy  by me September 5, 2023 showed possible tiny recurrences that were fulgurated completely that looked premalignant right lateral wall and right lateral trigone.  No biopsies were taken.  Last cystoscopy was January 11, 2024 which showed no recurrence.  Prostate was only minimally occlusive.  Prostate cancer screening: Last PSA 2.6 November 4, 2021, nothing more recent despite others being ordered.       Cystoscopy     Date/Time  7/30/2024 9:15 AM     Performed by  Kirill Rivera MD   Authorized by  Kirill Rivera MD     Universal Protocol:  Consent: Verbal consent obtained. Written consent obtained.      Procedure Details:  Procedure type: cystoscopy    Additional Procedure Details: Cystoscopy Procedure Note        Pre-operative Diagnosis: Bladder cancer surveillance    Post-operative Diagnosis: Same, no recurrence    Procedure: Flexible cystoscopy    Surgeon: Kirill Rivera MD    Anesthesia: 1% Xylocaine per urethra    EBL: Minimal    Complications: none    Procedure Details   The risks, benefits, complications, treatment options, and expected outcomes were discussed with the patient. The patient concurred with the proposed plan, giving informed consent.    Cystoscopy was performed today under local anesthesia, using sterile technique. The patient was placed in the supine position, prepped with Betadine, and draped in the usual sterile fashion. The flexible cystocope was used to inspect both the urethra and bladder    Findings:  Urethra: Normal without stricture.  Prostate does show some intravesical intrusion with a small median lobe and moderate occlusion.    Bladder:  Smooth, not trabeculated and  there were no stones tumors or other lesions.  The orifices were orthotopic and intact.  No recurrence.           Specimens: None                 Complications:  None           Disposition: To home            Condition:  Stable          The following portions of the patient's history were reviewed and updated as appropriate: allergies, current medications, past family history, past medical history, past social history, past surgical history and problem list.  Past Medical History:   Diagnosis Date    Bladder cancer (HCC)     Cancer (HCC)     DVT (deep venous thrombosis) (HCC) 09/2021    Right    Hypertension      Past Surgical History:   Procedure Laterality Date    ANKLE SURGERY Right     CYSTOSCOPY N/A 08/30/2021    Procedure: CYSTOSCOPY;  Surgeon: Kirill Rivera MD;  Location:  MAIN OR;  Service: Urology    CYSTOSCOPY      OR CYSTO W/REMOVAL OF LESIONS SMALL N/A 09/08/2022    Procedure: TRANSURETHRAL RESECTION OF BLADDER TUMOR (TURBT) and mitomycin installation;  Surgeon: Kirill Rivera MD;  [facility];  Service: Urology    OR CYSTO W/REMOVAL OF LESIONS SMALL N/A 12/5/2022    Procedure: cysto bladder lesion biopsy fulguration;  Surgeon: Kirill Rivera MD;  [facility]: [facility];  Service: Urology    OR CYSTOURETHROSCOPY WITH BIOPSY N/A 12/06/2021    Procedure: CYSTOSCOPY, TURBT right lateral wall;  Surgeon: Kirill Rivera MD;  [facility]: [facility];  Service: Urology    TONSILLECTOMY      TRANSURETHRAL RESECTION OF BLADDER TUMOR N/A 08/30/2021    Procedure: TRANSURETHRAL RESECTION OF BLADDER TUMOR (TURBT);  Surgeon: Kirill Rivera MD;  Location:  MAIN OR;  Service: Urology     shoulder  Review of Systems   Review of Systems   Genitourinary:  Positive for frequency.       Active Problem List     Patient Active Problem List   Diagnosis    Smoking    Bladder mass    History of DVT (deep vein thrombosis)    Hypertension    Malignant neoplasm of anterior wall of bladder (HCC)    Mild tetrahydrocannabinol (THC) abuse  "      Objective   Ht 5' 10\" (1.778 m)   Wt 84.4 kg (186 lb)   BMI 26.69 kg/m²     Physical Exam  Vitals reviewed.   Constitutional:       Appearance: Normal appearance. He is normal weight.   HENT:      Head: Normocephalic and atraumatic.   Eyes:      Extraocular Movements: Extraocular movements intact.   Pulmonary:      Effort: Pulmonary effort is normal.   Genitourinary:     Penis: Normal.    Musculoskeletal:         General: Normal range of motion.      Cervical back: Normal range of motion.   Skin:     Coloration: Skin is not jaundiced or pale.   Neurological:      General: No focal deficit present.      Mental Status: He is alert and oriented to person, place, and time. Mental status is at baseline.   Psychiatric:         Mood and Affect: Mood normal.         Behavior: Behavior normal.         Thought Content: Thought content normal.         Judgment: Judgment normal.             Current Medications     Current Outpatient Medications:     albuterol (Ventolin HFA) 90 mcg/act inhaler, Inhale 2 puffs every 6 (six) hours as needed for wheezing (Patient not taking: Reported on 7/30/2024), Disp: 18 g, Rfl: 5    ketorolac (TORADOL) 10 mg tablet, Take 1 tablet (10 mg total) by mouth every 6 (six) hours as needed for moderate pain (Patient not taking: Reported on 7/30/2024), Disp: 20 tablet, Rfl: 0    methocarbamol (ROBAXIN) 500 mg tablet, Take 1 tablet (500 mg total) by mouth 4 (four) times a day (Patient not taking: Reported on 7/30/2024), Disp: 90 tablet, Rfl: 0    metoprolol succinate (TOPROL-XL) 50 mg 24 hr tablet, Take 1 tablet (50 mg total) by mouth daily (Patient not taking: Reported on 7/30/2024), Disp: 90 tablet, Rfl: 1    predniSONE 10 mg tablet, 40mg po q d x 5, then 30mg po q d x 5, then 20mg po q d x 5, then 10mg po q d x 5, then d/c. (Patient not taking: Reported on 7/30/2024), Disp: 50 tablet, Rfl: 0        Kirill Rivera MD         "

## 2024-10-21 ENCOUNTER — CLINICAL SUPPORT (OUTPATIENT)
Dept: INTERNAL MEDICINE CLINIC | Facility: CLINIC | Age: 59
End: 2024-10-21
Payer: COMMERCIAL

## 2024-10-21 ENCOUNTER — TELEPHONE (OUTPATIENT)
Age: 59
End: 2024-10-21

## 2024-10-21 DIAGNOSIS — Z23 ENCOUNTER FOR IMMUNIZATION: Primary | ICD-10-CM

## 2024-10-21 PROCEDURE — 90715 TDAP VACCINE 7 YRS/> IM: CPT

## 2024-10-21 PROCEDURE — 90471 IMMUNIZATION ADMIN: CPT

## 2024-10-21 NOTE — TELEPHONE ENCOUNTER
Pt states he was out hunting today and stepped on a nail. Called to find out when his last tetanus shot was. No record in patients chart.  Pt states he is on his way to the office and will stop in for a tetanus.  CTS reached out to office clerical who said it was alright for pt to stop in and they will place him on the nurse's schedule.    Pt aware

## 2025-06-12 ENCOUNTER — TELEPHONE (OUTPATIENT)
Age: 60
End: 2025-06-12

## 2025-07-15 NOTE — PLAN OF CARE
Please see the attached refill request.   Problem: PAIN - ADULT  Goal: Verbalizes/displays adequate comfort level or baseline comfort level  Description: Interventions:  - Encourage patient to monitor pain and request assistance  - Assess pain using appropriate pain scale  - Administer analgesics based on type and severity of pain and evaluate response  - Implement non-pharmacological measures as appropriate and evaluate response  - Consider cultural and social influences on pain and pain management  - Notify physician/advanced practitioner if interventions unsuccessful or patient reports new pain  Outcome: Progressing

## 2025-07-25 ENCOUNTER — TELEPHONE (OUTPATIENT)
Dept: INTERNAL MEDICINE CLINIC | Facility: CLINIC | Age: 60
End: 2025-07-25

## 2025-08-19 ENCOUNTER — PROCEDURE VISIT (OUTPATIENT)
Age: 60
End: 2025-08-19
Payer: COMMERCIAL

## 2025-08-19 ENCOUNTER — TELEPHONE (OUTPATIENT)
Age: 60
End: 2025-08-19

## 2025-08-19 VITALS — OXYGEN SATURATION: 98 % | HEART RATE: 96 BPM | SYSTOLIC BLOOD PRESSURE: 150 MMHG | DIASTOLIC BLOOD PRESSURE: 90 MMHG

## 2025-08-19 DIAGNOSIS — Z12.5 PROSTATE CANCER SCREENING: ICD-10-CM

## 2025-08-19 DIAGNOSIS — C67.9 MALIGNANT NEOPLASM OF URINARY BLADDER, UNSPECIFIED SITE (HCC): Primary | ICD-10-CM

## 2025-08-19 DIAGNOSIS — C67.9 MALIGNANT NEOPLASM OF URINARY BLADDER, UNSPECIFIED SITE (HCC): ICD-10-CM

## 2025-08-19 PROCEDURE — 99213 OFFICE O/P EST LOW 20 MIN: CPT | Performed by: UROLOGY

## 2025-08-19 PROCEDURE — 52000 CYSTOURETHROSCOPY: CPT | Performed by: UROLOGY

## 2025-08-19 RX ORDER — CIPROFLOXACIN 500 MG/1
500 TABLET, FILM COATED ORAL ONCE
Qty: 1 TABLET | Refills: 0 | Status: SHIPPED | OUTPATIENT
Start: 2025-08-19 | End: 2025-08-19

## (undated) DEVICE — URO CATCHER BAG STERILE 0-UC32

## (undated) DEVICE — CYSTO TUBING TUR Y IRRIGATION

## (undated) DEVICE — CATH FOLEY HEMATURIA 24FR 30ML 3 WAY LUBRICATH

## (undated) DEVICE — LUBRICANT SURGILUBE TUBE 4 OZ  FLIP TOP

## (undated) DEVICE — TELFA NON-ADHERENT ABSORBENT DRESSING: Brand: TELFA

## (undated) DEVICE — PACK TUR

## (undated) DEVICE — BAG URINE DRAINAGE 2000ML ANTI RFLX LF

## (undated) DEVICE — CATH FOLEY 16FR 5ML 2WAY LUBRICATH

## (undated) DEVICE — SCD SEQUENTIAL COMPRESSION COMFORT SLEEVE MEDIUM KNEE LENGTH: Brand: KENDALL SCD

## (undated) DEVICE — EVACUATOR BLADDER ELLIK DISP STRL

## (undated) DEVICE — BASIC SINGLE BASIN-LF: Brand: MEDLINE INDUSTRIES, INC.

## (undated) DEVICE — CHLORHEXIDINE 4PCT 4 OZ

## (undated) DEVICE — TRANSPOSAL ULTRAFLEX DUO/QUAD ULTRA CART MANIFOLD

## (undated) DEVICE — GLOVE SRG BIOGEL 7

## (undated) DEVICE — Device: Brand: LEVEL 1

## (undated) DEVICE — BASIC SINGLE BASIN 2-LF: Brand: MEDLINE INDUSTRIES, INC.

## (undated) DEVICE — SPECIMEN CONTAINER STERILE PEEL PACK

## (undated) DEVICE — STORZ LOOP ELECTRODE 24 FR

## (undated) DEVICE — SPONGE LAP 18 X 18 IN STRL RFD

## (undated) DEVICE — MAT FLOOR STEP DRI 24 X 36 IN N-STRL

## (undated) DEVICE — TUBING SUCTION 5MM X 12 FT

## (undated) DEVICE — Device: Brand: OLYMPUS

## (undated) DEVICE — BAG DECANTER